# Patient Record
Sex: MALE | Race: WHITE | NOT HISPANIC OR LATINO | Employment: OTHER | URBAN - METROPOLITAN AREA
[De-identification: names, ages, dates, MRNs, and addresses within clinical notes are randomized per-mention and may not be internally consistent; named-entity substitution may affect disease eponyms.]

---

## 2017-01-03 ENCOUNTER — INITIAL CONSULT (OUTPATIENT)
Dept: NEUROSURGERY | Facility: CLINIC | Age: 42
End: 2017-01-03
Payer: COMMERCIAL

## 2017-01-03 VITALS
DIASTOLIC BLOOD PRESSURE: 82 MMHG | SYSTOLIC BLOOD PRESSURE: 149 MMHG | BODY MASS INDEX: 42.66 KG/M2 | WEIGHT: 315 LBS | HEIGHT: 72 IN | HEART RATE: 72 BPM

## 2017-01-03 DIAGNOSIS — M51.26 LUMBAR DISC HERNIATION: ICD-10-CM

## 2017-01-03 DIAGNOSIS — M54.50 LUMBAR SPINE PAIN: Primary | ICD-10-CM

## 2017-01-03 PROCEDURE — 99244 OFF/OP CNSLTJ NEW/EST MOD 40: CPT | Mod: S$GLB,,, | Performed by: PHYSICIAN ASSISTANT

## 2017-01-03 PROCEDURE — 99999 PR PBB SHADOW E&M-EST. PATIENT-LVL III: CPT | Mod: PBBFAC,,, | Performed by: PHYSICIAN ASSISTANT

## 2017-01-03 RX ORDER — METHYLPREDNISOLONE 4 MG/1
TABLET ORAL
Qty: 1 PACKAGE | Refills: 0 | Status: SHIPPED | OUTPATIENT
Start: 2017-01-03 | End: 2018-08-20

## 2017-01-03 NOTE — PROGRESS NOTES
[place] - Neurosurgery  Clinic Consult     Consult Requested By: Dave Hansen MD    Reason for Consult: herniated disc       SUBJECTIVE:     Chief Complaint: right lower extremity pain     History of Present Illness:  Sarkis Saez Jr. is a 41 y.o. male who presents for evaluation of L4-5 herniated disc. Patient states he experienced acute onset of low back pain after moving furniture two weeks ago. He states the pain was located in his low back and radiated into his right buttock and down the lateral aspect of his right leg. He was evaluated by his PCP and given a medrol dosepack. He states his pain significantly improved after the steroid pack. Currently, the patient is not experiencing any pain. He states he occasionally experiences right buttock and right lateral calf pain. This pain is sometimes elicited by walking, but mostly occurs at night. This pain is associated with tingling. Denies low back pain. Denies lower extremity weakness. Denies symptoms of bowel/bladder dysfunction. Patient was also prescribed Norco 5mg by his PCP, but has not needed to take any. He has been taking Aleve which provides relief. Patient is interested in conservative management at this time.     Review of patient's allergies indicates:  No Known Allergies    Past Medical History   Diagnosis Date    Anxiety     Hypertension     Obesity      No past surgical history on file.  Family History   Problem Relation Age of Onset    Diabetes Father     Hypertension Father     Heart disease Maternal Grandfather     Heart disease Paternal Grandfather      Social History   Substance Use Topics    Smoking status: Light Tobacco Smoker     Types: Cigars    Smokeless tobacco: Never Used    Alcohol use Yes        Review of Systems:  Constitutional: no fever, chills or night sweats. No changes in weight   Eyes: no visual changes   ENT: no nasal congestion or sore throat   Respiratory: no cough or shortness of breath   Cardiovascular:  no chest pain or palpitations   Gastrointestinal: no nausea or vomiting   Genitourinary: no hematuria or dysuria   Integument/Breast: no rash or pruritis   Hematologic/Lymphatic: no easy bruising or lymphadenopathy   Musculoskeletal: Negative for low back pain. Positive for right buttock and calf pain   Neurological: no seizures or tremors   Behavioral/Psych: Positive for anxiety and panic attacks   Endocrine: no heat or cold intolerance   All other systems reviewed and are negative.      OBJECTIVE:     Vital Signs (Most Recent):  Pulse: 72 (01/03/17 1341)  BP: (!) 149/82 (01/03/17 1341)    Physical Exam:  General: well developed, well nourished, no distress.   Neurologic: Alert and oriented. Thought content appropriate.  GCS: Motor: 6/Verbal: 5/Eyes: 4 GCS Total: 15  Mental Status: Awake, Alert, Oriented x 4  Language: No aphasia  Speech: No dysarthria  Cranial nerves: face symmetric, tongue midline, CN II-XII grossly intact.   Head: normocephalic, atraumatic  Eyes: pupils equal, round, reactive to light with accomodation, EOMI.  Neck: trachea midline, no JVD   Cardiovascular: no LE edema. Pulses 2+ and symmetric radial and dorsalis pedis.  Pulmonary: normal respirations, no signs of respiratory distress  Abdomen: soft, non-distended  Sensory: intact to light touch throughout  Skin: Skin is warm, dry and intact.    Motor Strength: Moves all extremities spontaneously with good tone. No abnormal movements seen.     Strength  Deltoids Triceps Biceps Wrist Extension Wrist Flexion Hand  Interossei   Upper: R 5/5 5/5 5/5 5/5 5/5 5/5 5/5    L 5/5 5/5 5/5 5/5 5/5 5/5 5/5     Iliopsoas Quadriceps Knee  Flexion Tibialis  anterior Gastro- cnemius EHL    Lower: R 5/5 5/5 5/5 5/5 5/5 5/5     L 5/5 5/5 5/5 5/5 5/5 5/5       Left  Right    Biceps DTR 2+ 2+   Brachioradialis DTR 2+ 2+   Patellar DTR 3+ 3+   Achilles DTR 3+ 3+   Kirby Absent  Absent   Clonus Absent Absent   Babinski Absent Absent     Gait: normal    Tandem  Gait: No difficulty           Able to walk on heels & toes    Lumbar Spine   ROM: full     Pain on Hip ROM: Negative  Straight leg raise elicits buttock pain on the right       Diagnostic Results:  I have independently reviewed the following imaging:  Xray Lumbar Spine AP and Lateral: mild degeneration lower lumbar region worst at L4-5     MRI Lumbar Spine: degeneration of the lumbar spine. L4-5 disc extrusion resulting in severe canal stenosis     ASSESSMENT/PLAN:     42 yo male with L4-5 extruded disc without neuro deficits or significant radicular pain.     -Physical therapy   -Medrol dosepack prescribed if pain returns  -Consider right transforaminal ZENIA if pain returns  -Follow up in 6 weeks  -Patient encouraged to call the clinic if pain returns or if he has any questions or concerns prior to his next appointment.     Discussed with Dr. Maximilian Merchant PA-C  Neurosurgery

## 2017-01-03 NOTE — LETTER
January 3, 2017      Dave Hansen MD  [address]  [facility] [address]           [place] - Neurosurgery  [address], [address]  [facility] [address]  Phone: [phone]          Patient: Sarkis Saez Jr.   MR Number: [id number]   YOB: 1975   Date of Visit: 1/3/2017       Dear Dr. Dave Hansen:    Thank you for referring Sarkis Saez to me for evaluation. Attached you will find relevant portions of my assessment and plan of care.    If you have questions, please do not hesitate to call me. I look forward to following Sarkis Saez along with you.    Sincerely,    Suze Merchant PA-C    Enclosure  CC:  No Recipients    If you would like to receive this communication electronically, please contact [email or web address] or [phone] to request more information on [employer] access.    For providers and/or their staff who would like to refer a patient to [facility], please contact us through our one-stop-shop provider referral line, [facility], at [phone].    If you feel you have received this communication in error or would no longer like to receive these types of communications, please e-mail [email or web address]

## 2017-01-06 ENCOUNTER — CLINICAL SUPPORT (OUTPATIENT)
Dept: REHABILITATION | Facility: HOSPITAL | Age: 42
End: 2017-01-06
Attending: NEUROLOGICAL SURGERY
Payer: COMMERCIAL

## 2017-01-06 DIAGNOSIS — M51.26 HERNIATION OF INTERVERTEBRAL DISC OF LUMBAR SPINE: Primary | ICD-10-CM

## 2017-01-06 PROCEDURE — G8979 MOBILITY GOAL STATUS: HCPCS | Mod: CJ,PO

## 2017-01-06 PROCEDURE — 97162 PT EVAL MOD COMPLEX 30 MIN: CPT | Mod: PO

## 2017-01-06 PROCEDURE — G8978 MOBILITY CURRENT STATUS: HCPCS | Mod: CJ,PO

## 2017-01-06 PROCEDURE — 97112 NEUROMUSCULAR REEDUCATION: CPT | Mod: PO

## 2017-01-06 NOTE — PROGRESS NOTES
Name: Sarkis Saez Jr.  Clinic Number: [id number]  01/06/2017.     Diagnosis: lumbar disc herniation   Physician: Brendon Yao MD  Treatment Orders: PT Eval and Treat    Past Medical History   Diagnosis Date    Anxiety     Hypertension     Obesity      Current Outpatient Prescriptions   Medication Sig    aspirin (ECOTRIN) 81 MG EC tablet Take 81 mg by mouth once daily.    atenolol (TENORMIN) 50 MG tablet Take 1 tablet (50 mg total) by mouth once daily.    buPROPion (WELLBUTRIN XL) 150 MG TB24 tablet Take 150 mg by mouth once daily.     clonazepam (KLONOPIN) 0.5 MG tablet Take 0.5 mg by mouth once daily.     fluoxetine (PROZAC) 40 MG capsule Take 80 mg by mouth once daily.     methylPREDNISolone (MEDROL DOSEPACK) 4 mg tablet use as directed    naproxen sodium (ANAPROX) 220 MG tablet Take 220 mg by mouth every 12 (twelve) hours.     No current facility-administered medications for this visit.      Review of patient's allergies indicates:  No Known Allergies  Precautions: standard      Subjective:    Previous PT: no  Work history: ; spends most of time sitting down   Recreational activities/exercise program: playing with kids     Sarkis is a 41 y.o. male referred to PT with lumbar disc herniation. sxs started week before Mila after lifting furniture. sxs are on the R side. Had similar pain before in May/June after sitting down. That episode resolved itself in a few weeks. Had to sit down, and had trouble getting back up again. Had x-ray and MRI, started steroid pack, and has been using Alleve for pain. Currently has no lower back pian unless he exerts his hips. sxs will be for a little while then go away. sxs are on his posterior buttock and exerior calf. sxs feel like someone has been scratching skin and it is sensitive to the touch.  Biggest pain is when leg is extended and when he gets up. sxs are worse at night after he starts doing activities. When he lays in bed with his  leg extended sxs will increase. Will typically sleep on his R side.  Initial MD concerned about unable to do heel walking, but PA at neurosurgery was no concerned because he has full strength.     Pain is rated on a 0-10 scale with 0 being no pain and 10 being pain requiring emergency room visit.    Diagnostic Tests: Radiographs and MRI    Patient Goals for PT: reduce pain      Objective:    FOTO Back Survey 39% limitations  Current -LQ Goal -UV (26%)    Category: Mobility     G-Code Modifiers  CH  0% Impaired, limited, or restricted    CI  19% - 1%  Impaired, limited, or restricted    CJ  20% - 39% Impaired, limited, or restricted    CK  40% - 59% Impaired, limited, or restricted    CL  60% - 79% Impaired, limited, or restricted    CM  80% - 99% Impaired, limited, or restricted    CN  100% Impaired, limited, or restricted        Visit # 1  Time In: 0840  Time Out: 0940  Treatment time: 60  Date of eval/re-eval: 1/6/2017    DTR R/L: Biceps C5/6 NL / NL Patella L4 NL / NL Achilles S2 NL / NL DF NL / NL   NL 1+, 2+, 3+; ABS 0; HYP 4+; DORIS 5+    Light touch R/L: Ant thigh L2 NL / NL Med knee L3 NL / NL Med malleolus L4 NL / NL Med heel L5 NL / NL Lat foot S1 NL / NL Med foot S2 NL / NL    NL intact; DIM diminished     Alignment/appearance:   Thoracic spine: flat --> nl --> inc   Lumbar spine: flat --> nl --> inc  Pelvic tilt: post --> nl --> ant    Pelvic rotation: L / R post / sup nl     Strength R/L WFL throughout LE  except    Heel walk unable / wfl    AROM tests R/L    Multi-segmental flexion -    Return from flexion -  Multi-segmental extension -    Multi-segmental rotation - / -    Trunk side-bend - / -   Single leg balance nt / nt   Deep squat nt  SL squat nt / nt        Findings R/L    Slump test + / +   SLR test nt / nt   L/S PA pressures -   Multifidus lift test nt       PT Evaluation Complete? YES  Discussed Plan of Care with patient: YES     [1] Neuromuscular re-ed x 15'   TA activation with  breathing 10x   n glides in supine 3x 5 ankle pumps R/L   Prone press ups 5x not to full ROM     Written HEP Provided: no  Patient education: HEP; TA activation, n glides, press ups, decrease flexion with ADLs  Sarkis demo good understanding of the education provided. Patient demo good return demo of skill of exercises.    Problem List: muscle length impairments, decreased motor control and mobility, impaired ADLs, activity and participation restrictions.       CPT Code reference        Hx  Exam  Presentation   Code     Low     0   1-2    Stable    62031     Mod     1-2   3    Evolving    80903     High     3+   4+     Unstable    52963       Assessment:    Physical therapy diagnosis: lumbar flexion syndrome  Rehab potential: good    Sarkis presents with the above listed impairments. Most of visit was spent educating patient on dx, performed exercises and education was to decrease the amount of spinal flexion with ADLs.    Sarkis will benefit from skilled PT services to address these impairments and progress towards the below listed functional goals.  Sarkis is in agreement with the goals that will be addressed in the treatment plan.Sarkis demonstrates no additional cultural, spiritual or educational needs and currently has no barriers to learning.      Medical necessity: see problem list -  indicates  / mod /  complexity based on clinical presentation that is  / evolving due to acute nature of herniation possibly of sxs provocation when he returns to work.       Functional Goals  Time frame     1.   The pt will demonstrate (-) slump test BLE for improved tolerance to ADLs.   4-6 weeks     2.   The pt will report > 10% improvement on FOTO outcome survery indicating improved function.   6 weeks     3.          4.   The pt will be independent in performance and progression of HEP.     2-3 visits            Plan:      Proceed/Continue with POC.     Pt will be treated by physical therapy 1-2x/week during the  certification period. Treatment will consist of therapeutic exercise, manual therapy, neuromuscular re-education, heat and cold modalities, electrical stimulation for pain relief and/or muscle activation, and any other types of treatment hat may be deemed medically necessary. Sarkis may at times be seen by a PTA as part of the Rehab Team.       Physical Therapist: DENNISE Christian, PT, DPT, CSCS    I certify the need for these services furnished under this plan of treatment and while under my care.       ___________________________________  Physician/Referring Practitioner        _________________  Date of Signature

## 2017-01-12 ENCOUNTER — CLINICAL SUPPORT (OUTPATIENT)
Dept: REHABILITATION | Facility: HOSPITAL | Age: 42
End: 2017-01-12
Attending: NEUROLOGICAL SURGERY
Payer: COMMERCIAL

## 2017-01-12 DIAGNOSIS — M51.26 HERNIATION OF INTERVERTEBRAL DISC OF LUMBAR SPINE: Primary | ICD-10-CM

## 2017-01-12 PROCEDURE — 97112 NEUROMUSCULAR REEDUCATION: CPT | Mod: PO

## 2017-01-12 NOTE — PROGRESS NOTES
Name: Sarkis Saez Jr.  Clinic Number: [id number]  01/12/2017.     Diagnosis: lumbar disc herniation   Physician: Brendon Yao MD  Treatment Orders: PT Eval and Treat    Past Medical History   Diagnosis Date    Anxiety     Hypertension     Obesity      Current Outpatient Prescriptions   Medication Sig    aspirin (ECOTRIN) 81 MG EC tablet Take 81 mg by mouth once daily.    atenolol (TENORMIN) 50 MG tablet Take 1 tablet (50 mg total) by mouth once daily.    buPROPion (WELLBUTRIN XL) 150 MG TB24 tablet Take 150 mg by mouth once daily.     clonazepam (KLONOPIN) 0.5 MG tablet Take 0.5 mg by mouth once daily.     fluoxetine (PROZAC) 40 MG capsule Take 80 mg by mouth once daily.     methylPREDNISolone (MEDROL DOSEPACK) 4 mg tablet use as directed    naproxen sodium (ANAPROX) 220 MG tablet Take 220 mg by mouth every 12 (twelve) hours.     No current facility-administered medications for this visit.      Review of patient's allergies indicates:  No Known Allergies  Precautions: standard      Subjective:    Previous PT: no  Work history: ; spends most of time sitting down   Recreational activities/exercise program: playing with kids     Sarkis doing better. Only feels sxs when he wakes up in the morning and when he goes to bed in the evening. Will feel sxs on lateral calf and in foot when he is making specific movements, like getting into a car, but that only lasts while the activity is going on. Still continues to have skin sensitivity on the R toe.      Pain is rated on a 0-10 scale with 0 being no pain and 10 being pain requiring emergency room visit.    Diagnostic Tests: Radiographs and MRI    Patient Goals for PT: reduce pain      Objective:    Visit # 2  Time In: 0830  Time Out: 0900  Treatment time: 30  Date of eval/re-eval: 1/6/2017       PT Evaluation Complete? YES  Discussed Plan of Care with patient: YES     [2] Neuromuscular re-ed x 30'  TA activation with breathing 15x   Seated  n glides DF to knee ext to PF then return 10x RLU  n glides in supine 3x 5 ankle pumps RLE with c/s extension to reduce tension to low back  Supine l/s rotation 3 x 5x breath cycles per side  Prone press ups 10x to full ROM     Written HEP Provided: no  Patient education: HEP; TA activation, n glides, press ups, decrease flexion with ADLs  Sarkis demo good understanding of the education provided. Patient demo good return demo of skill of exercises.    Problem List: muscle length impairments, decreased motor control and mobility, impaired ADLs, activity and participation restrictions.       CPT Code reference        Hx  Exam  Presentation   Code     Low     0   1-2    Stable    33782     Mod     1-2   3    Evolving    88644     High     3+   4+     Unstable    15293       Assessment:    Physical therapy diagnosis: lumbar flexion syndrome  Rehab potential: good    Sarkis tolerated tx without increase in sxs. Movement has improved. Still some irritation with sliders, used c/s extension to reduce sxs in LE. Will work tension next visit.  Sarkis will benefit from skilled PT services to address these impairments and progress towards the below listed functional goals.  Sarkis is in agreement with the goals that will be addressed in the treatment plan.Sarkis demonstrates no additional cultural, spiritual or educational needs and currently has no barriers to learning.      Medical necessity: see problem list -  indicates  / mod /  complexity based on clinical presentation that is  / evolving due to acute nature of herniation possibly of sxs provocation when he returns to work.       Functional Goals  Time frame     1.   The pt will demonstrate (-) slump test BLE for improved tolerance to ADLs.   4-6 weeks     2.   The pt will report > 10% improvement on FOTO outcome survery indicating improved function.   6 weeks     3.          4.   The pt will be independent in performance and progression of HEP.     2-3 visits             Plan:      Proceed/Continue with POC.     Pt will be treated by physical therapy 1-2x/week during the certification period. Treatment will consist of therapeutic exercise, manual therapy, neuromuscular re-education, heat and cold modalities, electrical stimulation for pain relief and/or muscle activation, and any other types of treatment hat may be deemed medically necessary. Sarkis may at times be seen by a PTA as part of the Rehab Team.       Physical Therapist: DENNISE Christian, PT, DPT, CSCS    I certify the need for these services furnished under this plan of treatment and while under my care.       ___________________________________  Physician/Referring Practitioner        _________________  Date of Signature

## 2017-02-06 RX ORDER — ATENOLOL 50 MG/1
TABLET ORAL
Qty: 90 TABLET | Refills: 0 | Status: SHIPPED | OUTPATIENT
Start: 2017-02-06 | End: 2017-05-04 | Stop reason: SDUPTHER

## 2017-02-13 ENCOUNTER — OFFICE VISIT (OUTPATIENT)
Dept: NEUROSURGERY | Facility: CLINIC | Age: 42
End: 2017-02-13
Payer: COMMERCIAL

## 2017-02-13 VITALS
SYSTOLIC BLOOD PRESSURE: 123 MMHG | BODY MASS INDEX: 42.66 KG/M2 | WEIGHT: 315 LBS | HEART RATE: 63 BPM | DIASTOLIC BLOOD PRESSURE: 71 MMHG | HEIGHT: 72 IN

## 2017-02-13 DIAGNOSIS — M51.26 LUMBAR DISC HERNIATION: Primary | ICD-10-CM

## 2017-02-13 PROCEDURE — 3074F SYST BP LT 130 MM HG: CPT | Mod: S$GLB,,, | Performed by: PHYSICIAN ASSISTANT

## 2017-02-13 PROCEDURE — 99212 OFFICE O/P EST SF 10 MIN: CPT | Mod: S$GLB,,, | Performed by: PHYSICIAN ASSISTANT

## 2017-02-13 PROCEDURE — 3078F DIAST BP <80 MM HG: CPT | Mod: S$GLB,,, | Performed by: PHYSICIAN ASSISTANT

## 2017-02-13 PROCEDURE — 99999 PR PBB SHADOW E&M-EST. PATIENT-LVL III: CPT | Mod: PBBFAC,,, | Performed by: PHYSICIAN ASSISTANT

## 2017-02-13 NOTE — PROGRESS NOTES
[place] - Neurosurgery  Progress Note      SUBJECTIVE:     Chief Complaint/Reason for Visit: follow up after PT     History of Present Illness:  Sarkis Saez Jr. is a 42 y.o. male who presents for follow up after lumbar PT. At his last visit, patient presented for evaluation of L4-5 herniated disc and low back pain which began after moving furniture. The pain radiated into his right buttock and down the lateral aspect of his right leg. His pain significantly improved after a medrol dosepack and was not experiencing any pain at his last appointment. Patient was referred to physical therapy and returns today for a 6 week follow up. He completed two sessions with physical therapy and was told he did not need to return. Patient states he is no longer experiencing right lateral calf pain. He still experiences occasional right buttock pain when he sits in one position for a prolonged period of time. He states this pain resolves after walking a short distance. He also continues to experience tingling in his right great toe at night occasionally. Denies low back pain. Denies lower extremity weakness.       Review of patient's allergies indicates:  No Known Allergies    Past Medical History   Diagnosis Date    Anxiety     Hypertension     Obesity      No past surgical history on file.  Family History   Problem Relation Age of Onset    Diabetes Father     Hypertension Father     Heart disease Maternal Grandfather     Heart disease Paternal Grandfather      Social History   Substance Use Topics    Smoking status: Light Tobacco Smoker     Types: Cigars    Smokeless tobacco: Never Used    Alcohol use Yes        Review of Systems:  Constitutional: no fever, chills or night sweats. No changes in weight   Eyes: no visual changes   ENT: no nasal congestion or sore throat   Respiratory: no cough or shortness of breath   Cardiovascular: no chest pain or palpitations   Gastrointestinal: no nausea or vomiting    Genitourinary: no hematuria or dysuria   Integument/Breast: no rash or pruritis   Hematologic/Lymphatic: no easy bruising or lymphadenopathy   Musculoskeletal: no arthralgias or myalgias   Neurological: no seizures or tremors   Behavioral/Psych: Positive for anxiety and panic attacks   Endocrine: no heat or cold intolerance       OBJECTIVE:     Vital Signs (Most Recent):      Vitals:    02/13/17 1316   BP: 123/71   Pulse: 63     Physical Exam:  General: well developed, well nourished, no distress.   Neurologic: Alert and oriented. Thought content appropriate.  Head: normocephalic, atraumatic  Eyes:  EOMI.  Neck: trachea midline, no JVD   Cardiovascular: no LE edema  Pulmonary: normal respirations, no signs of respiratory distress  Abdomen: soft, non-distended  Sensory: intact to light touch throughout  Pulses: 2+ and symmetric radial and dorsalis pedis.  Skin: Skin is warm, dry and intact.  Motor Strength: Moves all extremities spontaneously with good tone. No abnormal movements seen.     Iliopsoas Quadriceps Knee  Flexion Tibialis  anterior Gastro- cnemius EHL   Lower: R 5/5 5/5 5/5 5/5 5/5 5/5    L 5/5 5/5 5/5 5/5 5/5 5/5     Diagnostic Results:  I have independently reviewed the following imaging:  N/A    ASSESSMENT/PLAN:     43 yo male with L4-5 herniated disc without neuro deficits or radicular pain    -Discussed safe exercises  -Follow up as needed  -Patient has medrol dosepack if pain returns and has been instructed to call the office if the pain returns  -Will consider referral to pain for right transforaminal ZENIA if pain returns   -Patient encouraged to call the clinic with any new or worsening symptoms or if he has any questions or concerns     Suze Merchant PA-C  Neurosurgery

## 2017-05-04 RX ORDER — ATENOLOL 50 MG/1
TABLET ORAL
Qty: 90 TABLET | Refills: 0 | Status: SHIPPED | OUTPATIENT
Start: 2017-05-04 | End: 2017-11-02 | Stop reason: SDUPTHER

## 2017-07-31 RX ORDER — ATENOLOL 50 MG/1
TABLET ORAL
Qty: 90 TABLET | Refills: 0 | Status: SHIPPED | OUTPATIENT
Start: 2017-07-31 | End: 2017-11-02 | Stop reason: SDUPTHER

## 2017-11-02 RX ORDER — ATENOLOL 50 MG/1
TABLET ORAL
Qty: 90 TABLET | Refills: 0 | Status: SHIPPED | OUTPATIENT
Start: 2017-11-02 | End: 2018-01-15 | Stop reason: SDUPTHER

## 2018-01-15 RX ORDER — ATENOLOL 50 MG/1
50 TABLET ORAL DAILY
Qty: 90 TABLET | Refills: 1 | Status: SHIPPED | OUTPATIENT
Start: 2018-01-15 | End: 2018-07-24 | Stop reason: SDUPTHER

## 2018-07-24 DIAGNOSIS — Z00.00 ANNUAL PHYSICAL EXAM: Primary | ICD-10-CM

## 2018-07-24 DIAGNOSIS — I10 HYPERTENSION, ESSENTIAL: ICD-10-CM

## 2018-07-24 RX ORDER — ATENOLOL 50 MG/1
TABLET ORAL
Qty: 30 TABLET | Refills: 0 | Status: SHIPPED | OUTPATIENT
Start: 2018-07-24 | End: 2018-08-20

## 2018-08-13 ENCOUNTER — LAB VISIT (OUTPATIENT)
Dept: LAB | Facility: HOSPITAL | Age: 43
End: 2018-08-13
Attending: FAMILY MEDICINE
Payer: COMMERCIAL

## 2018-08-13 DIAGNOSIS — Z00.00 ANNUAL PHYSICAL EXAM: ICD-10-CM

## 2018-08-13 LAB
ANION GAP SERPL CALC-SCNC: 8 MMOL/L
BUN SERPL-MCNC: 11 MG/DL
CALCIUM SERPL-MCNC: 9.6 MG/DL
CHLORIDE SERPL-SCNC: 105 MMOL/L
CHOLEST SERPL-MCNC: 188 MG/DL
CHOLEST/HDLC SERPL: 5.4 {RATIO}
CO2 SERPL-SCNC: 27 MMOL/L
CREAT SERPL-MCNC: 1 MG/DL
EST. GFR  (AFRICAN AMERICAN): >60 ML/MIN/1.73 M^2
EST. GFR  (NON AFRICAN AMERICAN): >60 ML/MIN/1.73 M^2
GLUCOSE SERPL-MCNC: 113 MG/DL
HDLC SERPL-MCNC: 35 MG/DL
HDLC SERPL: 18.6 %
LDLC SERPL CALC-MCNC: 117.2 MG/DL
NONHDLC SERPL-MCNC: 153 MG/DL
POTASSIUM SERPL-SCNC: 4.5 MMOL/L
SODIUM SERPL-SCNC: 140 MMOL/L
TRIGL SERPL-MCNC: 179 MG/DL

## 2018-08-13 PROCEDURE — 80048 BASIC METABOLIC PNL TOTAL CA: CPT

## 2018-08-13 PROCEDURE — 80061 LIPID PANEL: CPT

## 2018-08-13 PROCEDURE — 36415 COLL VENOUS BLD VENIPUNCTURE: CPT

## 2018-08-20 ENCOUNTER — PATIENT MESSAGE (OUTPATIENT)
Dept: ADMINISTRATIVE | Facility: OTHER | Age: 43
End: 2018-08-20

## 2018-08-20 ENCOUNTER — OFFICE VISIT (OUTPATIENT)
Dept: INTERNAL MEDICINE | Facility: CLINIC | Age: 43
End: 2018-08-20
Attending: FAMILY MEDICINE
Payer: COMMERCIAL

## 2018-08-20 VITALS
HEART RATE: 75 BPM | WEIGHT: 315 LBS | DIASTOLIC BLOOD PRESSURE: 76 MMHG | OXYGEN SATURATION: 98 % | SYSTOLIC BLOOD PRESSURE: 136 MMHG | BODY MASS INDEX: 42.66 KG/M2 | HEIGHT: 72 IN

## 2018-08-20 DIAGNOSIS — I10 HYPERTENSION, ESSENTIAL: ICD-10-CM

## 2018-08-20 DIAGNOSIS — Z00.00 ANNUAL PHYSICAL EXAM: Primary | ICD-10-CM

## 2018-08-20 PROCEDURE — 3078F DIAST BP <80 MM HG: CPT | Mod: CPTII,S$GLB,, | Performed by: FAMILY MEDICINE

## 2018-08-20 PROCEDURE — 99396 PREV VISIT EST AGE 40-64: CPT | Mod: S$GLB,,, | Performed by: FAMILY MEDICINE

## 2018-08-20 PROCEDURE — 3075F SYST BP GE 130 - 139MM HG: CPT | Mod: CPTII,S$GLB,, | Performed by: FAMILY MEDICINE

## 2018-08-20 PROCEDURE — 99999 PR PBB SHADOW E&M-EST. PATIENT-LVL IV: CPT | Mod: PBBFAC,,, | Performed by: FAMILY MEDICINE

## 2018-08-20 RX ORDER — DULOXETIN HYDROCHLORIDE 60 MG/1
90 CAPSULE, DELAYED RELEASE ORAL DAILY
Refills: 1 | COMMUNITY
Start: 2018-07-20

## 2018-08-20 RX ORDER — AMLODIPINE BESYLATE 5 MG/1
5 TABLET ORAL DAILY
Qty: 90 TABLET | Refills: 1 | Status: SHIPPED | OUTPATIENT
Start: 2018-08-20 | End: 2018-08-27

## 2018-08-20 NOTE — PROGRESS NOTES
Subjective:       Patient ID: Sarkis Saez Jr. is a 43 y.o. male.    Chief Complaint: Follow-up    Established patient for an annual wellness check/physical exam and also chronic disease management. Specific complaints - see dictation and please see ROS.  P, S, Fm, Soc Hx's; Meds, allergies reviewed and reconciled.  Health maintenance file reviewed and addressed items due.        Review of Systems   Constitutional: Negative for activity change, chills, fatigue, fever and unexpected weight change.   HENT: Negative for congestion, hearing loss, rhinorrhea and trouble swallowing.    Eyes: Negative for discharge, redness and visual disturbance.   Respiratory: Negative for cough, chest tightness, shortness of breath and wheezing.    Cardiovascular: Negative for chest pain, palpitations and leg swelling.   Gastrointestinal: Negative for abdominal pain, blood in stool, constipation, diarrhea and vomiting.   Endocrine: Negative for polydipsia and polyuria.   Genitourinary: Negative for difficulty urinating, hematuria and urgency.   Musculoskeletal: Negative for arthralgias, back pain, gait problem, joint swelling, myalgias and neck pain.   Skin: Negative for color change and rash.   Neurological: Negative for tremors, speech difficulty, weakness, numbness and headaches.   Hematological: Negative for adenopathy. Does not bruise/bleed easily.   Psychiatric/Behavioral: Negative for behavioral problems, confusion, dysphoric mood and sleep disturbance. The patient is not nervous/anxious.        Objective:      Physical Exam   Constitutional: He is oriented to person, place, and time. He appears well-developed and well-nourished. No distress.   HENT:   Head: Normocephalic.   Right Ear: Tympanic membrane, external ear and ear canal normal.   Left Ear: Tympanic membrane, external ear and ear canal normal.   Nose: Nose normal.   Mouth/Throat: Uvula is midline, oropharynx is clear and moist and mucous membranes are normal. No  oropharyngeal exudate.   Eyes: Conjunctivae are normal. Right eye exhibits no discharge. Left eye exhibits no discharge. No scleral icterus.   Neck: Normal range of motion. Neck supple. No thyromegaly present.   Cardiovascular: Normal rate, regular rhythm, normal heart sounds and intact distal pulses. Exam reveals no gallop and no friction rub.   No murmur heard.  Pulmonary/Chest: Effort normal. No respiratory distress. He has no wheezes. He has no rales. He exhibits no tenderness.   Abdominal: Soft. There is no tenderness.   Musculoskeletal: He exhibits no edema.   Lymphadenopathy:     He has no cervical adenopathy.   Neurological: He is alert and oriented to person, place, and time.   Skin: Skin is warm and dry. No rash noted. He is not diaphoretic.   Nursing note and vitals reviewed.      Assessment:       1. Annual physical exam    2. Hypertension, essential        Plan:   Sarkis was seen today for follow-up.    Diagnoses and all orders for this visit:    Annual physical exam    Hypertension, essential  -     [place]  -     [place] [place] Assign Onboarding Questionnaires    Other orders  -     amLODIPine (NORVASC) 5 MG tablet; Take 1 tablet (5 mg total) by mouth once daily.      See meds, orders, follow up, routing and instructions sections of encounter.  HISTORY O PRESENT ILLNESS:  A 43-year-old established male patient for annual   physical examination, no complaints.  Cohort calculation is 3.7% today.  Diet   and exercise discussed.  The patient has some cold and upper respiratory   complaints at this time with a cough predominantly upper respiratory.    RECOMMENDATIONS:  OTC Claritin or Allegra.  Avoid decongestant.  Low-sodium   and/or Mediterranean type diet.  Follow up p.quynh STRICKLAND/CARMEN  dd: 08/20/2018 15:35:20 (CDT)  td: 08/21/2018 05:55:16 (CDT)  Doc ID   #[id number]  Job ID #[id number]    CC:

## 2018-08-22 ENCOUNTER — PATIENT OUTREACH (OUTPATIENT)
Dept: OTHER | Facility: OTHER | Age: 43
End: 2018-08-22

## 2018-08-22 NOTE — LETTER
Amanda Glass, Soule[place]ane  [address] [place] [address]     Dear Sarkis Saez Jr.,    Welcome to the [facility] Digital [facility]!           My name is Amanda Glass PharmD and I am your dedicated Digital Medicine clinician.  As an expert in medication management, I will help ensure that the medications you are taking continue to provide you with the intended benefits.        I am Robin Gan and I will be your health  for the duration of the program.  My  job is to help you identify lifestyle changes to improve your blood pressure control.  We will talk about nutrition, exercise, and other ways that you may be able to adjust your current habits to better your health. Together, we will work to improve your overall health and encourage you to meet your goals for a healthier lifestyle.    What we expect from YOU:    You will need to take blood pressure readings multiple times a week and no less than one reading per week.   It is important that you take your measurements at different times during the day, when possible.     What you should expect from your Digital Medicine Care Team:   We will provide you with education about high blood pressure, including lifestyle changes that could help you to control your blood pressure.   We will review your weekly readings and provide you with monthly blood pressure progress reports after you have been in the program for more than 30 days.   We will send monthly progress reports on your blood pressure control to your physician so they can follow along with your progress as well.    You will be able to reach me by phone at  or through your [facility] account by clicking my name under Care Team on the right side of the home screen.    I look forward to working with you to achieve your blood pressure goals!    Sincerely,  Amanda Glass PharmD  Your personal clinician    Please visit [email or web address] to  learn more about high blood pressure and what you can do lower your blood pressure.                                                                                           Sarkis Saez Jr.  [address]  [place] [address]

## 2018-08-22 NOTE — PROGRESS NOTES
Last 5 Patient Entered Readings                                      Current 30 Day Average: 151/90     Recent Readings 8/21/2018 8/20/2018 8/20/2018 8/20/2018 8/20/2018    SBP (mmHg) 146 156 158 166 157    DBP (mmHg) 86 94 94 95 89    Pulse 66 72 76 74 72        Digital Medicine: Health  Introduction Call     8/22: Left voicemail and requested call back in order to complete digital medicine health  introduction call.

## 2018-08-26 ENCOUNTER — PATIENT MESSAGE (OUTPATIENT)
Dept: INTERNAL MEDICINE | Facility: CLINIC | Age: 43
End: 2018-08-26

## 2018-08-27 RX ORDER — AMLODIPINE BESYLATE 10 MG/1
10 TABLET ORAL DAILY
Qty: 90 TABLET | Refills: 0 | Status: SHIPPED | OUTPATIENT
Start: 2018-08-27 | End: 2018-09-05 | Stop reason: SDUPTHER

## 2018-08-27 NOTE — PROGRESS NOTES
Last 5 Patient Entered Readings                                      Current 30 Day Average: 153/89     Recent Readings 8/27/2018 8/27/2018 8/27/2018 8/26/2018 8/26/2018    SBP (mmHg) 154 142 157 151 154    DBP (mmHg) 85 95 88 92 92    Pulse 69 70 76 75 73        Digital Medicine: Health  Introduction Call     8/27: Pt was busy, stated he would call back around 12:30pm. Will follow up.  Mr. Charles called back.      Digital Medicine: Health  Introduction    Mr. Sarkis Saez Jr. is a 43 y.o. male who is newly enrolled in the Digital Medicine Hypertension Program.     Patient prefers a 30 days supply.    HYPERTENSION:  Explained that we expect patient to obtain several blood pressures per week at random times of day.   Our goal is to get  BP to consistently below 130/80mmHg and make the process convenient so patient can avoid extra trips to the office. Getting your blood pressure below 130/80mmHg (definition of control) will reduce your risk for heart attack, kidney failure, stroke and death (as well as kidney failure, eye disease, & dementia).     Instructed patient not to allow anyone else to use phone and BP cuff.   Patient is not meeting the goal. Current BP average 153/89 mmHg.    Discussed appropriate BP measuring technique:  Before taking your blood pressure, find a quiet place. You will need to listen for your heartbeat.  Roll up the sleeve on your left arm or remove any tight-sleeved clothing, if needed. (It's best to take your blood pressure from your left arm if you are right-handed.You can use the other arm if you have been told by your health care provider to do so.)  Rest in a chair next to a table for 5 to 10 minutes. (Your left arm should rest comfortably at heart level.)  Sit up straight with your back against the chair, legs uncrossed and on the ground.  Rest your forearm on the table with the palm of your hand facing up.  You should not talk, read the newspaper, or watch television  "during this process.       Lifestyle Assessment:  Current Dietary Habits(i.e. low sodium, food labels, dining out): Mr. Saez reports eating "fresh proteins and veggies, and admits he will use canned pasta sauces and condiments".  I advised pt to read sodium labels and opt for the "lower/no sodium" options. Pt acknowledged.    Exercise: Pt states he "started walking x30min/day."   Alcohol/Tobacco: Pt admits to having "a drink maybe x1-2 a week."  Medication Adherence: has been compliant with the medicaiton regimen, "Pt started new meds 3 days ago, is worried about high readings. pt reports being very anxious."  Other goals: Will discuss next call.    Reviewed AHA/AACE recommendations:  Limit sodium intake to <2000mg/day. Pt acknowledged.       Reviewed the importance of self-monitoring, medication adherence, and that the health  can be used as a resource for lifestyle modifications to help reduce or maintain a healthy lifestyle.  Reviewed that the Digital Medicine team is not available for emergencies and instructed the patient to call 911 or [facility] On [facility] ([phone] or [phone]) if one arises.  Patient and I agreed that he will continue to monitor blood pressure and sodium intake and continue to remain adherent to medications.   I will plan to follow-up with the patient in 3 weeks.     Robin EM Digital Medicine Health   [phone]  "

## 2018-08-31 ENCOUNTER — PATIENT OUTREACH (OUTPATIENT)
Dept: OTHER | Facility: OTHER | Age: 43
End: 2018-08-31

## 2018-08-31 DIAGNOSIS — I10 HYPERTENSION, ESSENTIAL: Primary | ICD-10-CM

## 2018-08-31 NOTE — PROGRESS NOTES
Last 5 Patient Entered Readings                                      Current 30 Day Average: 150/89     Recent Readings 8/28/2018 8/28/2018 8/28/2018 8/28/2018 8/28/2018    SBP (mmHg) 141 141 148 146 156    DBP (mmHg) 86 91 91 92 96    Pulse 74 83 75 77 78          Called patient to welcome him to the Hypertension digital medicine program. Average BP is 150/89 mmHg. Goal <130/<80. For BP management, patient currently takes amlodipine 10 mg. Dose was increased from 5 mg to 10 mg on 8/26/18. Patient states that he as on atenolol for a number of years before and he was recently changed to amlodipine, and he is having no issues.     1) Continue current BP medications.   2) Discussed proper BP monitoring technique and BP goal  4) Will defer lifestyle counseling to digital medicine health .   5) Will follow up with patient in 2 weeks to assess BP and need for additional medication adjustments      Amanda Glass, Pharm.D.   Digital Medicine Clinical Pharmacist  [phone]

## 2018-09-05 RX ORDER — AMLODIPINE BESYLATE 10 MG/1
10 TABLET ORAL DAILY
Qty: 30 TABLET | Refills: 2 | Status: SHIPPED | OUTPATIENT
Start: 2018-09-05 | End: 2018-12-05 | Stop reason: SDUPTHER

## 2018-09-17 ENCOUNTER — PATIENT OUTREACH (OUTPATIENT)
Dept: OTHER | Facility: OTHER | Age: 43
End: 2018-09-17

## 2018-09-17 NOTE — LETTER
October 1, 2018     Sarkis Saez Jr.  [address]  [place] [address]       Dear Sarkis,    Thank you for enrolling in the [facility]. To participate in our programs, we ask that you submit weekly home readings through your [facility] account and maintain regular contact with your Care Team.  We have received some data, but we have not heard from you in some time.     The Digital Medicine Care Team has attempted to reach you on multiple occasions to determine if you would like to continue participating in the program. While we encourage you to continue participating fully, we understand that circumstances may change.      To continue participating in the program, please contact me at . If we do not hear back, you will be un-enrolled and your physician will be notified of your decision.    We look forward to hearing from you soon.    Sincerely,     Robin EM Digital Medicine Health   [phone]

## 2018-09-17 NOTE — PROGRESS NOTES
Last 5 Patient Entered Readings                                      Current 30 Day Average: 146/85     Recent Readings 9/16/2018 9/16/2018 9/16/2018 9/11/2018 9/11/2018    SBP (mmHg) 139 138 140 146 151    DBP (mmHg) 82 85 87 85 80    Pulse 66 68 68 71 69            Digital Medicine: Health  Follow Up    9/17: Left voicemail to follow up with Mr. Sarkis Saez Jr..  Current BP average 146/85 mmHg is not at goal, <130/80 mmHg.

## 2018-09-24 NOTE — PROGRESS NOTES
Last 5 Patient Entered Readings                                      Current 30 Day Average: 143/82     Recent Readings 9/22/2018 9/22/2018 9/22/2018 9/22/2018 9/17/2018    SBP (mmHg) 141 143 140 150 136    DBP (mmHg) 71 72 77 79 74    Pulse 65 66 68 67 65            Digital Medicine: Health  Follow Up    9/24: Left voicemail to follow up with Mr. Sarkis Saez Jr..  Current BP average 143/82 mmHg is not at goal, <130/80 mmHg.  Will send [employer] message.

## 2018-09-28 ENCOUNTER — PATIENT OUTREACH (OUTPATIENT)
Dept: OTHER | Facility: OTHER | Age: 43
End: 2018-09-28

## 2018-09-28 DIAGNOSIS — I10 HYPERTENSION, ESSENTIAL: Primary | ICD-10-CM

## 2018-09-28 NOTE — PROGRESS NOTES
Last 5 Patient Entered Readings                                      Current 30 Day Average: 142/79     Recent Readings 10/4/2018 10/4/2018 10/4/2018 10/4/2018 10/4/2018    SBP (mmHg) 147 142 147 153 155    DBP (mmHg) 83 77 76 87 84    Pulse 65 66 64 62 65          Called patient for BP follow up. Avg BP is 142/79 mmHg. Goal <130/<80. He takes amlodipine 10 mg QD for BP control. He reports no issues with the amlodipine. He states that he usually takes his BP a couple of times and the first time is usually higher due to anxiety.     1) Continue amlodipine. Start irbesartan 150 mg QD. Electrolytes and renal function WNL on 8/13/18  2) Patient knows to contact me with any non-urgent clinical changes or concerns. Go to ED or call [facility] on [facility] for emergencies.   3) Will defer lifestyle counseling to digital medicine health .   4) Will follow up with patient in 2 weeks to assess BP and need for additional medication adjustments        Amanda Glass, Pharm.D.   Digital Medicine Clinical Pharmacist  [phone]

## 2018-10-01 NOTE — PROGRESS NOTES
"Last 5 Patient Entered Readings                                      Current 30 Day Average: 141/80     Recent Readings 9/26/2018 9/26/2018 9/26/2018 9/26/2018 9/26/2018    SBP (mmHg) 147 141 158 151 143    DBP (mmHg) 82 82 76 86 88    Pulse 64 65 62 63 64          Digital Medicine: Health  Follow Up    10/1: Left voicemail to follow up with Mr. Dockery MARY Saez Jr..  Current BP average 141/80 mmHg is not at goal, <130/80 mmHg.  Patient called back.      Digital Medicine: Health  Follow Up    Lifestyle Modifications:    1.Dietary Modifications (Sodium intake <2,000mg/day, food labels, dining out):   Pt states is very "cognisant of what he is eating now."  He reports reading food labels and monitoring everything "he puts in his mouth".  I encouraged pt to keep up the great work with limiting the sodium. I also encouraged pt to start watching his portion sizes.  Will follow up on next outreach.     2.Physical Activity:   Pt states he has been travelling so he has not been active as he would like.  I advised pt to continue to stay as active as possible by walking on treadmills in the hotels he travels too. Pt verbally acknowledged.     3.Medication Therapy:   Patient has been compliant with the medication regimen.    4.Patient has the following medication side effects/concerns:  None  (Frequency/Alleviating factors/Precipitating factors, etc.)     Follow up with Mr. Dockery MARY Saez Jr. completed. No further questions or concerns. Will continue to follow up to achieve health goals.  Patient is trending downward but is currently not at goal, 141/80 mmHg exceeds <130/80 mmHg.  "

## 2018-10-05 RX ORDER — IRBESARTAN 150 MG/1
150 TABLET ORAL NIGHTLY
Qty: 30 TABLET | Refills: 2 | Status: SHIPPED | OUTPATIENT
Start: 2018-10-05 | End: 2019-01-24 | Stop reason: DRUGHIGH

## 2018-10-29 ENCOUNTER — PATIENT OUTREACH (OUTPATIENT)
Dept: OTHER | Facility: OTHER | Age: 43
End: 2018-10-29

## 2018-10-29 NOTE — PROGRESS NOTES
Last 5 Patient Entered Readings                                      Current 30 Day Average: 134/74     Recent Readings 10/23/2018 10/23/2018 10/23/2018 10/21/2018 10/21/2018    SBP (mmHg) 125 121 136 142 131    DBP (mmHg) 67 67 70 77 80    Pulse 66 63 66 61 63          Digital Medicine: Health  Follow Up    10/29: Left voicemail to follow up with Mr. Sarkis Saez Jr..  Current BP average 134/74 mmHg is not at goal, <130/80 mmHg.

## 2018-10-29 NOTE — LETTER
Robin Gan   November 26, 2018     Sarkis Saez Jr.  [address]  [facility] [address]       Dear Sarkis,    Thank you for enrolling in the [facility]. To participate in our programs, we ask that you submit weekly home readings through your [facility] account and maintain regular contact with your Care Team.  I have not heard from you in some time.     The Digital Medicine Care Team has attempted to reach you on multiple occasions to determine if you would like to continue participating in the program. While we encourage you to continue participating fully, we understand that circumstances may change.      To continue participating in the program, please contact me . If we do not hear back, you will be un-enrolled and your physician will be notified of your decision.    I look forward to hearing from you soon.    Sincerely,     Robin aGn  [facility] Digital Medicine Health   [phone]

## 2018-11-05 ENCOUNTER — PATIENT OUTREACH (OUTPATIENT)
Dept: OTHER | Facility: OTHER | Age: 43
End: 2018-11-05

## 2018-11-05 NOTE — PROGRESS NOTES
Last 5 Patient Entered Readings                                      Current 30 Day Average: 130/75     Recent Readings 10/29/2018 10/29/2018 10/29/2018 10/23/2018 10/23/2018    SBP (mmHg) 126 130 132 125 121    DBP (mmHg) 78 74 76 67 67    Pulse 67 63 65 66 63            Digital Medicine: Health  Follow Up    11/5: Left voicemail to follow up with Mr. Sarkis Saez Jr..  Current BP average 130/75 mmHg is at goal, <130/80 mmHg.  Will send [employer] message.

## 2018-11-05 NOTE — PROGRESS NOTES
Last 5 Patient Entered Readings                                      Current 30 Day Average: 130/75     Recent Readings 10/29/2018 10/29/2018 10/29/2018 10/23/2018 10/23/2018    SBP (mmHg) 126 130 132 125 121    DBP (mmHg) 78 74 76 67 67    Pulse 67 63 65 66 63        Hypertension Medications     amLODIPine (NORVASC) 10 MG tablet Take 1 tablet (10 mg total) by mouth once daily.    irbesartan (AVAPRO) 150 MG tablet Take 1 tablet (150 mg total) by mouth every evening.     Called patient for BP follow up. BP avg of 130/75 is a decrease from avg of 150/89 on 8/31/18. He states that everything is going well. He hasn't experienced any medication related issues, but he also says that he doesn't google side effects anymore due to anxiety. He is very pleased with the decline in BP and has no complaints today.     1) Continue current BP medications. Last CMP WNL. Will get updated labs early 2019   2) Encouraged patient to avoid excess consumption of dietary K and K-rich salt substitutes.   3) Patient knows to contact me with any non-urgent clinical changes or concerns. Go to ED or call [facility] on [facility] for emergencies.   4) Will defer lifestyle counseling to digital medicine health .   5) Will follow up with patient in 4-6 weeks to assess BP and need for medication adjustments. Will follow up sooner if BP trends up or down.     Amanda Glass, Pharm.D.   Digital Medicine Clinical Pharmacist  [phone]

## 2018-11-12 NOTE — PROGRESS NOTES
Last 5 Patient Entered Readings                                      Current 30 Day Average: 132/75     Recent Readings 11/9/2018 11/9/2018 11/9/2018 11/9/2018 11/9/2018    SBP (mmHg) 138 142 139 141 139    DBP (mmHg) 74 74 80 80 80    Pulse 62 62 63 61 64          Digital Medicine: Health  Follow Up    11/12: Left voicemail to follow up with Mr. Sarkis Saez Jr..  Current BP average 132/75 mmHg is not at goal, <130/80 mmHg.  Pt recently spoke with Amanda.  Will hold off on NC for one more attempt to call.

## 2018-11-26 NOTE — PROGRESS NOTES
"Last 5 Patient Entered Readings                                      Current 30 Day Average: 132/79     Recent Readings 11/25/2018 11/25/2018 11/25/2018 11/25/2018 11/25/2018    SBP (mmHg) 141 143 143 141 144    DBP (mmHg) 83 80 83 80 81    Pulse 68 69 69 70 72            Digital Medicine: Health  Follow Up    11/26: Left voicemail to follow up with Mr. Sarkis HERNANDEZ Se Cook.  Current BP average 132/79 mmHg is not at goal, <130/80 mmHg.  Pt called back, not sending NC.      Digital Medicine: Health  Follow Up    Patient states "he is in a very stressful quarter with work right now".  Pt states he is travelling for work this week.  Pt states "he will be taking readings".     Patient expressed interest in sleep study.  Encouraged pt to reach out to PharmD.  Will notify Amanda.       Lifestyle Modifications:    1.Dietary Modifications (Sodium intake <2,000mg/day, food labels, dining out):   Pt states "he is paying more attention to food labels" and trying to "make better choices when travelling".   Will send resources via e-mail and will follow up on next outreach.  Pt also admits to "not being very good on Thanksgiving weekend."    2.Physical Activity:   Pt reports he is trying to walk around more by "taking longer walking routes, making sure he gets up, and even walking at night at some."    3.Medication Therapy:   Patient has been compliant with the medication regimen.    4.Patient has the following medication side effects/concerns: None  (Frequency/Alleviating factors/Precipitating factors, etc.)     Follow up with Mr. Dockery MARY Saez Jr. completed. No further questions or concerns. Will continue to follow up to achieve health goals.  Patient is currently not at goal, 132/79 mmHg does exceed <130/80 mmHg.            "

## 2018-12-05 ENCOUNTER — PATIENT OUTREACH (OUTPATIENT)
Dept: OTHER | Facility: OTHER | Age: 43
End: 2018-12-05

## 2018-12-05 DIAGNOSIS — I10 HYPERTENSION, ESSENTIAL: ICD-10-CM

## 2018-12-05 RX ORDER — AMLODIPINE BESYLATE 10 MG/1
10 TABLET ORAL DAILY
Qty: 30 TABLET | Refills: 5 | Status: SHIPPED | OUTPATIENT
Start: 2018-12-05 | End: 2019-04-30 | Stop reason: SDUPTHER

## 2018-12-05 NOTE — PROGRESS NOTES
Last 5 Patient Entered Readings                                      Current 30 Day Average: 137/81     Recent Readings 12/2/2018 12/2/2018 12/1/2018 12/1/2018 12/1/2018    SBP (mmHg) 138 145 142 144 144    DBP (mmHg) 77 78 80 85 82    Pulse 70 69 68 69 72        Hypertension Medications     amLODIPine (NORVASC) 10 MG tablet Take 1 tablet (10 mg total) by mouth once daily.    irbesartan (AVAPRO) 150 MG tablet Take 1 tablet (150 mg total) by mouth every evening.     Patient informed health  that he needed a refill on amlodipine. I called to follow up. He is doing well on the current medication regimen. He contributes the increase in BP to stress from work. He has been traveling a lot and has not been eatings as well as he did in the past.     1) Continue current BP medications.   2) Discussed the possibility of increasing irbesartan to 300 mg if BP stays above goal of <130/<80  3) Patient knows to contact me with any non-urgent clinical changes or concerns. Go to ED or call [facility] for emergencies.   4) Will defer lifestyle counseling to digital medicine health .   5) Will continue to follow up.     Amanda Glass, Pharm.D.   Digital Medicine Clinical Pharmacist  [phone]

## 2018-12-26 ENCOUNTER — PATIENT OUTREACH (OUTPATIENT)
Dept: OTHER | Facility: OTHER | Age: 43
End: 2018-12-26

## 2018-12-26 NOTE — PROGRESS NOTES
Last 5 Patient Entered Readings                                      Current 30 Day Average: 136/80     Recent Readings 12/23/2018 12/23/2018 12/23/2018 12/17/2018 12/17/2018    SBP (mmHg) 131 126 139 131 135    DBP (mmHg) 68 73 75 70 72    Pulse 60 62 62 65 65            Digital Medicine: Health  Follow Up    12/26: Left voicemail to follow up with Mr. Sarkis Saez Jr..  Current BP average 136/80 mmHg is not at goal, <130/80 mmHg.

## 2018-12-31 ENCOUNTER — PATIENT MESSAGE (OUTPATIENT)
Dept: ADMINISTRATIVE | Facility: OTHER | Age: 43
End: 2018-12-31

## 2019-01-02 NOTE — PROGRESS NOTES
Last 5 Patient Entered Readings                                      Current 30 Day Average: 129/74     Recent Readings 12/23/2018 12/23/2018 12/23/2018 12/17/2018 12/17/2018    SBP (mmHg) 131 126 139 131 135    DBP (mmHg) 68 73 75 70 72    Pulse 60 62 62 65 65          1/2: Patient called and left VM.  I returned call and left VM.

## 2019-01-04 NOTE — PROGRESS NOTES
Last 5 Patient Entered Readings                                      Current 30 Day Average: 131/75     Recent Readings 1/2/2019 1/2/2019 1/2/2019 1/2/2019 12/23/2018    SBP (mmHg) 136 137 138 148 131    DBP (mmHg) 79 73 78 78 68    Pulse 78 77 75 80 60          Hypertension Medications     amLODIPine (NORVASC) 10 MG tablet Take 1 tablet (10 mg total) by mouth once daily.    irbesartan (AVAPRO) 150 MG tablet Take 1 tablet (150 mg total) by mouth every evening.     BP trending down since last encounter. Will continue to follow up. Patient knows to contact me with any non-urgent clinical changes or concerns. Go to ED or call [facility] for emergencies.     Will consider increasing irbesartan to 300 mg QD.     Ryan Templeton.D.   [facility] Clinical Pharmacist  [phone]

## 2019-01-11 NOTE — PROGRESS NOTES
Last 5 Patient Entered Readings                                      Current 30 Day Average: 133/77     Recent Readings 1/10/2019 1/10/2019 1/10/2019 1/2/2019 1/2/2019    SBP (mmHg) 135 148 149 136 137    DBP (mmHg) 78 87 86 79 73    Pulse 76 78 86 78 77          Digital Medicine: Health  Follow Up    1/11: Left voicemail to follow up with Mr. Sarkis Saez Jr..  Current BP average 133/77 mmHg is not at goal, <130/80 mmHg.

## 2019-01-18 NOTE — PROGRESS NOTES
"Last 5 Patient Entered Readings                                      Current 30 Day Average: 137/78     Recent Readings 1/17/2019 1/17/2019 1/17/2019 1/17/2019 1/10/2019    SBP (mmHg) 146 137 141 148 135    DBP (mmHg) 79 74 77 85 78    Pulse 73 75 73 73 76            Digital Medicine: Health  Follow Up    1/18: Left voicemail to follow up with Mr. Sarkis HERNANDEZ Se Cook.  Current BP average 137/78 mmHg is not at goal, <130/80 mmHg.  Sending [place] message. Patient called back.    Digital Medicine: Health  Follow Up    Patient attributes recent elevation in BP readings to excess stress from work.  Patient has been busy and is understaffed and is having to take on more responsibilities.  I encouraged patient to try to find some healthy way to de-stress.  Pt acknowledged.    Encouraged patient to try to switch times up for taking his BP readings.  Patient takes his readings in the evening when he gets home from work.  I encouraged patient to try in the morning as well, as it may be less stressful for him.  Also encouraged patient to sit and relax longer before taking his BP in the evening.    Lifestyle Modifications:    1.Dietary Modifications (Sodium intake <2,000mg/day, food labels, dining out):   Patient asked for resources on low sodium tips.  Sending resources via e-mail.     2.Physical Activity:   Encouraged patient to "take a walk" to clear his mind from the stress from work.  Will follow up.     3.Medication Therapy:   Patient has been compliant with the medication regimen.  Patient reports he is no longer taking clonazepam (doctor took him off) and states "he is happy about it".     4.Patient has the following medication side effects/concerns: None  (Frequency/Alleviating factors/Precipitating factors, etc.)     Follow up with Mr. Sarkis HERNANDEZ Se Leggett. completed. No further questions or concerns. Will continue to follow up to achieve health goals.  Patient is currently not at goal, 137/78 mmHg does " not exceed <130/80 mmHg.

## 2019-01-23 ENCOUNTER — PATIENT OUTREACH (OUTPATIENT)
Dept: OTHER | Facility: OTHER | Age: 44
End: 2019-01-23

## 2019-01-23 DIAGNOSIS — I10 HYPERTENSION, ESSENTIAL: Primary | ICD-10-CM

## 2019-01-23 NOTE — PROGRESS NOTES
Last 5 Patient Entered Readings                                      Current 30 Day Average: 139/79     Recent Readings 1/17/2019 1/17/2019 1/17/2019 1/17/2019 1/10/2019    SBP (mmHg) 146 137 141 148 135    DBP (mmHg) 79 74 77 85 78    Pulse 73 75 73 73 76        Hypertension Medications     amLODIPine (NORVASC) 10 MG tablet Take 1 tablet (10 mg total) by mouth once daily.    irbesartan (AVAPRO) 150 MG tablet Take 1 tablet (150 mg total) by mouth every evening.     Called patient for BP follow up. No answer. Left message for call back    Ryan Templetno.D.   [facility] Clinical Pharmacist  [phone]

## 2019-01-24 ENCOUNTER — TELEPHONE (OUTPATIENT)
Dept: INTERNAL MEDICINE | Facility: CLINIC | Age: 44
End: 2019-01-24

## 2019-01-24 RX ORDER — LOSARTAN POTASSIUM 100 MG/1
100 TABLET ORAL DAILY
Qty: 30 TABLET | Refills: 3 | Status: SHIPPED | OUTPATIENT
Start: 2019-01-24 | End: 2019-01-25 | Stop reason: SDUPTHER

## 2019-01-24 RX ORDER — CANDESARTAN 32 MG/1
32 TABLET ORAL DAILY
Qty: 30 TABLET | Refills: 3 | Status: SHIPPED | OUTPATIENT
Start: 2019-01-24 | End: 2019-01-25 | Stop reason: SDUPTHER

## 2019-01-24 RX ORDER — IRBESARTAN 300 MG/1
300 TABLET ORAL DAILY
Qty: 90 TABLET | Refills: 1 | Status: SHIPPED | OUTPATIENT
Start: 2019-01-24 | End: 2019-01-24 | Stop reason: ALTCHOICE

## 2019-01-24 NOTE — PROGRESS NOTES
Last 5 Patient Entered Readings                                      Current 30 Day Average: 139/79     Recent Readings 1/17/2019 1/17/2019 1/17/2019 1/17/2019 1/10/2019    SBP (mmHg) 146 137 141 148 135    DBP (mmHg) 79 74 77 85 78    Pulse 73 75 73 73 76        Hypertension Medications     amLODIPine (NORVASC) 10 MG tablet Take 1 tablet (10 mg total) by mouth once daily.    irbesartan (AVAPRO) 150 MG tablet Take 1 tablet (150 mg total) by mouth every evening.     Patient returned my call for BP follow up. He contributes the increase in BP to stress at work and at home.     1) BP exceeds goal of <130/<80. Continue amlodipine. Increase irbesartan to 300 mg QD  2) Patient knows to contact me with any non-urgent clinical changes or concerns. Go to ED or call [facility] for emergencies.   3) Will defer lifestyle counseling to digital medicine health .   4) Will continue to follow up.     Amanda Glass, Pharm.D.   Digital Medicine Clinical Pharmacist  [phone]

## 2019-01-24 NOTE — TELEPHONE ENCOUNTER
----- Message from Eleuterio Chung sent at 1/24/2019  1:14 PM CST -----  Contact: Zora [phone]  [facility] pharmacy is calling to discuss medication irbesartan (AVAPRO) 300 MG tablet ,. Stated medication is on back order . Please call and advise, Thanks

## 2019-01-24 NOTE — PROGRESS NOTES
Last 5 Patient Entered Readings                                      Current 30 Day Average: 139/79     Recent Readings 1/17/2019 1/17/2019 1/17/2019 1/17/2019 1/10/2019    SBP (mmHg) 146 137 141 148 135    DBP (mmHg) 79 74 77 85 78    Pulse 73 75 73 73 76        Hypertension Medications     amLODIPine (NORVASC) 10 MG tablet Take 1 tablet (10 mg total) by mouth once daily.    irbesartan (AVAPRO) 300 MG tablet Take 1 tablet (300 mg total) by mouth once daily.     All strengths of irbesartan on backorder at patient's pharmacy. Only losartan and candesartan in stock. Sent in order for both. Patient can purchase whichever one he can afford, but I would prefer candesartan.     Will continue to follow up. Patient knows to contact me with any non-urgent clinical changes or concerns. Go to ED or call [facility] for emergencies.     Amanda Glass, Pharm.D.   [facility] Clinical Pharmacist  [phone]

## 2019-01-25 ENCOUNTER — PATIENT MESSAGE (OUTPATIENT)
Dept: INTERNAL MEDICINE | Facility: CLINIC | Age: 44
End: 2019-01-25

## 2019-01-25 RX ORDER — IRBESARTAN 150 MG/1
300 TABLET ORAL NIGHTLY
Qty: 180 TABLET | Refills: 3 | Status: SHIPPED | OUTPATIENT
Start: 2019-01-25 | End: 2019-04-30 | Stop reason: SDUPTHER

## 2019-01-25 NOTE — TELEPHONE ENCOUNTER
Pt called back  Pt said he was on call with the HPDM staff and she said will increase the dose of Irbesartan from 150 to 300 but Irbesartan is on back order now. Therefore, she called in 2 ex (Candesartan and Losartan) to the pharmacy for pt and told pt that he can choose either of them. She told him that Candesartan works better than Losartan. He came to the pharmacy and the pharm. did not have Candesartan so pt took Losartan.    Pt said he does not know what does he will be taking for this new bp pill but still has some Irbesartan and will continue on Irbesartan till Monday when the HPDM staff back to work on Monday.    Pt said he would like Dr. Hansen's advises since you are his PCP and pt trusts you the most  Please give pt a call if possible. Thank you

## 2019-01-25 NOTE — TELEPHONE ENCOUNTER
Chart indicates that digital hypertension clinic changed meds to losartan - please call and confirm with patient. Thank you

## 2019-01-26 NOTE — TELEPHONE ENCOUNTER
Due to issues with you insurance approving medication, or recall/supply issues, I reordered medication (s) from our primary care pharmacy.    They do a nice job with prior authorizations and supply issues. Number is [phone] to confirm the status of medication.    Please advise patient. Thank you    I am writing this for the 300 mg irbesartan, please stop the candesartan and losartan.

## 2019-01-28 ENCOUNTER — TELEPHONE (OUTPATIENT)
Dept: INTERNAL MEDICINE | Facility: CLINIC | Age: 44
End: 2019-01-28

## 2019-01-28 NOTE — PROGRESS NOTES
Last 5 Patient Entered Readings                                      Current 30 Day Average: 138/77     Recent Readings 1/25/2019 1/25/2019 1/25/2019 1/25/2019 1/17/2019    SBP (mmHg) 134 132 143 138 146    DBP (mmHg) 69 67 75 68 79    Pulse 66 65 67 71 73        Hypertension Medications     amLODIPine (NORVASC) 10 MG tablet Take 1 tablet (10 mg total) by mouth once daily.    irbesartan (AVAPRO) 150 MG tablet Take 2 tablets (300 mg total) by mouth every evening.     Patient called to inform me that [facility] only filled losartan and did not give him the option to get candesartan filled. Patient called PCP and asked him to handle it. [facility] to confirm that patient will take two irbesartan 150 mg tablets (300 mg total). Patient verbalized understanding.     Amanda Glass, Pharm.D.   [employer] [facility] Clinical Pharmacist  [phone]

## 2019-01-28 NOTE — TELEPHONE ENCOUNTER
Primary care pharmacy called in sayign that 300mg Irbesartan is on back order now and asking if pt can take 150mg Irbesartan with 2 tablets QD. Pt is at pharmacy now  Please advise thank you

## 2019-01-28 NOTE — TELEPHONE ENCOUNTER
----- Message from Zunilda Hennessy sent at 1/28/2019  8:43 AM CST -----  Contact: Rosa [phone]  Prescription Alternative Needed:     The pharmacy needs alternative on the following RX:    irbesartan (AVAPRO) 150 MG tablet    Reason: Drug on backorder.    Pharmacy: [facility] - [address] [facility]    Please advise.    Thank You

## 2019-02-11 ENCOUNTER — PATIENT OUTREACH (OUTPATIENT)
Dept: OTHER | Facility: OTHER | Age: 44
End: 2019-02-11

## 2019-02-11 NOTE — PROGRESS NOTES
Last 5 Patient Entered Readings                                      Current 30 Day Average: 136/74     Recent Readings 1/31/2019 1/31/2019 1/31/2019 1/31/2019 1/25/2019    SBP (mmHg) 129 134 133 142 134    DBP (mmHg) 72 73 76 75 69    Pulse 65 65 66 64 66        Hypertension Medications     amLODIPine (NORVASC) 10 MG tablet Take 1 tablet (10 mg total) by mouth once daily.    irbesartan (AVAPRO) 150 MG tablet Take 2 tablets (300 mg total) by mouth every evening.     2/13/19- Called patient for BP follow up. No answer. Left message for call back    Called patient for BP follow up. No answer. Left message for call back    Richard TempletonD.   [facility] Clinical Pharmacist  [phone]

## 2019-02-15 ENCOUNTER — PATIENT OUTREACH (OUTPATIENT)
Dept: OTHER | Facility: OTHER | Age: 44
End: 2019-02-15

## 2019-02-15 NOTE — PROGRESS NOTES
Last 5 Patient Entered Readings                                      Current 30 Day Average: 134/74     Recent Readings 2/11/2019 2/11/2019 2/11/2019 1/31/2019 1/31/2019    SBP (mmHg) 128 125 134 129 134    DBP (mmHg) 71 77 79 72 73    Pulse 70 70 69 65 65          Digital Medicine: Health  Follow Up    1/15: Left voicemail to follow up with Mr. Sarkis Saez Jr..  Current BP average 134/74 mmHg is not at goal, <130/80 mmHg.  PharmD is also attempting outreach, will push 2-3 weeks.

## 2019-02-26 ENCOUNTER — PATIENT MESSAGE (OUTPATIENT)
Dept: ADMINISTRATIVE | Facility: OTHER | Age: 44
End: 2019-02-26

## 2019-02-26 NOTE — PROGRESS NOTES
Last 5 Patient Entered Readings                                      Current 30 Day Average: 129/75     Recent Readings 2/11/2019 2/11/2019 2/11/2019 1/31/2019 1/31/2019    SBP (mmHg) 128 125 134 129 134    DBP (mmHg) 71 77 79 72 73    Pulse 70 70 69 65 65        Hypertension Medications     amLODIPine (NORVASC) 10 MG tablet Take 1 tablet (10 mg total) by mouth once daily.    irbesartan (AVAPRO) 150 MG tablet Take 2 tablets (300 mg total) by mouth every evening.     Called patient for BP follow up after increasing irbesartan to 300 mg QD. He has no complaints related to the medication. He says for the past month he has been suffering with really bad anxiety. He experienced night sweats and thought he was going to die. He says he has not taken BP recently because he thought it would be elevated due to anxiety. He had an appointment with psychiatry today and MD re-started him on clonazepam.     1) BP meets goal of <130/<80. Continue current BP medications. Reviewed last labs  2) Advised patient to inform PCP of night sweats if this continues after anxiety is controlled.  3) Patient knows to contact me with any non-urgent clinical changes or concerns. Go to ED or call [facility] for emergencies.   4) Will defer lifestyle counseling to digital medicine health .   5) Will continue to follow up.     Amanda Glass, Pharm.D.   Digital Medicine Clinical Pharmacist  [phone]

## 2019-03-06 NOTE — PROGRESS NOTES
Last 5 Patient Entered Readings                                      Current 30 Day Average: 127/76     Recent Readings 2/27/2019 2/27/2019 2/27/2019 2/27/2019 2/11/2019    SBP (mmHg) 125 132 135 140 128    DBP (mmHg) 76 74 78 75 71    Pulse 67 68 69 69 70          Digital Medicine: Health  Follow Up    3/6: Left voicemail to follow up with Mr. Sarkis Saez Jr..  Current BP average 127/76 mmHg is at goal, <130/80 mmHg.  Sending [employer] message.

## 2019-03-19 NOTE — PROGRESS NOTES
Last 5 Patient Entered Readings                                      Current 30 Day Average: 130/76     Recent Readings 3/11/2019 3/11/2019 3/11/2019 3/11/2019 3/11/2019    SBP (mmHg) 134 126 138 137 132    DBP (mmHg) 74 76 75 80 79    Pulse 70 69 72 73 72          Digital Medicine: Health  Follow Up    3/19: Left voicemail to follow up with Mr. Sarkis Saez Jr..  Current BP average 130/76 mmHg is at goal, <130/80 mmHg.  BP is controlled. No readings since 3/11.  Patient spoke with PharmD on 2/26. Will continue to follow up.

## 2019-04-01 NOTE — PROGRESS NOTES
"Last 5 Patient Entered Readings                                      Current 30 Day Average: 132/77     Recent Readings 3/25/2019 3/25/2019 3/25/2019 3/11/2019 3/11/2019    SBP (mmHg) 129 127 130 134 126    DBP (mmHg) 75 75 78 74 76    Pulse 77 79 80 70 69        Digital Medicine: Health  Follow Up    Patient reports his work load "has tripled" since January.  Patient reports he has been under a tremendous amount of stress.  Patient reports everything should be back to normal April 22nd.      Reminded patient to submit one BP reading per week regularly so we can continue to monitor his progress.  Patient verbalized understanding.     Lifestyle Modifications:    1.Dietary Modifications (Sodium intake <2,000mg/day, food labels, dining out):   Pt denies needing any other help with nutrition at this time.    2.Physical Activity:   Patient reports he has been "trying his hardest" to walk every evening.  Encouraged patient to keep up the great work.     3.Medication Therapy:   Patient has been compliant with the medication regimen.    4.Patient has the following medication side effects/concerns: None  (Frequency/Alleviating factors/Precipitating factors, etc.)     Follow up with Mr. Sarkis Saez Jr. completed. No further questions or concerns. Will continue to follow up to achieve health goals.  Patient is currently not at goal, 132/77 mmHg does exceed <130/80 mmHg.      "

## 2019-04-07 ENCOUNTER — OFFICE VISIT (OUTPATIENT)
Dept: URGENT CARE | Facility: CLINIC | Age: 44
End: 2019-04-07
Payer: COMMERCIAL

## 2019-04-07 VITALS
WEIGHT: 315 LBS | SYSTOLIC BLOOD PRESSURE: 149 MMHG | DIASTOLIC BLOOD PRESSURE: 82 MMHG | RESPIRATION RATE: 17 BRPM | TEMPERATURE: 98 F | OXYGEN SATURATION: 98 % | BODY MASS INDEX: 42.66 KG/M2 | HEIGHT: 72 IN | HEART RATE: 92 BPM

## 2019-04-07 DIAGNOSIS — M54.16 LUMBAR RADICULOPATHY: Primary | ICD-10-CM

## 2019-04-07 PROCEDURE — 3008F PR BODY MASS INDEX (BMI) DOCUMENTED: ICD-10-PCS | Mod: CPTII,S$GLB,, | Performed by: NURSE PRACTITIONER

## 2019-04-07 PROCEDURE — 3008F BODY MASS INDEX DOCD: CPT | Mod: CPTII,S$GLB,, | Performed by: NURSE PRACTITIONER

## 2019-04-07 PROCEDURE — 99214 PR OFFICE/OUTPT VISIT, EST, LEVL IV, 30-39 MIN: ICD-10-PCS | Mod: 25,S$GLB,, | Performed by: NURSE PRACTITIONER

## 2019-04-07 PROCEDURE — 3077F PR MOST RECENT SYSTOLIC BLOOD PRESSURE >= 140 MM HG: ICD-10-PCS | Mod: CPTII,S$GLB,, | Performed by: NURSE PRACTITIONER

## 2019-04-07 PROCEDURE — 3079F PR MOST RECENT DIASTOLIC BLOOD PRESSURE 80-89 MM HG: ICD-10-PCS | Mod: CPTII,S$GLB,, | Performed by: NURSE PRACTITIONER

## 2019-04-07 PROCEDURE — 3077F SYST BP >= 140 MM HG: CPT | Mod: CPTII,S$GLB,, | Performed by: NURSE PRACTITIONER

## 2019-04-07 PROCEDURE — 99214 OFFICE O/P EST MOD 30 MIN: CPT | Mod: 25,S$GLB,, | Performed by: NURSE PRACTITIONER

## 2019-04-07 PROCEDURE — 96372 PR INJECTION,THERAP/PROPH/DIAG2ST, IM OR SUBCUT: ICD-10-PCS | Mod: S$GLB,,, | Performed by: EMERGENCY MEDICINE

## 2019-04-07 PROCEDURE — 3079F DIAST BP 80-89 MM HG: CPT | Mod: CPTII,S$GLB,, | Performed by: NURSE PRACTITIONER

## 2019-04-07 PROCEDURE — 96372 THER/PROPH/DIAG INJ SC/IM: CPT | Mod: S$GLB,,, | Performed by: EMERGENCY MEDICINE

## 2019-04-07 RX ORDER — METHYLPREDNISOLONE 4 MG/1
4 TABLET ORAL DAILY
Qty: 1 PACKAGE | Refills: 0 | Status: SHIPPED | OUTPATIENT
Start: 2019-04-07 | End: 2019-04-30 | Stop reason: ALTCHOICE

## 2019-04-07 RX ORDER — KETOROLAC TROMETHAMINE 30 MG/ML
30 INJECTION, SOLUTION INTRAMUSCULAR; INTRAVENOUS
Status: COMPLETED | OUTPATIENT
Start: 2019-04-07 | End: 2019-04-07

## 2019-04-07 RX ADMIN — KETOROLAC TROMETHAMINE 30 MG: 30 INJECTION, SOLUTION INTRAMUSCULAR; INTRAVENOUS at 01:04

## 2019-04-07 NOTE — PROGRESS NOTES
Subjective:       Patient ID: Sarkis Saez Jr. is a 44 y.o. male.    Vitals:  height is 6' (1.829 m) and weight is 145.2 kg (320 lb) (abnormal). His oral temperature is 98.1 °F (36.7 °C). His blood pressure is 149/82 (abnormal) and his pulse is 92. His respiration is 17 and oxygen saturation is 98%.     Chief Complaint: Sciatica and Back Pain     This is a 44-year-old male presents today with complaints of lower back pain that extends to his right gluteal area and down his right leg.   He does have a lumbar disc herniation and sees Neurology.  He states he exacerbated the issue on Tuesday after sleeping on his sons sofa.  He states he has been driving a lot and the pain has been worsening.  He has a lidocaine patch on which is providing mild relief of symptoms.    Back Pain   This is a new problem. The current episode started in the past 7 days. The problem occurs constantly. The problem has been gradually worsening since onset. The pain is present in the gluteal. The quality of the pain is described as shooting. Radiates to: right calf. The pain is at a severity of 8/10. The pain is severe. The pain is the same all the time. The symptoms are aggravated by bending, sitting and lying down. Associated symptoms include leg pain. Pertinent negatives include no abdominal pain, bladder incontinence, bowel incontinence, dysuria or numbness. He has tried walking (Aleve, pain patch) for the symptoms. The treatment provided moderate relief.       Constitution: Negative for fatigue.   Gastrointestinal: Negative for abdominal pain and bowel incontinence.   Genitourinary: Negative for dysuria, urgency, bladder incontinence and hematuria.   Musculoskeletal: Positive for back pain. Negative for muscle cramps and history of spine disorder.   Skin: Negative for rash.   Neurological: Negative for coordination disturbances, numbness and tingling.       Objective:      Physical Exam   Constitutional: He is oriented to person,  place, and time. Vital signs are normal. He appears well-developed and well-nourished. He is active and cooperative. No distress.   HENT:   Head: Normocephalic and atraumatic.   Nose: Nose normal.   Mouth/Throat: Oropharynx is clear and moist and mucous membranes are normal.   Eyes: Conjunctivae and lids are normal.   Neck: Trachea normal, normal range of motion, full passive range of motion without pain and phonation normal. Neck supple.   Cardiovascular: Normal rate, regular rhythm, normal heart sounds, intact distal pulses and normal pulses.   Pulmonary/Chest: Effort normal and breath sounds normal.   Abdominal: Soft. Normal appearance and bowel sounds are normal. He exhibits no abdominal bruit, no pulsatile midline mass and no mass.   Musculoskeletal: He exhibits no edema or deformity.   POSITIVE ST LEG RAISE TO RIGHT  FULL ROM B LE WITH 5/5 STRENGTH  2+DTR PATELLA AND ACHILLES  NVIT DISTALLY WITH SILT AND 2+BCR  ABLE TO AMBULATE WITH SMOOTH RHYTHMIC GAIT    Pain with sitting     Neurological: He is alert and oriented to person, place, and time. He has normal strength and normal reflexes. No sensory deficit.   Skin: Skin is warm, dry and intact. He is not diaphoretic.   Psychiatric: He has a normal mood and affect. His speech is normal and behavior is normal. Judgment and thought content normal. Cognition and memory are normal.   Nursing note and vitals reviewed.      Assessment:       1. Lumbar radiculopathy        Plan:         Lumbar radiculopathy  -     methylPREDNISolone (MEDROL DOSEPACK) 4 mg tablet; Take 1 tablet (4 mg total) by mouth once daily. use as directed  Dispense: 1 Package; Refill: 0  -     ketorolac injection 30 mg            Patient Instructions     Monitor blood pressures while on Medrol Dosepak.  If your bottom number elevates greater than 110 stop prescription.    You must understand that you've received an Urgent Care treatment only and that you may be released before all your medical  problems are known or treated. You, the patient, will arrange for follow up care as instructed.  If your condition worsens we recommend that you receive another evaluation at the emergency room immediately or contact your primary medical clinics after hours call service to discuss your concerns.  Please return here or go to the Emergency Department for any concerns or worsening of condition.      Sciatica    Sciatica is a condition that causes pain in the lower back that spreads down into the buttock, hip, and leg. Sometimes the leg pain can happen without any back pain. Sciatica happens when a spinal nerve is irritated or has pressure put on it as comes out of the spinal canal in the lower back. This most often happens when a bulge or rupture of a nearby spinal disk presses on the nerve. Sciatica can also be caused by a narrowing of the spinal canal (spinal stenosis) or spasm of the muscle in the buttocks that the sciatic nerve passes through (pyriform muscle). Sciatica is also called lumbar radiculopathy.  Sciatica may begin after a sudden twisting or bending force, such as in a car accident. Or it can happen after a simple awkward movement. In either case, muscle spasm often also happens. Muscle spasm makes the pain worse.  A healthcare provider makes a diagnosis of sciatica from your symptoms and a physical exam. Unless you had an injury from a car accident or fall, you usually wont have X-rays taken at this time. This is because the nerves and disks in your back cant be seen on an X-ray. If the provider sees signs of a compressed nerve, you will need to schedule an MRI scan as an outpatient. Signs of a compressed nerve include loss of strength in a leg.  Most sciatica gets better with medicine, exercise, and physical therapy. If your symptoms continue after at least 3 months of medical treatment, you may need surgery or injections to your lower back.  Home care  Follow these tips when caring for yourself at  home:  · You may need to stay in bed the first few days. But as soon as possible, begin sitting up or walking. This will help you avoid problems that come from staying in bed for long periods.  · When in bed, try to find a position that is comfortable. A firm mattress is best. Try lying flat on your back with pillows under your knees. You can also try lying on your side with your knees bent up toward your chest and a pillow between your knees.  · Avoid sitting for long periods. This puts more stress on your lower back than standing or walking.  · Use heat from a hot shower, hot bath, or heating pad to help ease pain. Massage can also help. You can also try using an ice pack. You can make your own ice pack by putting ice cubes in a plastic bag. Wrap the bag in a thin towel. Try both heat and cold to see which works best. Use the method that feels best for 20 minutes several times a day.  · You may use acetaminophen or ibuprofen to ease pain, unless another pain medicine was prescribed. Note: If you have chronic liver or kidney disease, talk with your healthcare provider before taking these medicines. Also talk with your provider if youve had a stomach ulcer or gastrointestinal bleeding.  · Use safe lifting methods. Dont lift anything heavier than 15 pounds until all of the pain is gone.  Follow-up care  Follow up with your healthcare provider, or as advised. You may need physical therapy or additional tests.  If X-rays were taken, a radiologist will look at them. You will be told of any new findings that may affect your care.  When to seek medical advice  Call your healthcare provider right away if any of these occur:  · Pain gets worse even after taking prescribed medicine  · Weakness or numbness in 1 or both legs or hips  · Numbness in your groin or genital area  · You cant control your bowel or bladder  · Fever  · Redness or swelling over your back or spine   Date Last Reviewed: 8/1/2016  © 3133-3656 [employer] [address] [place] [address], [place] [address]. All rights reserved. This information is not intended as a substitute for professional medical care. Always follow your healthcare professional's instructions.

## 2019-04-07 NOTE — PATIENT INSTRUCTIONS
Monitor blood pressures while on Medrol Dosepak.  If your bottom number elevates greater than 110 stop prescription.    You must understand that you've received an Urgent Care treatment only and that you may be released before all your medical problems are known or treated. You, the patient, will arrange for follow up care as instructed.  If your condition worsens we recommend that you receive another evaluation at the emergency room immediately or contact your primary medical clinics after hours call service to discuss your concerns.  Please return here or go to the Emergency Department for any concerns or worsening of condition.      Sciatica    Sciatica is a condition that causes pain in the lower back that spreads down into the buttock, hip, and leg. Sometimes the leg pain can happen without any back pain. Sciatica happens when a spinal nerve is irritated or has pressure put on it as comes out of the spinal canal in the lower back. This most often happens when a bulge or rupture of a nearby spinal disk presses on the nerve. Sciatica can also be caused by a narrowing of the spinal canal (spinal stenosis) or spasm of the muscle in the buttocks that the sciatic nerve passes through (pyriform muscle). Sciatica is also called lumbar radiculopathy.  Sciatica may begin after a sudden twisting or bending force, such as in a car accident. Or it can happen after a simple awkward movement. In either case, muscle spasm often also happens. Muscle spasm makes the pain worse.  A healthcare provider makes a diagnosis of sciatica from your symptoms and a physical exam. Unless you had an injury from a car accident or fall, you usually wont have X-rays taken at this time. This is because the nerves and disks in your back cant be seen on an X-ray. If the provider sees signs of a compressed nerve, you will need to schedule an MRI scan as an outpatient. Signs of a compressed nerve include loss of strength in a leg.  Most sciatica  gets better with medicine, exercise, and physical therapy. If your symptoms continue after at least 3 months of medical treatment, you may need surgery or injections to your lower back.  Home care  Follow these tips when caring for yourself at home:  · You may need to stay in bed the first few days. But as soon as possible, begin sitting up or walking. This will help you avoid problems that come from staying in bed for long periods.  · When in bed, try to find a position that is comfortable. A firm mattress is best. Try lying flat on your back with pillows under your knees. You can also try lying on your side with your knees bent up toward your chest and a pillow between your knees.  · Avoid sitting for long periods. This puts more stress on your lower back than standing or walking.  · Use heat from a hot shower, hot bath, or heating pad to help ease pain. Massage can also help. You can also try using an ice pack. You can make your own ice pack by putting ice cubes in a plastic bag. Wrap the bag in a thin towel. Try both heat and cold to see which works best. Use the method that feels best for 20 minutes several times a day.  · You may use acetaminophen or ibuprofen to ease pain, unless another pain medicine was prescribed. Note: If you have chronic liver or kidney disease, talk with your healthcare provider before taking these medicines. Also talk with your provider if youve had a stomach ulcer or gastrointestinal bleeding.  · Use safe lifting methods. Dont lift anything heavier than 15 pounds until all of the pain is gone.  Follow-up care  Follow up with your healthcare provider, or as advised. You may need physical therapy or additional tests.  If X-rays were taken, a radiologist will look at them. You will be told of any new findings that may affect your care.  When to seek medical advice  Call your healthcare provider right away if any of these occur:  · Pain gets worse even after taking prescribed  medicine  · Weakness or numbness in 1 or both legs or hips  · Numbness in your groin or genital area  · You cant control your bowel or bladder  · Fever  · Redness or swelling over your back or spine   Date Last Reviewed: 8/1/2016  © 0997-9564 [employer]. [address] [place] [address]. All rights reserved. This information is not intended as a substitute for professional medical care. Always follow your healthcare professional's instructions.

## 2019-04-08 ENCOUNTER — PATIENT OUTREACH (OUTPATIENT)
Dept: OTHER | Facility: OTHER | Age: 44
End: 2019-04-08

## 2019-04-08 NOTE — PROGRESS NOTES
Last 5 Patient Entered Readings                                      Current 30 Day Average: 142/79     Recent Readings 4/8/2019 4/8/2019 4/7/2019 4/7/2019 4/7/2019    SBP (mmHg) 173 172 164 166 163    DBP (mmHg) 100 99 86 88 85    Pulse 88 95 70 67 70        Hypertension Medications     amLODIPine (NORVASC) 10 MG tablet Take 1 tablet (10 mg total) by mouth once daily.    irbesartan (AVAPRO) 150 MG tablet Take 2 tablets (300 mg total) by mouth every evening.     Patient called concerned because BP was 173/100 mmHg. He says this is the highest his BP has ever been. He says NP at urgent care advised him to stop the medrol dose pack if SBP > 110. Patient says he may contact Dr. Angulo for advice.     Advised patient to continue monitoring BP and proceed to ED if elevated BP is associated with severe headache, chest pain, SOB, unilateral weakness, changes in vision, etc. Patient knows to contact me with any non-urgent clinical changes or concerns. Go to ED or call [facility] for emergencies. I will continue to follow up.     Amanda Linder, Pharm.D.   [facility].sh Medicine Clinical Pharmacist  [phone]

## 2019-04-08 NOTE — PROGRESS NOTES
Last 5 Patient Entered Readings                                      Current 30 Day Average: 142/79     Recent Readings 4/7/2019 4/7/2019 4/7/2019 3/25/2019 3/25/2019    SBP (mmHg) 164 166 163 129 127    DBP (mmHg) 86 88 85 75 75    Pulse 70 67 70 77 79        Hypertension Medications     amLODIPine (NORVASC) 10 MG tablet Take 1 tablet (10 mg total) by mouth once daily.    irbesartan (AVAPRO) 150 MG tablet Take 2 tablets (300 mg total) by mouth every evening.     Patient called to inform me that he is suffering from sciatica. He went to urgent care and got a Toradol injection and medrol dose pack. He is concerned about elevated BP and says that it hurts really bad to sit still and take his BP. He asked what OTC medication he can take to help with pain.     1) BP exceeds goal of <130/<80. Continue current BP medications.   2) Recommended acetaminophen vs. NSAIDs for pain.   3) Patient knows to contact me with any non-urgent clinical changes or concerns. Go to ED or call [facility] for emergencies.   4) Will defer lifestyle counseling to digital medicine health .   5) Will continue to follow up.     Amanda Glass, Pharm.D.   Digital Medicine Clinical Pharmacist  [phone]

## 2019-04-15 ENCOUNTER — TELEPHONE (OUTPATIENT)
Dept: SPINE | Facility: CLINIC | Age: 44
End: 2019-04-15

## 2019-04-15 ENCOUNTER — TELEPHONE (OUTPATIENT)
Dept: INTERNAL MEDICINE | Facility: CLINIC | Age: 44
End: 2019-04-15

## 2019-04-15 DIAGNOSIS — M54.5 LOW BACK PAIN, UNSPECIFIED BACK PAIN LATERALITY, UNSPECIFIED CHRONICITY, WITH SCIATICA PRESENCE UNSPECIFIED: Primary | ICD-10-CM

## 2019-04-15 NOTE — TELEPHONE ENCOUNTER
I recommend the following -     OTC Ibuprofen 200 mg tablets. Take 3 tablets every 6 hours for 3-5 days as needed for pain, fever.    Thank you

## 2019-04-15 NOTE — TELEPHONE ENCOUNTER
Spoke to pt and scheduled for tmr with back and spine clinic at [facility] at 2:30pm    Pt is really in pain right now and [place] have to [place] and [place] to [place] for 2 - 3 weeks. Pt asked what he needs to do now  (medications or something) to help him with the pain until he sees back and spine tmr  Please advise thank you    Pt took tylenol and then aleve but not better

## 2019-04-15 NOTE — TELEPHONE ENCOUNTER
Please see below and advise. Thank you  ----- Message from Sofia Mario sent at 4/15/2019  9:08 AM CDT -----  Contact: Patient [phone]  Patient would like to get medical advice.  Symptoms (please be specific):  Leg pain due to bulging disc in back still hurts  How long has patient had these symptoms:  Since he went to [facility] urgent care Sunday 4/7/19  Pharmacy name and phone # [facility] - [place] [address] [phone] (Phone) [phone] (Fax)    Comments:  He finished the steroid pack and he had a tordol shot in his hip.

## 2019-04-15 NOTE — TELEPHONE ENCOUNTER
Please contact patient and explain I would suggest a referral to the back and Spine Clinic.    Please see referral orders and please call patient to schedule a consult with back and spine. Thank you.

## 2019-04-16 ENCOUNTER — HOSPITAL ENCOUNTER (OUTPATIENT)
Dept: RADIOLOGY | Facility: OTHER | Age: 44
Discharge: HOME | End: 2019-04-16
Attending: PHYSICIAN ASSISTANT
Payer: COMMERCIAL

## 2019-04-16 ENCOUNTER — PATIENT OUTREACH (OUTPATIENT)
Dept: OTHER | Facility: OTHER | Age: 44
End: 2019-04-16

## 2019-04-16 ENCOUNTER — OFFICE VISIT (OUTPATIENT)
Dept: SPINE | Facility: CLINIC | Age: 44
End: 2019-04-16
Payer: COMMERCIAL

## 2019-04-16 VITALS
WEIGHT: 315 LBS | SYSTOLIC BLOOD PRESSURE: 158 MMHG | BODY MASS INDEX: 42.66 KG/M2 | HEIGHT: 72 IN | DIASTOLIC BLOOD PRESSURE: 85 MMHG | HEART RATE: 94 BPM | TEMPERATURE: 100 F

## 2019-04-16 DIAGNOSIS — M54.16 LUMBAR RADICULOPATHY: ICD-10-CM

## 2019-04-16 DIAGNOSIS — M54.41 ACUTE RIGHT-SIDED LOW BACK PAIN WITH RIGHT-SIDED SCIATICA: Primary | ICD-10-CM

## 2019-04-16 DIAGNOSIS — M47.26 OTHER SPONDYLOSIS WITH RADICULOPATHY, LUMBAR REGION: ICD-10-CM

## 2019-04-16 DIAGNOSIS — M54.5 LOW BACK PAIN, UNSPECIFIED BACK PAIN LATERALITY, UNSPECIFIED CHRONICITY, WITH SCIATICA PRESENCE UNSPECIFIED: ICD-10-CM

## 2019-04-16 DIAGNOSIS — M51.36 DDD (DEGENERATIVE DISC DISEASE), LUMBAR: ICD-10-CM

## 2019-04-16 PROCEDURE — 72100 XR LUMBAR SPINE AP AND LAT WITH FLEX/EXT: ICD-10-PCS | Mod: 26,,, | Performed by: RADIOLOGY

## 2019-04-16 PROCEDURE — 3077F SYST BP >= 140 MM HG: CPT | Mod: CPTII,S$GLB,, | Performed by: PHYSICIAN ASSISTANT

## 2019-04-16 PROCEDURE — 72120 X-RAY BEND ONLY L-S SPINE: CPT | Mod: 26,,, | Performed by: RADIOLOGY

## 2019-04-16 PROCEDURE — 3079F PR MOST RECENT DIASTOLIC BLOOD PRESSURE 80-89 MM HG: ICD-10-PCS | Mod: CPTII,S$GLB,, | Performed by: PHYSICIAN ASSISTANT

## 2019-04-16 PROCEDURE — 72100 X-RAY EXAM L-S SPINE 2/3 VWS: CPT | Mod: 26,,, | Performed by: RADIOLOGY

## 2019-04-16 PROCEDURE — 3077F PR MOST RECENT SYSTOLIC BLOOD PRESSURE >= 140 MM HG: ICD-10-PCS | Mod: CPTII,S$GLB,, | Performed by: PHYSICIAN ASSISTANT

## 2019-04-16 PROCEDURE — 3008F BODY MASS INDEX DOCD: CPT | Mod: CPTII,S$GLB,, | Performed by: PHYSICIAN ASSISTANT

## 2019-04-16 PROCEDURE — 99999 PR PBB SHADOW E&M-EST. PATIENT-LVL IV: ICD-10-PCS | Mod: PBBFAC,,, | Performed by: PHYSICIAN ASSISTANT

## 2019-04-16 PROCEDURE — 99999 PR PBB SHADOW E&M-EST. PATIENT-LVL IV: CPT | Mod: PBBFAC,,, | Performed by: PHYSICIAN ASSISTANT

## 2019-04-16 PROCEDURE — 3008F PR BODY MASS INDEX (BMI) DOCUMENTED: ICD-10-PCS | Mod: CPTII,S$GLB,, | Performed by: PHYSICIAN ASSISTANT

## 2019-04-16 PROCEDURE — 3079F DIAST BP 80-89 MM HG: CPT | Mod: CPTII,S$GLB,, | Performed by: PHYSICIAN ASSISTANT

## 2019-04-16 PROCEDURE — 99214 OFFICE O/P EST MOD 30 MIN: CPT | Mod: S$GLB,,, | Performed by: PHYSICIAN ASSISTANT

## 2019-04-16 PROCEDURE — 72120 XR LUMBAR SPINE AP AND LAT WITH FLEX/EXT: ICD-10-PCS | Mod: 26,,, | Performed by: RADIOLOGY

## 2019-04-16 PROCEDURE — 99214 PR OFFICE/OUTPT VISIT, EST, LEVL IV, 30-39 MIN: ICD-10-PCS | Mod: S$GLB,,, | Performed by: PHYSICIAN ASSISTANT

## 2019-04-16 PROCEDURE — 72120 X-RAY BEND ONLY L-S SPINE: CPT | Mod: TC,FY

## 2019-04-16 RX ORDER — DICLOFENAC SODIUM 75 MG/1
75 TABLET, DELAYED RELEASE ORAL 2 TIMES DAILY PRN
Qty: 60 TABLET | Refills: 2 | Status: SHIPPED | OUTPATIENT
Start: 2019-04-16 | End: 2019-08-21 | Stop reason: SDUPTHER

## 2019-04-16 RX ORDER — GABAPENTIN 300 MG/1
CAPSULE ORAL
Qty: 90 CAPSULE | Refills: 2 | Status: SHIPPED | OUTPATIENT
Start: 2019-04-16 | End: 2019-08-21 | Stop reason: SDUPTHER

## 2019-04-16 NOTE — PROGRESS NOTES
Subjective:     Patient ID:  Sarkis Saez Jr. is a 44 y.o. male.    [facility]    Chief Complaint:  Right low back and leg pain    HPI    Sarkis Saez Jr. is a 44 y.o. male who presents with the above CC.  Patient has a history of back and right leg pain and saw NS in the past in 2016.  Pain went away over time.  A few weeks ago he bent over to  a shoe and twisted the wrong way and started to have back and right leg pain that got progressively worse.  He went to urgent care and got a toradol shot and medrol pack that helped some.  Overall the pain has improved some recently.      Pain in the right low back and buttock region with radiation down the right lateral leg to the ankle.  Pain comes and goes and is worse with sitting and laying and better with walking and standing.  The worse is sitting driving in a car and also in the morning.  No left leg pain.    Patient has not had PT recently and had it in the past without much relief.  No ESIs.  No spine surgery.  Patient is currently taking ibuprofen PRN and lidocaine patches.    No neck pain or arm pain or paresthesias.    Patient denies any recent accidents or trauma, no saddle anesthesias, and no bowel or bladder incontinence.    Patient denies any difficulty with balance or gait, no difficulty tying shoes or buttoning clothes, is not dropping things, does not have difficulty opening containers, and has had no change in handwriting.      Review of Systems:    Review of Systems   Constitutional: Negative for chills, diaphoresis, fever, malaise/fatigue and weight loss.   HENT: Negative for congestion, ear discharge, ear pain, hearing loss, nosebleeds, sinus pain, sore throat and tinnitus.    Eyes: Negative for blurred vision, double vision, photophobia, pain, discharge and redness.   Respiratory: Negative for cough, hemoptysis, sputum production, shortness of breath, wheezing and stridor.    Cardiovascular: Negative for chest pain, palpitations,  orthopnea, leg swelling and PND.   Gastrointestinal: Negative for abdominal pain, blood in stool, constipation, diarrhea, heartburn, melena, nausea and vomiting.   Genitourinary: Negative for dysuria, flank pain, frequency, hematuria and urgency.   Musculoskeletal: Positive for back pain. Negative for falls, joint pain, myalgias and neck pain.   Skin: Negative for itching and rash.   Neurological: Negative for dizziness, tingling, tremors, sensory change, speech change, seizures, loss of consciousness, weakness and headaches.   Endo/Heme/Allergies: Negative for environmental allergies and polydipsia. Does not bruise/bleed easily.   Psychiatric/Behavioral: Negative for depression, hallucinations, memory loss and substance abuse. The patient is nervous/anxious. The patient does not have insomnia.          Past Medical History:   Diagnosis Date    Anxiety     Hypertension     Obesity      History reviewed. No pertinent surgical history.  Current Outpatient Medications on File Prior to Visit   Medication Sig Dispense Refill    amLODIPine (NORVASC) 10 MG tablet Take 1 tablet (10 mg total) by mouth once daily. 30 tablet 5    aspirin (ECOTRIN) 81 MG EC tablet Take 81 mg by mouth once daily.      clonazepam (KLONOPIN) 0.5 MG tablet Take 0.5 mg by mouth once daily.       DULoxetine (CYMBALTA) 60 MG capsule Take 90 mg by mouth once daily.  1    irbesartan (AVAPRO) 150 MG tablet Take 2 tablets (300 mg total) by mouth every evening. 180 tablet 3    multivitamin capsule Take 1 capsule by mouth once daily.      methylPREDNISolone (MEDROL DOSEPACK) 4 mg tablet Take 1 tablet (4 mg total) by mouth once daily. use as directed 1 Package 0     No current facility-administered medications on file prior to visit.      Review of patient's allergies indicates:  No Known Allergies  Social History     Socioeconomic History    Marital status:      Spouse name: Not on file    Number of children: Not on file    Years of  education: Not on file    Highest education level: Not on file   Occupational History    Not on file   Social Needs    Financial resource strain: Not on file    Food insecurity:     Worry: Not on file     Inability: Not on file    Transportation needs:     Medical: Not on file     Non-medical: Not on file   Tobacco Use    Smoking status: Former Smoker     Types: Cigars     Last attempt to quit: 2014     Years since quittin.3    Smokeless tobacco: Never Used   Substance and Sexual Activity    Alcohol use: Yes    Drug use: Not on file    Sexual activity: Not on file   Lifestyle    Physical activity:     Days per week: Not on file     Minutes per session: Not on file    Stress: Not on file   Relationships    Social connections:     Talks on phone: Not on file     Gets together: Not on file     Attends Restorationism service: Not on file     Active member of club or organization: Not on file     Attends meetings of clubs or organizations: Not on file     Relationship status: Not on file   Other Topics Concern    Not on file   Social History Narrative    Not on file     Family History   Problem Relation Age of Onset    Diabetes Father     Hypertension Father     Heart disease Maternal Grandfather     Heart disease Paternal Grandfather        Objective:      Vitals:    19 1443   BP: (!) 158/85   Pulse: 94   Temp: 99.5 °F (37.5 °C)   TempSrc: Oral   Weight: (!) 145.2 kg (320 lb)   Height: 6' (1.829 m)   PainSc:   3   PainLoc: Back         Physical Exam:    General:  Sarkis Saez Jr. is well-developed, well-nourished, appears stated age, in no acute distress, alert and oriented to person, place, and time.    Pulmonary/Chest:  Respiratory effort normal  Abdominal: Exhibits no distension  Psychiatric:  Normal mood and affect.  Behavior is normal.  Judgement and thought content normal    Musculoskeletal:    Patient arises from a sitting to standing position without difficulty.  Patient walks  to the door without evidence of limp, pain, or abnormality of gait. Patient is able to walk toes without difficulty.  He has difficulty walking on his heels on the right side.  Patient is able to walk on heels and toes without difficulty.      Lumbar ROM:   No pain in lumbar flexion, extension, right lateral bending, and left lateral bending.    Lumbar Spine Inspection:  Normal with no surgical scars and no visible rashes.    Lumbar Spine Palpation:  No tenderness to low back palpation.    SI Joint Palpation:  No tenderness to SI Joint palpation.    Straight Leg Raise:  Negative right and left SLR.    Neurological:  Alert and oriented to person, place, and time    Muscle strength against resistance:     Right Left   Deltoid  5 / 5 5 / 5   Biceps 5 / 5 5 / 5   Triceps 5 / 5 5 / 5   Wrist flexion  5 / 5 5 / 5   Wrist extension 5 / 5 5 / 5   Finger abduction 5 / 5 5 / 5   Thumb opposition 5 / 5 5 / 5   Handgrip 5 / 5 5 / 5   Hip flexion  5 / 5 5 / 5   Hip extension 5 / 5 5 / 5   Hip abduction 5 / 5 5 / 5   Hip adduction 5/ 5 5 / 5   Knee extension  5 / 5 5 / 5   Knee flexion  5 / 5 5 / 5   Dorsiflexion  5 / 5 5 / 5   EHL  5 / 5 5 / 5   Plantar flexion  5 / 5 5 / 5   Inversion of the feet 5 / 5 5 / 5   Eversion of the feet 5 / 5 5 / 5     Reflexes:     Right Left   Triceps 3+ 3+   Biceps 3+ 3+   Brachioradialis 3+ 3+   Patellar 3+ 3+   Achilles 3+ 3+     Babinski: Negative bilaterally  Clonus:  Positive two beats bilaterally  Kirby: Positive bilaterally    On gross examination of the bilateral upper and lower extremities, patient has no signs of clubbing, cyanosis, or edema.     XRAY Interpretation:     Lumbar spine ap/lateral/flexion/extension xrays were personally reviewed today.  No fractures.  No movement on flexion and extension.  DDD at L5-S1.    Assessment:          1. Acute right-sided low back pain with right-sided sciatica    2. DDD (degenerative disc disease), lumbar    3. Other spondylosis with  radiculopathy, lumbar region    4. Lumbar radiculopathy            Plan:          Orders Placed This Encounter    Ambulatory Referral to Physical/Occupational Therapy    gabapentin (NEURONTIN) 300 MG capsule    diclofenac (VOLTAREN) 75 MG EC tablet       Patient with L5-S1 ddd.  History of L4-5 HNP in 2016 now with similar symptoms.  Patient describing a right L5 radiculopathy    [facility]  -[place] Ibuprofen  -Diclofenac PRN with food  -Neurontin now.  Try just taking it at night first and increase as needed  -FU in two months.  If symptoms get worse would consider getting updated MRI lumbar spine    Follow-Up:  Follow up in about 2 months (around 6/16/2019). If there are any questions prior to this, the patient was instructed to contact the office.       JABARI Wiggins, PA-C  Neurosurgery  Back and Spine Center  [facility]

## 2019-04-16 NOTE — LETTER
April 16, 2019      Dave Hansen MD  [address]  [facility] [address]           [address]  [address], [address]  [facility] [address]  Phone: [phone]  Fax: [phone]          Patient: Sarkis Saez Jr.   MR Number: [id number]   YOB: 1975   Date of Visit: 4/16/2019       Dear Dr. Dave Hansen:    Thank you for referring Sarkis Saez to me for evaluation. Attached you will find relevant portions of my assessment and plan of care.    If you have questions, please do not hesitate to call me. I look forward to following Sarkis Saez along with you.    Sincerely,    CANDACE Wiggins  CC:  No Recipients    If you would like to receive this communication electronically, please contact [email or web address] or [phone] to request more information on [employer] Link access.    For providers and/or their staff who would like to refer a patient to [facility], please contact us through our one-stop-shop provider referral line, [facility], at [phone].    If you feel you have received this communication in error or would no longer like to receive these types of communications, please e-mail [email or web address]

## 2019-04-30 ENCOUNTER — PATIENT OUTREACH (OUTPATIENT)
Dept: OTHER | Facility: OTHER | Age: 44
End: 2019-04-30

## 2019-04-30 ENCOUNTER — PATIENT MESSAGE (OUTPATIENT)
Dept: INTERNAL MEDICINE | Facility: CLINIC | Age: 44
End: 2019-04-30

## 2019-04-30 ENCOUNTER — HOSPITAL ENCOUNTER (OUTPATIENT)
Dept: RADIOLOGY | Facility: HOSPITAL | Age: 44
Discharge: HOME | End: 2019-04-30
Attending: FAMILY MEDICINE
Payer: COMMERCIAL

## 2019-04-30 ENCOUNTER — OFFICE VISIT (OUTPATIENT)
Dept: INTERNAL MEDICINE | Facility: CLINIC | Age: 44
End: 2019-04-30
Attending: FAMILY MEDICINE
Payer: COMMERCIAL

## 2019-04-30 ENCOUNTER — TELEPHONE (OUTPATIENT)
Dept: INTERNAL MEDICINE | Facility: CLINIC | Age: 44
End: 2019-04-30

## 2019-04-30 ENCOUNTER — LAB VISIT (OUTPATIENT)
Dept: LAB | Facility: HOSPITAL | Age: 44
End: 2019-04-30
Attending: FAMILY MEDICINE
Payer: COMMERCIAL

## 2019-04-30 VITALS
TEMPERATURE: 99 F | BODY MASS INDEX: 42.66 KG/M2 | SYSTOLIC BLOOD PRESSURE: 128 MMHG | WEIGHT: 315 LBS | HEART RATE: 94 BPM | DIASTOLIC BLOOD PRESSURE: 78 MMHG | HEIGHT: 72 IN | OXYGEN SATURATION: 95 %

## 2019-04-30 DIAGNOSIS — R61 NIGHT SWEATS: Primary | ICD-10-CM

## 2019-04-30 DIAGNOSIS — K76.0 FATTY LIVER: ICD-10-CM

## 2019-04-30 DIAGNOSIS — I10 HYPERTENSION, ESSENTIAL: ICD-10-CM

## 2019-04-30 DIAGNOSIS — R61 NIGHT SWEATS: ICD-10-CM

## 2019-04-30 DIAGNOSIS — F41.9 ANXIETY: ICD-10-CM

## 2019-04-30 DIAGNOSIS — G47.9 SLEEP DISTURBANCE: ICD-10-CM

## 2019-04-30 LAB
BILIRUB UR QL STRIP: NEGATIVE
CLARITY UR REFRACT.AUTO: CLEAR
COLOR UR AUTO: YELLOW
GLUCOSE UR QL STRIP: NEGATIVE
HGB UR QL STRIP: NEGATIVE
KETONES UR QL STRIP: NEGATIVE
LEUKOCYTE ESTERASE UR QL STRIP: NEGATIVE
MICROSCOPIC COMMENT: NORMAL
NITRITE UR QL STRIP: NEGATIVE
PH UR STRIP: 8 [PH] (ref 5–8)
PROT UR QL STRIP: NEGATIVE
SP GR UR STRIP: 1.02 (ref 1–1.03)
URN SPEC COLLECT METH UR: NORMAL

## 2019-04-30 PROCEDURE — 3008F PR BODY MASS INDEX (BMI) DOCUMENTED: ICD-10-PCS | Mod: CPTII,S$GLB,, | Performed by: FAMILY MEDICINE

## 2019-04-30 PROCEDURE — 99214 PR OFFICE/OUTPT VISIT, EST, LEVL IV, 30-39 MIN: ICD-10-PCS | Mod: S$GLB,,, | Performed by: FAMILY MEDICINE

## 2019-04-30 PROCEDURE — 3078F PR MOST RECENT DIASTOLIC BLOOD PRESSURE < 80 MM HG: ICD-10-PCS | Mod: CPTII,S$GLB,, | Performed by: FAMILY MEDICINE

## 2019-04-30 PROCEDURE — 99999 PR PBB SHADOW E&M-EST. PATIENT-LVL IV: ICD-10-PCS | Mod: PBBFAC,,, | Performed by: FAMILY MEDICINE

## 2019-04-30 PROCEDURE — 71046 X-RAY EXAM CHEST 2 VIEWS: CPT | Mod: 26,,, | Performed by: RADIOLOGY

## 2019-04-30 PROCEDURE — 71046 X-RAY EXAM CHEST 2 VIEWS: CPT | Mod: TC

## 2019-04-30 PROCEDURE — 71046 XR CHEST PA AND LATERAL: ICD-10-PCS | Mod: 26,,, | Performed by: RADIOLOGY

## 2019-04-30 PROCEDURE — 81001 URINALYSIS AUTO W/SCOPE: CPT

## 2019-04-30 PROCEDURE — 3078F DIAST BP <80 MM HG: CPT | Mod: CPTII,S$GLB,, | Performed by: FAMILY MEDICINE

## 2019-04-30 PROCEDURE — 3074F SYST BP LT 130 MM HG: CPT | Mod: CPTII,S$GLB,, | Performed by: FAMILY MEDICINE

## 2019-04-30 PROCEDURE — 86480 TB TEST CELL IMMUN MEASURE: CPT

## 2019-04-30 PROCEDURE — 3074F PR MOST RECENT SYSTOLIC BLOOD PRESSURE < 130 MM HG: ICD-10-PCS | Mod: CPTII,S$GLB,, | Performed by: FAMILY MEDICINE

## 2019-04-30 PROCEDURE — 3008F BODY MASS INDEX DOCD: CPT | Mod: CPTII,S$GLB,, | Performed by: FAMILY MEDICINE

## 2019-04-30 PROCEDURE — 99999 PR PBB SHADOW E&M-EST. PATIENT-LVL IV: CPT | Mod: PBBFAC,,, | Performed by: FAMILY MEDICINE

## 2019-04-30 PROCEDURE — 99214 OFFICE O/P EST MOD 30 MIN: CPT | Mod: S$GLB,,, | Performed by: FAMILY MEDICINE

## 2019-04-30 RX ORDER — AMLODIPINE BESYLATE 10 MG/1
10 TABLET ORAL DAILY
Qty: 30 TABLET | Refills: 11 | Status: SHIPPED | OUTPATIENT
Start: 2019-04-30 | End: 2020-04-02 | Stop reason: SDUPTHER

## 2019-04-30 RX ORDER — IRBESARTAN 300 MG/1
TABLET ORAL
Qty: 30 TABLET | Refills: 11 | Status: SHIPPED | OUTPATIENT
Start: 2019-04-30 | End: 2019-06-26

## 2019-04-30 RX ORDER — CLONAZEPAM 0.5 MG/1
0.25 TABLET ORAL DAILY
COMMUNITY
End: 2024-01-19 | Stop reason: SDUPTHER

## 2019-04-30 RX ORDER — DULOXETIN HYDROCHLORIDE 30 MG/1
30 CAPSULE, DELAYED RELEASE ORAL DAILY
Refills: 5 | COMMUNITY
Start: 2019-04-16 | End: 2024-01-19 | Stop reason: SDUPTHER

## 2019-04-30 RX ORDER — IRBESARTAN 300 MG/1
TABLET ORAL
Qty: 30 TABLET | Refills: 11 | Status: SHIPPED | OUTPATIENT
Start: 2019-04-30 | End: 2019-04-30 | Stop reason: SDUPTHER

## 2019-04-30 NOTE — TELEPHONE ENCOUNTER
----- Message from Tamy Watson sent at 4/30/2019  3:21 PM CDT -----  Contact: self/[phone]  Pt is calling stating that he has questions about test results he received back today. Pt states that he would like to receive a call back from the doctor himself. Please call and advise.        Thank You

## 2019-04-30 NOTE — PROGRESS NOTES
Last 5 Patient Entered Readings                                      Current 30 Day Average: 154/85     Recent Readings 4/22/2019 4/22/2019 4/22/2019 4/10/2019 4/8/2019    SBP (mmHg) 138 141 154 141 172    DBP (mmHg) 76 72 76 70 90    Pulse 72 70 75 72 80        Hypertension Medications     amLODIPine (NORVASC) 10 MG tablet Take 1 tablet (10 mg total) by mouth once daily.    irbesartan (AVAPRO) 300 MG tablet Take 1 tablet (300 mg total) by mouth every evening.     Patient has appointment with PCP today. Will push back outreach.       Amanda Linder, Pharm.D.   Digital Medicine Clinical Pharmacist  [phone]

## 2019-04-30 NOTE — PROGRESS NOTES
Subjective:       Patient ID: Sarkis Saez Jr. is a 44 y.o. male.    Chief Complaint: Advice Only and Excessive Sweating    HPI  Review of Systems   Constitutional: Positive for diaphoresis. Negative for activity change, chills, fatigue, fever and unexpected weight change.   HENT: Negative for congestion, hearing loss, rhinorrhea and trouble swallowing.    Eyes: Negative for discharge, redness and visual disturbance.   Respiratory: Negative for cough, chest tightness, shortness of breath and wheezing.    Cardiovascular: Negative for chest pain, palpitations and leg swelling.   Gastrointestinal: Negative for abdominal pain, blood in stool, constipation, diarrhea and vomiting.   Endocrine: Negative for polydipsia and polyuria.   Genitourinary: Negative for difficulty urinating, hematuria and urgency.   Musculoskeletal: Negative for arthralgias, back pain, gait problem, joint swelling, myalgias and neck pain.   Skin: Negative for color change and rash.   Neurological: Negative for tremors, speech difficulty, weakness, numbness and headaches.   Hematological: Negative for adenopathy. Does not bruise/bleed easily.   Psychiatric/Behavioral: Positive for sleep disturbance. Negative for behavioral problems, confusion and dysphoric mood. The patient is nervous/anxious.        Objective:      Physical Exam   Constitutional: He is oriented to person, place, and time. He appears well-developed and well-nourished. No distress.   HENT:   Head: Normocephalic.   Right Ear: Tympanic membrane, external ear and ear canal normal.   Left Ear: Tympanic membrane, external ear and ear canal normal.   Mouth/Throat: Oropharynx is clear and moist.   Eyes: Pupils are equal, round, and reactive to light.   Neck: Normal range of motion. Neck supple. Carotid bruit is not present. No thyromegaly present.   Cardiovascular: Normal rate, regular rhythm, normal heart sounds and intact distal pulses. Exam reveals no gallop and no friction rub.    No murmur heard.  Pulmonary/Chest: Effort normal and breath sounds normal.   Abdominal: Soft. He exhibits no distension and no mass. There is no tenderness. There is no rebound and no guarding.   Genitourinary: Right testis shows no mass. Left testis shows no mass.   Musculoskeletal: Normal range of motion. He exhibits no edema or tenderness.        Right lower leg: He exhibits no edema.        Left lower leg: He exhibits no edema.   Lymphadenopathy:     He has no cervical adenopathy. No inguinal adenopathy noted on the right or left side.   Neurological: He is alert and oriented to person, place, and time. He has normal strength. He displays normal reflexes. No cranial nerve deficit or sensory deficit. Coordination and gait normal.   Skin: Skin is warm and dry. No rash noted.   Psychiatric: He has a normal mood and affect. His behavior is normal. Judgment and thought content normal.   Nursing note and vitals reviewed.      Assessment:       1. Night sweats    2. Hypertension, essential    3. Anxiety    4. Fatty liver    5. Sleep disturbance        Plan:   Sarkis was seen today for advice only and excessive sweating.    Diagnoses and all orders for this visit:    Night sweats  -     X-Ray Chest PA And Lateral; Future  -     CBC Without Differential; Future  -     TSH; Future  -     Comprehensive metabolic panel; Future  -     Urinalysis  -     Urinalysis Microscopic  -     Quantiferon Gold TB; Future    Hypertension, essential  -     amLODIPine (NORVASC) 10 MG tablet; Take 1 tablet (10 mg total) by mouth once daily.    Anxiety    Fatty liver    Sleep disturbance  -     Ambulatory referral to Sleep Disorders    Other orders  -     Discontinue: irbesartan (AVAPRO) 300 MG tablet; Take 1 tablet (300 mg total) by mouth every evening.  -     irbesartan (AVAPRO) 300 MG tablet; Take 1 tablet (300 mg total) by mouth every evening.      See meds, orders, follow up, routing and instructions sections of encounter.  A  44-year-old established male patient.  He is in for night sweats, which occur   every third night or so.  He googled this and has some concerns about the many   conditions that cause night sweats.  He has no other constitutional complaints   such as cough, weight loss, abdominal pain, chest pain or dyspnea.  He does have   a known anxiety state.  He is under the care of psychiatrist.  His medication   is reviewed and dosing updated.    He has had recent laboratory that was normal.  We will check a chest x-ray,   QuantiFERON test, CBC and CMP with TSH.    He did discuss with his psychiatrist discontinuation of Klonopin, it was stopped   at some point in the near past, did not tolerate this and went back on it.  At   this time, it is unclear whether this is contributing to his night sweats   situation.  He does describe this as starting on January 17th specifically.  It   is not improved over the last couple of months.    Reassurance.  Follow up in three months.  See laboratory and explained that we   do not have full capability of ruling out all disease states, but can consider   an Endocrinology or other consult if his symptoms worsen or become more   concerned.      MARCO A/CARMEN  dd: 04/30/2019 12:15:41 (CDT)  td: 05/01/2019 08:41:22 (CDT)  Doc ID   #[id number]  Job ID #[id number]    CC:

## 2019-05-01 ENCOUNTER — PATIENT MESSAGE (OUTPATIENT)
Dept: INTERNAL MEDICINE | Facility: CLINIC | Age: 44
End: 2019-05-01

## 2019-05-02 ENCOUNTER — OFFICE VISIT (OUTPATIENT)
Dept: SLEEP MEDICINE | Facility: CLINIC | Age: 44
End: 2019-05-02
Payer: COMMERCIAL

## 2019-05-02 VITALS
HEIGHT: 72 IN | DIASTOLIC BLOOD PRESSURE: 82 MMHG | WEIGHT: 315 LBS | SYSTOLIC BLOOD PRESSURE: 120 MMHG | BODY MASS INDEX: 42.66 KG/M2

## 2019-05-02 DIAGNOSIS — G47.30 SLEEP APNEA, UNSPECIFIED TYPE: Primary | ICD-10-CM

## 2019-05-02 DIAGNOSIS — E66.2 OBESITY HYPOVENTILATION SYNDROME: ICD-10-CM

## 2019-05-02 LAB
M TB IFN-G CD4+ BCKGRND COR BLD-ACNC: -0.12 IU/ML
MITOGEN IGNF BCKGRD COR BLD-ACNC: 5.69 IU/ML
MITOGEN IGNF BCKGRD COR BLD-ACNC: NEGATIVE [IU]/ML
NIL: 0.16 IU/ML
TB2 - NIL: -0.12 IU/ML

## 2019-05-02 PROCEDURE — 3074F PR MOST RECENT SYSTOLIC BLOOD PRESSURE < 130 MM HG: ICD-10-PCS | Mod: CPTII,S$GLB,, | Performed by: INTERNAL MEDICINE

## 2019-05-02 PROCEDURE — 99214 PR OFFICE/OUTPT VISIT, EST, LEVL IV, 30-39 MIN: ICD-10-PCS | Mod: S$GLB,,, | Performed by: INTERNAL MEDICINE

## 2019-05-02 PROCEDURE — 3079F PR MOST RECENT DIASTOLIC BLOOD PRESSURE 80-89 MM HG: ICD-10-PCS | Mod: CPTII,S$GLB,, | Performed by: INTERNAL MEDICINE

## 2019-05-02 PROCEDURE — 99999 PR PBB SHADOW E&M-EST. PATIENT-LVL III: CPT | Mod: PBBFAC,,, | Performed by: INTERNAL MEDICINE

## 2019-05-02 PROCEDURE — 99214 OFFICE O/P EST MOD 30 MIN: CPT | Mod: S$GLB,,, | Performed by: INTERNAL MEDICINE

## 2019-05-02 PROCEDURE — 3074F SYST BP LT 130 MM HG: CPT | Mod: CPTII,S$GLB,, | Performed by: INTERNAL MEDICINE

## 2019-05-02 PROCEDURE — 3008F BODY MASS INDEX DOCD: CPT | Mod: CPTII,S$GLB,, | Performed by: INTERNAL MEDICINE

## 2019-05-02 PROCEDURE — 3079F DIAST BP 80-89 MM HG: CPT | Mod: CPTII,S$GLB,, | Performed by: INTERNAL MEDICINE

## 2019-05-02 PROCEDURE — 99999 PR PBB SHADOW E&M-EST. PATIENT-LVL III: ICD-10-PCS | Mod: PBBFAC,,, | Performed by: INTERNAL MEDICINE

## 2019-05-02 PROCEDURE — 3008F PR BODY MASS INDEX (BMI) DOCUMENTED: ICD-10-PCS | Mod: CPTII,S$GLB,, | Performed by: INTERNAL MEDICINE

## 2019-05-02 NOTE — PATIENT INSTRUCTIONS
Sleep Hygiene Practices    1. Try going to bed only when you are drowsy.    2. If you are unable to fall asleep or stay asleep, leave your bedroom and engage in a quiet activity elsewhere. Do not permit yourself to fall asleep outside the bedroom. Return to bed when and only when you are sleepy. Repeat this process as often as necessary throughout night.    3. Maintain regular wake-up time, even on days off work & weekends    4. Use your bedroom for sleep and sex    5. Avoid napping during the daytime. If daytime sleepiness becomes overwhelming, limit nap time to a single nap of less than 1hr, no later than 3pm.    6. Distract your mind. Avoid clock watching. Lying in bed unable to sleep and frustrated needs to be avoided. Try reading or watching a videotape or listening to books on tape. It may be necessary to go into another room to do these.    7. Avoid caffeine within 4-6hrs of bedtime    8. Avoid use of nicotine close to bedtime    9. do not drink alcoholic beverages within 4-6hrs of bedtime    10. While a light snack before bedtime can help promote sound sleep, avoid large meals.    11. Obtain regular exercise, but avoid strenuous exercise within 4hrs of bedtime    12. Minimize light, noise, and extremes in temperature in the bedroom.    13. Precautions: The patient was advised to abstain from driving should they feel sleepy or drowsy.  ?    * This information is provided had been directly obtained from the American Academy of [employer] wellness booklet on sleep hygiene. ([facility], [address] [place] [address]

## 2019-05-02 NOTE — LETTER
May 2, 2019      Dave Hansen MD  [address]  [facility] [address]           [facility] [place] [address] [facility] [address]  [address] [place]  [facility] [address]  Phone: [phone]          Patient: Sarkis Saez Jr.   MR Number: [id number]   YOB: 1975   Date of Visit: 5/2/2019       Dear Dr. Dave Hansen:    Thank you for referring Sarkis Saez to me for evaluation. Attached you will find relevant portions of my assessment and plan of care.    If you have questions, please do not hesitate to call me. I look forward to following Sarkis Saez along with you.    Sincerely,    Esteban uDnbar MD    Enclosure  CC:  No Recipients    If you would like to receive this communication electronically, please contact [email or web address] or [phone] to request more information on [employer] access.    For providers and/or their staff who would like to refer a patient to [facility], please contact us through our one-stop-shop provider referral line, [facility], at [phone].    If you feel you have received this communication in error or would no longer like to receive these types of communications, please e-mail [email or web address]

## 2019-05-02 NOTE — PROGRESS NOTES
This 44 y.o. male patient presents for the evaluation of possible obstructive sleep apnea.    Referring Provider: Dr. Dave Hansen     Pt is referred for evaluation of LEE by PCP.  Pt has been fatigued and tiredness  On and off , unclear about  witnessed apneas, wakes 1-2  times unknown reason/nocturia, takes a min's to go  back to sleep, negative for feeling  tired on waking up ,   2  cups of coffee a day  2 cups of ice tea 64 ounce , - urge to move legs/  leg kicking. + on and off nasal congestion. - cataplexy,-  hallucination,- sleep paralysis. - acting out dreams. - vivid dream.  Pt prefer to sleep on the side/stomach.       Pt denies not having head injury and hospitalization due to viral infection. - Sleep walking/talking. Memory is good. Pt feels anxious content.  Denies any sleep related injury. Night mare rarely.Pt does not  have  morning headache.  Pt does feel  Drowsy while driving when driving long distance. Pt is mouth/nose breather. Pt does not wake up to eat at night. Pt does sweat at night.  Pt does not grind teeth.       EPWORTH SLEEPINESS SCALE 4/30/2019   Sitting and reading 1   Watching TV 1   Sitting, inactive in a public place (e.g. a theatre or a meeting) 0   As a passenger in a car for an hour without a break 1   Lying down to rest in the afternoon when circumstances permit 3   Sitting and talking to someone 0   Sitting quietly after a lunch without alcohol 0   In a car, while stopped for a few minutes in traffic 0   Total score 6     Sleep Clinic New Patient 4/30/2019   What time do you go to bed on a week day? (Give a range) 11:00pm - 12:00am   What time do you go to bed on a day off? (Give a range) 11:00pm - 12:00am   How long does it take you to fall asleep? (Give a range) 0 - 5 minutes   On average, how many times per night do you wake up? 2   How long does it take you to fall back into sleep? (Give a range) 0 - 5 minutes   What time do you wake up to start your day on a week day?  (Give a range) 7:00am - 7:30am   What time do you wake up to start your day on a day off? (Give a range) 9:00am - 10:00am   What time do you get out of bed? (Give a range) 7:30am - 7:45am on work days, 9:00am - 10am on weekends   On average, how many hours do you sleep? 6   On average, how many naps do you take per day? 0   Rate your sleep quality from 0 to 5 (0-poor, 5-great). 3   Have you experienced:  Weight gain?   How much weight have you lost or gained (in lbs.) in the last year? 15lbs   On average, how many times per night do you go to the bathroom?  2   Have you ever had a sleep study/CPAP machine/surgery for sleep apnea? No   Have you ever had a CPAP machine for sleep apnea? No   Have you ever had surgery for sleep apnea? No     Sleep Clinic ROS  4/30/2019   Difficulty breathing through the nose?  No   Sore throat or dry mouth in the morning? Yes   Irregular or very fast heart beat?  No   Shortness of breath?  No   Acid reflux? No   Body aches and pains?  Sometimes   Morning headaches? No   Dizziness? No   Mood changes?  Yes   Do you exercise?  Sometimes   Do you feel like moving your legs a lot?  No     MIKE-7 Score: (P) 21  Interpretation: (P) Severe Anxiety      PHQ9 4/30/2019   Little interest or pleasure in doing things: Several days   Feeling down, depressed or hopeless: Nearly every day   Trouble falling asleep, staying asleep, or sleeping too much: More than half the days   Feeling tired or having little energy: Several days   Poor appetite or overeating: Not at all   Feeling bad about yourself- or that you are a failure or have let yourself or family down Nearly every day   Trouble concentrating on things, such as reading the newspaper or watching television: Not at all   Moving or speaking so slowly that other people could have noticed. Or the opposite- being so fidgety or restless that you have been moving around a lot more than usual: Not at all   Thoughts that you would be better off dead or  hurting yourself in some way: Not at all   If you indicated you have experienced any of the aforementioned problems, how difficult have these problems made it for you to do your work, take care of things at home or get along with other people? Somewhat difficult   Total Score 10       SLEEP ROUTINE:  Bed partner: yes  Daytime naps: on weekends hr-2 and feels good.     Past Medical History:   Diagnosis Date    Anxiety     Hypertension     Obesity        No past surgical history on file.    Family History   Problem Relation Age of Onset    Diabetes Father     Hypertension Father     Heart disease Maternal Grandfather     Heart disease Paternal Grandfather        Social History     Tobacco Use    Smoking status: Former Smoker     Types: Cigars     Last attempt to quit: 2014     Years since quittin.3    Smokeless tobacco: Never Used   Substance Use Topics    Alcohol use: Yes     Frequency: 2-4 times a month     Drinks per session: 1 or 2     Binge frequency: Never    Drug use: Not on file       ALLERGIES: Reviewed in EPIC    CURRENT MEDICATIONS: Reviewed in EPIC    REVIEW OF SYSTEMS:  Sleep related symptoms as per HPI;       PHYSICAL EXAM:  /82   Ht 6' (1.829 m)   Wt (!) 151 kg (332 lb 14.3 oz)   BMI 45.15 kg/m²   GENERAL: Well groomed; Normally developed;  HEENT: Conjunctivae are non-erythematous; Pupils equal, round, and reactive to light;    Nose is symmetrical; Nasal mucosa is pink and moist; Septum is midline;    Turbinates are normal; Nasal airflow is normal;    Posterior pharynx is pink; Posterior palate is normal; Modified Mallampati: IV   Uvula is normal; Tongue is normal; Tonsils +1;    Dentition is fair; No TMJ tenderness;    Jaw opening and protrusion without click and without discomfort.  NECK: Supple. No thyromegaly. No palpable nodes. Neck circumference in inches is 22  SKIN: On face and neck: No abrasions, no rashes, no lesions.     No subcutaneous nodules are  palpable.  RESPIRATORY: Chest is clear to auscultation.     Normal chest expansion and non-labored breathing at rest.  CARDIOVASCULAR: Normal S1, S2.  No murmurs, gallops or rubs.   No carotid bruits bilaterally.  EXTREMITIES: No clubbing. No cyanosis. Edema is absent.   NEURO: Oriented to time, place and person.    Normal attention span and concentration.  Station normal. Gait normal.  PSYCH: Affect is full. Mood is normal.    ASSESSMENT:    1. Sleep Apnea, unspecified. The patient symptomatically has snoring  with exam findings of a crowded oral airway with medical co-morbidities of hypertension and obesity. This warrants further investigation for possible obstructive sleep apnea.    Sleep apnea, unspecified type  -     Polysomnogram (CPAP will be added if patient meets diagnostic criteria.); Future    Obesity hypoventilation syndrome  -     Polysomnogram (CPAP will be added if patient meets diagnostic criteria.); Future             PLAN:  Sleep apnea unspecified /obesity hypoventilation syndrome :  Discussed with the patient having risk of having LEE and in view of BMI 45 and bicarb 27 will do in lab sleep study with ETCO2 for obesity hypoventilation and LEE. Suggested to avoid supine sleep except for the test day and to loose weight and to avoid drowsy driving.  The nature of this procedure and its indication was discussed with the patient.    Education: During our discussion today, we talked about the etiology of obstructive sleep apnea as well as the potential ramifications of untreated sleep apnea, which could include daytime sleepiness, hypertension, heart disease and/or stroke.  We discussed potential treatment options, which could include weight loss, body positioning, use of an oral appliance, continuous positive airway pressure (CPAP), EPAP, or referral for surgical consideration.    Precautions: The patient was advised to abstain from driving should they feel sleepy or drowsy.    Follow up: 6 weeks with  bret Dunbar MD, FACP  Phone: [phone]  Fax: [phone]

## 2019-05-08 ENCOUNTER — PATIENT MESSAGE (OUTPATIENT)
Dept: ADMINISTRATIVE | Facility: OTHER | Age: 44
End: 2019-05-08

## 2019-05-13 ENCOUNTER — PATIENT MESSAGE (OUTPATIENT)
Dept: SLEEP MEDICINE | Facility: CLINIC | Age: 44
End: 2019-05-13

## 2019-05-13 ENCOUNTER — TELEPHONE (OUTPATIENT)
Dept: INTERNAL MEDICINE | Facility: CLINIC | Age: 44
End: 2019-05-13

## 2019-05-13 ENCOUNTER — PATIENT OUTREACH (OUTPATIENT)
Dept: OTHER | Facility: OTHER | Age: 44
End: 2019-05-13

## 2019-05-13 NOTE — PROGRESS NOTES
Last 5 Patient Entered Readings                                      Current 30 Day Average: 129/77     Recent Readings 5/2/2019 4/30/2019 4/22/2019 4/22/2019 4/22/2019    SBP (mmHg) 120 128 138 141 154    DBP (mmHg) 82 78 76 72 76    Pulse 70 90 72 70 75        Digital Medicine: Health  Follow Up    Patient reports he will be having a sleep study in a couple of weeks.   BP has trended downward.    Lifestyle Modifications:    1.Dietary Modifications (Sodium intake <2,000mg/day, food labels, dining out):   Deferred    2.Physical Activity:   Patient reports he has not been moving as much because of the pain he has been experiencing.  Patient reports he has been taking gabapentin and diclofenac for pain.     3.Medication Therapy:   Patient has been compliant with the medication regimen.    4.Patient has the following medication side effects/concerns: None  (Frequency/Alleviating factors/Precipitating factors, etc.)     Follow up with Mr. Sarkis Saez Jr. completed. No further questions or concerns. Will continue to follow up to achieve health goals.  Patient is currently at goal, 129/77 mmHg does not exceed <130/80 mmHg.

## 2019-05-27 NOTE — PROGRESS NOTES
Last 5 Patient Entered Readings                                      Current 30 Day Average: 128/78     Recent Readings 5/20/2019 5/20/2019 5/20/2019 5/20/2019 5/2/2019    SBP (mmHg) 137 142 141 139 120    DBP (mmHg) 76 82 73 78 82    Pulse 67 68 68 70 70          Called patient for BP follow up. No answer. Left message for call back      Amanda Linder, Pharm.D.   [employer] [facility] Clinical Pharmacist  [phone]

## 2019-06-03 ENCOUNTER — PATIENT MESSAGE (OUTPATIENT)
Dept: SLEEP MEDICINE | Facility: CLINIC | Age: 44
End: 2019-06-03

## 2019-06-06 DIAGNOSIS — G47.30 SLEEP APNEA, UNSPECIFIED TYPE: Primary | ICD-10-CM

## 2019-06-06 NOTE — PROGRESS NOTES
Did peer to peer review and discussed the criteria for the Obesity hypoventilation, as per the united guideline despite of inform them pt is high risk denied as per the united guideline for the in lab sleep study. Called patient and left a detailed message informing him about the discussion with [employer] Peer to peer request and they want arterial blood gas and will not take the bicarb level and BMI > 50 for inlab testing and will place the order for the home sleep study and will follow.        Thanks,          Esteban Dunbar MD, FACP  Phone: [phone]  Fax: [phone]

## 2019-06-14 ENCOUNTER — TELEPHONE (OUTPATIENT)
Dept: SLEEP MEDICINE | Facility: OTHER | Age: 44
End: 2019-06-14

## 2019-06-26 ENCOUNTER — TELEPHONE (OUTPATIENT)
Dept: INTERNAL MEDICINE | Facility: CLINIC | Age: 44
End: 2019-06-26

## 2019-06-26 RX ORDER — VALSARTAN 320 MG/1
320 TABLET ORAL DAILY
Qty: 90 TABLET | Refills: 3 | Status: SHIPPED | OUTPATIENT
Start: 2019-06-26 | End: 2020-06-03 | Stop reason: SDUPTHER

## 2019-06-26 NOTE — TELEPHONE ENCOUNTER
----- Message from Melly Villarreal sent at 6/26/2019  9:27 AM CDT -----  Regarding: Irbesartan  Good Morning,    Irbesartan is still on backorder with no release date.    Can you please send a new script for an alternative medication to [facility]?    Thanks,  Melly REILLY

## 2019-06-27 ENCOUNTER — TELEPHONE (OUTPATIENT)
Dept: SLEEP MEDICINE | Facility: OTHER | Age: 44
End: 2019-06-27

## 2019-07-02 ENCOUNTER — TELEPHONE (OUTPATIENT)
Dept: SLEEP MEDICINE | Facility: OTHER | Age: 44
End: 2019-07-02

## 2019-07-02 NOTE — TELEPHONE ENCOUNTER
Left messages to schedule the home sleep study,no response.  Sent out a message through my [employer] to schedule.

## 2019-07-08 ENCOUNTER — PATIENT OUTREACH (OUTPATIENT)
Dept: OTHER | Facility: OTHER | Age: 44
End: 2019-07-08

## 2019-07-08 NOTE — PROGRESS NOTES
Last 5 Patient Entered Readings                                      Current 30 Day Average: 125/74     Recent Readings 6/28/2019 6/28/2019 6/28/2019 6/28/2019 6/28/2019    SBP (mmHg) 131 129 137 138 137    DBP (mmHg) 72 73 79 71 73    Pulse 67 67 80 67 67          Digital Medicine: Health  Follow Up    7/8: Left voicemail to follow up with Mr. Sarkis Saez Jr..  Current BP average 125/74 mmHg is at goal, <130/80 mmHg.  No readings since 6/28, will send [employer] message.

## 2019-07-22 NOTE — PROGRESS NOTES
Last 5 Patient Entered Readings                                      Current 30 Day Average: 132/75     Recent Readings 7/9/2019 7/9/2019 7/9/2019 6/28/2019 6/28/2019    SBP (mmHg) 132 138 136 131 129    DBP (mmHg) 77 76 78 72 73    Pulse 71 72 70 67 67          Digital Medicine: Health  Follow Up    7/22: Left voicemail to follow up with Mr. Sarkis Saez Jr..  Current BP average 132/75 mmHg is not at goal, <130/80 mmHg.  BP is no longer at goal.  Patient has not been submitting regular readings.  Sending [employer] message.

## 2019-08-05 NOTE — PROGRESS NOTES
Last 5 Patient Entered Readings                                      Current 30 Day Average: 134/76     Recent Readings 7/31/2019 7/31/2019 7/31/2019 7/22/2019 7/22/2019    SBP (mmHg) 137 133 135 134 137    DBP (mmHg) 76 79 76 69 74    Pulse 64 65 65 69 71          Digital Medicine: Health  Follow Up    8/5: Left voicemail to follow up with Mr. Sarkis Saez Jr..  Current BP average 134/76 mmHg is not at goal, <130/80 mmHg.  BP is no longer at goal.

## 2019-08-19 NOTE — PROGRESS NOTES
Last 5 Patient Entered Readings                                      Current 30 Day Average: 136/77     Recent Readings 8/12/2019 8/12/2019 8/12/2019 8/12/2019 7/31/2019    SBP (mmHg) 136 141 137 148 137    DBP (mmHg) 80 77 79 81 76    Pulse 69 68 68 68 64          Digital Medicine: Health  Follow Up    8/19: Left voicemail to follow up with Mr. Sarkis Saez Jr..  Current BP average 136/77 mmHg is not at goal, <130/80 mmHg.  BP is no longer at goal.   Will defer NC protocol at next unsuccessful outreach.

## 2019-08-21 RX ORDER — GABAPENTIN 300 MG/1
300 CAPSULE ORAL 3 TIMES DAILY
Qty: 90 CAPSULE | Refills: 2 | Status: SHIPPED | OUTPATIENT
Start: 2019-08-21 | End: 2021-04-12 | Stop reason: SDUPTHER

## 2019-08-21 RX ORDER — DICLOFENAC SODIUM 75 MG/1
75 TABLET, DELAYED RELEASE ORAL 2 TIMES DAILY PRN
Qty: 60 TABLET | Refills: 2 | Status: SHIPPED | OUTPATIENT
Start: 2019-08-21 | End: 2021-04-25

## 2019-09-29 ENCOUNTER — OFFICE VISIT (OUTPATIENT)
Dept: URGENT CARE | Facility: CLINIC | Age: 44
End: 2019-09-29
Payer: COMMERCIAL

## 2019-09-29 VITALS
TEMPERATURE: 99 F | BODY MASS INDEX: 42.66 KG/M2 | WEIGHT: 315 LBS | HEIGHT: 72 IN | OXYGEN SATURATION: 97 % | HEART RATE: 77 BPM | SYSTOLIC BLOOD PRESSURE: 136 MMHG | DIASTOLIC BLOOD PRESSURE: 76 MMHG | RESPIRATION RATE: 19 BRPM

## 2019-09-29 DIAGNOSIS — S99.911A INJURY OF RIGHT ANKLE, INITIAL ENCOUNTER: Primary | ICD-10-CM

## 2019-09-29 DIAGNOSIS — S89.91XA INJURY OF RIGHT LOWER LEG, INITIAL ENCOUNTER: ICD-10-CM

## 2019-09-29 PROCEDURE — 3008F BODY MASS INDEX DOCD: CPT | Mod: CPTII,S$GLB,, | Performed by: NURSE PRACTITIONER

## 2019-09-29 PROCEDURE — 73610 XR ANKLE COMPLETE 3 VIEW RIGHT: ICD-10-PCS | Mod: FY,RT,S$GLB, | Performed by: RADIOLOGY

## 2019-09-29 PROCEDURE — 99214 OFFICE O/P EST MOD 30 MIN: CPT | Mod: S$GLB,,, | Performed by: NURSE PRACTITIONER

## 2019-09-29 PROCEDURE — 3078F DIAST BP <80 MM HG: CPT | Mod: CPTII,S$GLB,, | Performed by: NURSE PRACTITIONER

## 2019-09-29 PROCEDURE — 73610 X-RAY EXAM OF ANKLE: CPT | Mod: FY,RT,S$GLB, | Performed by: RADIOLOGY

## 2019-09-29 PROCEDURE — 3075F PR MOST RECENT SYSTOLIC BLOOD PRESS GE 130-139MM HG: ICD-10-PCS | Mod: CPTII,S$GLB,, | Performed by: NURSE PRACTITIONER

## 2019-09-29 PROCEDURE — 3008F PR BODY MASS INDEX (BMI) DOCUMENTED: ICD-10-PCS | Mod: CPTII,S$GLB,, | Performed by: NURSE PRACTITIONER

## 2019-09-29 PROCEDURE — 99214 PR OFFICE/OUTPT VISIT, EST, LEVL IV, 30-39 MIN: ICD-10-PCS | Mod: S$GLB,,, | Performed by: NURSE PRACTITIONER

## 2019-09-29 PROCEDURE — 3078F PR MOST RECENT DIASTOLIC BLOOD PRESSURE < 80 MM HG: ICD-10-PCS | Mod: CPTII,S$GLB,, | Performed by: NURSE PRACTITIONER

## 2019-09-29 PROCEDURE — 3075F SYST BP GE 130 - 139MM HG: CPT | Mod: CPTII,S$GLB,, | Performed by: NURSE PRACTITIONER

## 2019-09-29 NOTE — PATIENT INSTRUCTIONS
Understanding Ankle Sprain    The ankle is the joint where the leg and foot meet. Bones are held in place by connective tissue called ligaments. When ankle ligaments are stretched to the point of pain and injury, it is called an ankle sprain. A sprain can tear the ligaments. These tears can be very small but still cause pain. Ankle sprains can be mild or severe.  What causes an ankle sprain?  A sprain may occur when you twist your ankle or bend it too far. This can happen when you stumble or fall. Things that can make an ankle sprain more likely include:  · Having had an ankle sprain before  · Playing sports that involve running and jumping. Or playing contact sports such as football or hockey.  · Wearing shoes that dont support your feet and ankles well  · Having ankles with poor strength and flexibility  Symptoms of an ankle sprain  Symptoms may include:  · Pain or soreness in the ankle  · Swelling  · Redness or bruising  · Not being able to walk or put weight on the affected foot  · Reduced range of motion in the ankle  · A popping or tearing feeling at the time the sprain occurs  · An abnormal or dislocated look to the ankle  · Instability or too much range of motion in the ankle  Treatment for an ankle sprain  Treatment focuses on reducing pain and swelling, and avoiding further injury. Treatments may include:  · Resting the ankle. Avoid putting weight on it. This may mean using crutches until the sprain heals.  · Prescription or over-the-counter pain medicines. These help reduce swelling and pain.  · Cold packs. These help reduce pain and swelling.  · Raising your ankle above your heart. This helps reduce swelling.  · Wrapping the ankle with an elastic bandage or ankle brace. This helps reduce swelling and gives some support to the ankle. In rare cases, you may need a cast or boot.  · Stretching and other exercises. These improve flexibility and strength.  · Heat packs. These may be recommended before doing  ankle exercises.  Possible complications of an ankle sprain  An ankle that has been weakened by a sprain can be more likely to have repeated sprains afterward. Doing exercises to strengthen your ankle and improve balance can reduce your risk for repeated sprains. Other possible complications are long-term (chronic) pain or an ankle that remains unstable.  When to call your healthcare provider  Call your healthcare provider right away if you have any of these:  · Fever of 100.4°F (38°C) or higher, or as directed  · Pain, numbness, discoloration, or coldness in the foot or toes  · Pain that gets worse  · Symptoms that dont get better, or get worse  · New symptoms   Date Last Reviewed: 3/10/2016  © 5865-6657 [employer]. [address] [place] [address], [place] [address]. All rights reserved. This information is not intended as a substitute for professional medical care. Always follow your healthcare professional's instructions.    -Xray in the clinic today is negative for a fracture, soft tissue swelling present, concerns of a sprain.  -ice and compression.  -NSAIDs as needed for pain.  -Elevate the affected ankle.  Please follow up with your Primary care provider within 2-5 days if your signs and symptoms have not resolved or worsen.     If your condition worsens or fails to improve we recommend that you receive another evaluation at the emergency room immediately or contact your primary medical clinic to discuss your concerns.   You must understand that you have received an Urgent Care treatment only and that you may be released before all of your medical problems are known or treated. You, the patient, will arrange for follow up care as instructed.

## 2019-09-29 NOTE — PROGRESS NOTES
Subjective:       Patient ID: Sarkis Saez Jr. is a 44 y.o. male.    Vitals:  vitals were not taken for this visit.     Chief Complaint: Ankle Injury (right)    HPI  <OUCOOHADULT>    Objective:      Physical Exam    Assessment:       No diagnosis found.    Plan:         There are no diagnoses linked to this encounter.

## 2019-09-29 NOTE — PROGRESS NOTES
Subjective:       Patient ID: Sarkis Saez Jr. is a 44 y.o. male.    Vitals:  height is 6' (1.829 m) and weight is 150.6 kg (332 lb) (abnormal). His oral temperature is 98.9 °F (37.2 °C). His blood pressure is 136/76 and his pulse is 77. His respiration is 19 and oxygen saturation is 97%.     Chief Complaint: Ankle Injury (right)    Patient presents to clinic today with complaints of right ankle pain after injuring himself last night.  Patient reports he inverted his right ankle last night.  Patient has been taking NSAIDs as well as applying ice to the right lower leg and ankle with mild improvement.    Ankle Injury    The incident occurred 12 to 24 hours ago. The incident occurred at home. The injury mechanism was a twisting injury. The pain is present in the right ankle. The quality of the pain is described as aching. The pain is at a severity of 4/10. The pain is moderate. The pain has been constant since onset. Pertinent negatives include no inability to bear weight, loss of motion, loss of sensation, muscle weakness, numbness or tingling. He reports no foreign bodies present. The symptoms are aggravated by movement and weight bearing. He has tried ice (voltaren) for the symptoms. The treatment provided mild relief.       Constitution: Negative for chills, fatigue and fever.   HENT: Negative for congestion and sore throat.    Neck: Negative for painful lymph nodes.   Cardiovascular: Negative for chest pain and leg swelling.   Eyes: Negative for double vision and blurred vision.   Respiratory: Negative for cough and shortness of breath.    Gastrointestinal: Negative for nausea, vomiting and diarrhea.   Genitourinary: Negative for dysuria, frequency and urgency.   Musculoskeletal: Negative for joint pain, joint swelling, muscle cramps and muscle ache.   Skin: Negative for color change, pale and rash.   Allergic/Immunologic: Negative for seasonal allergies.   Neurological: Negative for dizziness, history of  vertigo, light-headedness, passing out, headaches and numbness.   Hematologic/Lymphatic: Negative for swollen lymph nodes, easy bruising/bleeding and history of blood clots. Does not bruise/bleed easily.   Psychiatric/Behavioral: Negative for nervous/anxious, sleep disturbance and depression. The patient is not nervous/anxious.        Objective:      Physical Exam   Constitutional: He is oriented to person, place, and time. He appears well-developed and well-nourished. He is cooperative.  Non-toxic appearance. He does not appear ill. No distress.   HENT:   Head: Normocephalic and atraumatic. Head is without abrasion, without contusion and without laceration.   Right Ear: Hearing, tympanic membrane, external ear and ear canal normal. No hemotympanum.   Left Ear: Hearing, tympanic membrane, external ear and ear canal normal. No hemotympanum.   Nose: Nose normal. No mucosal edema, rhinorrhea or nasal deformity. No epistaxis. Right sinus exhibits no maxillary sinus tenderness and no frontal sinus tenderness. Left sinus exhibits no maxillary sinus tenderness and no frontal sinus tenderness.   Mouth/Throat: Uvula is midline, oropharynx is clear and moist and mucous membranes are normal. No trismus in the jaw. Normal dentition. No uvula swelling. No posterior oropharyngeal erythema.   Eyes: Pupils are equal, round, and reactive to light. Conjunctivae, EOM and lids are normal. Right eye exhibits no discharge. Left eye exhibits no discharge. No scleral icterus.   Sclera clear bilat   Neck: Trachea normal, normal range of motion, full passive range of motion without pain and phonation normal. Neck supple. No spinous process tenderness and no muscular tenderness present. No neck rigidity. No tracheal deviation present.   Cardiovascular: Normal rate, regular rhythm, normal heart sounds, intact distal pulses and normal pulses.   Pulses:       Dorsalis pedis pulses are 2+ on the right side, and 2+ on the left side.         Posterior tibial pulses are 2+ on the right side, and 2+ on the left side.   Pulmonary/Chest: Effort normal and breath sounds normal. No respiratory distress.   Abdominal: Soft. Normal appearance and bowel sounds are normal. He exhibits no distension, no pulsatile midline mass and no mass. There is no tenderness.   Musculoskeletal: He exhibits no edema or deformity.        Right ankle: He exhibits decreased range of motion, swelling and ecchymosis. He exhibits no deformity, no laceration and normal pulse. Tenderness. Lateral malleolus tenderness found. No medial malleolus, no AITFL, no CF ligament, no posterior TFL, no head of 5th metatarsal and no proximal fibula tenderness found. Achilles tendon exhibits no pain and no defect.        Feet:    Neurological: He is alert and oriented to person, place, and time. He has normal strength. No cranial nerve deficit or sensory deficit. He exhibits normal muscle tone. He displays no seizure activity. Coordination normal. GCS eye subscore is 4. GCS verbal subscore is 5. GCS motor subscore is 6.   Skin: Skin is warm, dry and intact. Capillary refill takes less than 2 seconds. No abrasion, no bruising, no burn, no ecchymosis and no laceration noted. He is not diaphoretic. No pallor.   Psychiatric: He has a normal mood and affect. His speech is normal and behavior is normal. Judgment and thought content normal. Cognition and memory are normal.   Nursing note and vitals reviewed.        X-ray Ankle Complete 3 View Right    Result Date: 9/29/2019  EXAMINATION: XR ANKLE COMPLETE 3 VIEW RIGHT CLINICAL HISTORY: Unspecified injury of right ankle, initial encounter TECHNIQUE: AP, lateral, and oblique images of the right ankle were performed. COMPARISON: None FINDINGS: Prominence of the soft tissues about the ankle suggesting nonspecific swelling.  Ankle mortise is otherwise well aligned and intact.  No displaced fracture, dislocation or destructive osseous process.  Joint spaces appear  relatively maintained.  No subcutaneous emphysema or radiodense retained foreign body.     Right ankle suspected nonspecific soft tissue swelling without acute displaced fracture-dislocation identified, which may represent sprain. Electronically signed by: Ronn Vizcaino MD Date:    09/29/2019 Time:    13:15    Assessment:       1. Injury of right ankle, initial encounter    2. Injury of right lower leg, initial encounter        Plan:         Injury of right ankle, initial encounter  -     X-Ray Ankle Complete 3 View Right; Future; Expected date: 09/29/2019    Injury of right lower leg, initial encounter  -     X-Ray Ankle Complete 3 View Right; Future; Expected date: 09/29/2019      Patient Instructions       Understanding Ankle Sprain    The ankle is the joint where the leg and foot meet. Bones are held in place by connective tissue called ligaments. When ankle ligaments are stretched to the point of pain and injury, it is called an ankle sprain. A sprain can tear the ligaments. These tears can be very small but still cause pain. Ankle sprains can be mild or severe.  What causes an ankle sprain?  A sprain may occur when you twist your ankle or bend it too far. This can happen when you stumble or fall. Things that can make an ankle sprain more likely include:  · Having had an ankle sprain before  · Playing sports that involve running and jumping. Or playing contact sports such as football or hockey.  · Wearing shoes that dont support your feet and ankles well  · Having ankles with poor strength and flexibility  Symptoms of an ankle sprain  Symptoms may include:  · Pain or soreness in the ankle  · Swelling  · Redness or bruising  · Not being able to walk or put weight on the affected foot  · Reduced range of motion in the ankle  · A popping or tearing feeling at the time the sprain occurs  · An abnormal or dislocated look to the ankle  · Instability or too much range of motion in the ankle  Treatment for an ankle  sprain  Treatment focuses on reducing pain and swelling, and avoiding further injury. Treatments may include:  · Resting the ankle. Avoid putting weight on it. This may mean using crutches until the sprain heals.  · Prescription or over-the-counter pain medicines. These help reduce swelling and pain.  · Cold packs. These help reduce pain and swelling.  · Raising your ankle above your heart. This helps reduce swelling.  · Wrapping the ankle with an elastic bandage or ankle brace. This helps reduce swelling and gives some support to the ankle. In rare cases, you may need a cast or boot.  · Stretching and other exercises. These improve flexibility and strength.  · Heat packs. These may be recommended before doing ankle exercises.  Possible complications of an ankle sprain  An ankle that has been weakened by a sprain can be more likely to have repeated sprains afterward. Doing exercises to strengthen your ankle and improve balance can reduce your risk for repeated sprains. Other possible complications are long-term (chronic) pain or an ankle that remains unstable.  When to call your healthcare provider  Call your healthcare provider right away if you have any of these:  · Fever of 100.4°F (38°C) or higher, or as directed  · Pain, numbness, discoloration, or coldness in the foot or toes  · Pain that gets worse  · Symptoms that dont get better, or get worse  · New symptoms   Date Last Reviewed: 3/10/2016  © 0129-9366 [employer]. [address] [place] [address]. All rights reserved. This information is not intended as a substitute for professional medical care. Always follow your healthcare professional's instructions.    -Xray in the clinic today is negative for a fracture, soft tissue swelling present, concerns of a sprain.  -ice and compression.  -NSAIDs as needed for pain.  -Elevate the affected ankle.  Please follow up with your Primary care provider within 2-5 days if your signs and symptoms  have not resolved or worsen.     If your condition worsens or fails to improve we recommend that you receive another evaluation at the emergency room immediately or contact your primary medical clinic to discuss your concerns.   You must understand that you have received an Urgent Care treatment only and that you may be released before all of your medical problems are known or treated. You, the patient, will arrange for follow up care as instructed.

## 2019-10-14 NOTE — PROGRESS NOTES
"Digital Medicine: Health  Follow-Up    Patient reports he has been doing well.  Patient reports he has been experiencing a little more stress lately due to getting a new position at work.   Patient denies any other concerns at this time.        Follow Up  Follow-up reason(s): reading review      Routine Education Topics: meal planning          Diet:   Patient reports eating or drinking the following: fresh vegetables    Patient reports he has started a weight loss challenge at work and has lost "about 6lbs".  Patient reports he has been working on portion control and eating more veggies.  Patient reports he fills up half his plate"'with vegetables".  Patient reports he also limits fast food "to maybe once/wk" now. Patient is also only drinking water now because his office put in a water machine.     Intervention(s): portion control, meal planning, reducing processed foods and reducing dining out      INTERVENTION(S)  encouragement/support and goal setting      There are no preventive care reminders to display for this patient.    Last 5 Patient Entered Readings                                      Current 30 Day Average: 122/74     Recent Readings 10/9/2019 10/9/2019 10/9/2019 9/24/2019 9/24/2019    SBP (mmHg) 123 128 137 120 131    DBP (mmHg) 63 72 78 71 73    Pulse 68 68 68 63 65                "

## 2019-11-01 NOTE — PROGRESS NOTES
Last 5 Patient Entered Readings                                      Current 30 Day Average: 123/71     Recent Readings 10/9/2019 10/9/2019 10/9/2019 9/24/2019 9/24/2019    SBP (mmHg) 123 128 137 120 131    DBP (mmHg) 63 72 78 71 73    Pulse 68 68 68 63 65        Hypertension Medications             amLODIPine (NORVASC) 10 MG tablet Take 1 tablet (10 mg total) by mouth once daily.    valsartan (DIOVAN) 320 MG tablet Take 1 tablet (320 mg total) by mouth once daily.        Only 3 readings taken on 10/9 within the past month. Will request HC to discuss lack of readings during next encounter. Will continue to monitor. Will follow up in 6 weeks.

## 2019-12-13 NOTE — PROGRESS NOTES
Digital Medicine: Clinician Follow-Up    The history is provided by the patient.     Follow Up  Follow-up reason(s): reading review        HPI:  Called patient to follow up. Patient reports adherence to medication regimen daily and denies missed doses. Patient denies hypotensive s/sx (lightheadedness, dizziness, nausea, fatigue); patient denies hypertensive s/sx (SOB, CP, severe headaches, changes in vision, dizziness, fatigue, confusion, anxiety, nosebleeds).     Last 5 Patient Entered Readings                                      Current 30 Day Average: 123/76     Recent Readings 12/11/2019 12/11/2019 11/27/2019 11/27/2019 11/27/2019    SBP (mmHg) 111 118 135 131 138    DBP (mmHg) 65 70 78 76 80    Pulse 69 69 64 68 65        Assessment:  Reviewed recent readings and labs (last CMP drawn on 4/30). Per 2017 ACC/ AHA HTN guidelines (goal of BP < 130/80), current 30-day average is well controlled.     Plan:  Continue current medication regimen.   Instructed patient to increase frequency of monitoring to 1x per week.   Instructed patient to call if BP remains > 130/80.   Patients health  will be following up as scheduled.   I will continue to monitor regularly and will follow-up in 16 weeks, sooner if blood pressure begins to trend upward or downward.     Current medication regimen:  Hypertension Medications             amLODIPine (NORVASC) 10 MG tablet Take 1 tablet (10 mg total) by mouth once daily.    valsartan (DIOVAN) 320 MG tablet Take 1 tablet (320 mg total) by mouth once daily.         Patient denies having questions or concerns. Patient has my contact information and knows to call with any concerns or clinical changes.

## 2019-12-18 ENCOUNTER — PATIENT OUTREACH (OUTPATIENT)
Dept: OTHER | Facility: OTHER | Age: 44
End: 2019-12-18

## 2019-12-18 NOTE — PROGRESS NOTES
"Digital Medicine: Health  Follow-Up    Patient denies any other concerns at this time.           Follow Up  Follow-up reason(s): reading review        INTERVENTION(S)  encouragement/support and denied further coaching    PLAN  continue monitoring      There are no preventive care reminders to display for this patient.    Last 5 Patient Entered Readings                                      Current 30 Day Average: 123/76     Recent Readings 12/11/2019 12/11/2019 11/27/2019 11/27/2019 11/27/2019    SBP (mmHg) 111 118 135 131 138    DBP (mmHg) 65 70 78 76 80    Pulse 69 69 64 68 65            Diet Screening       Patient reports his son has been "putting him on a eating plan".  Patient reports he will look at foods in the grocery store and has been "making him eat clean".  Patient reports he went to a restaurant today and his son "did not let him order what he wanted".  Gave encouragement to patient.     Physical Activity Screening   No change to exercise routine.        "

## 2020-02-19 ENCOUNTER — PATIENT OUTREACH (OUTPATIENT)
Dept: OTHER | Facility: OTHER | Age: 45
End: 2020-02-19

## 2020-03-25 NOTE — PROGRESS NOTES
"Digital Medicine: Health  Follow-Up    Patient reports he is stressed and "anixious" from his new job and his wife works at [employer] and has had to go into the hospital recently.         Follow Up  Follow-up reason(s): reading review      Readings are trending up   Patient reports his readings are trending up due to stress from covid-19.       INTERVENTION(S)  recommended diet modifications, recommend physical activity and encouragement/support    PLAN  continue monitoring      There are no preventive care reminders to display for this patient.    Last 5 Patient Entered Readings                                      Current 30 Day Average: 131/73     Recent Readings 3/12/2020 3/12/2020 3/12/2020 3/12/2020 3/12/2020    SBP (mmHg) 131 142 138 139 132    DBP (mmHg) 71 76 74 74 72    Pulse 69 68 71 71 75            Diet Screening       Encouraged patient to continue to watch his sodium intake.     Physical Activity Screening       Encouraged patient to find was to continue to stay active.     "

## 2020-04-02 ENCOUNTER — PATIENT OUTREACH (OUTPATIENT)
Dept: OTHER | Facility: OTHER | Age: 45
End: 2020-04-02

## 2020-04-02 DIAGNOSIS — I10 HYPERTENSION, ESSENTIAL: ICD-10-CM

## 2020-04-02 RX ORDER — AMLODIPINE BESYLATE 10 MG/1
10 TABLET ORAL DAILY
Qty: 90 TABLET | Refills: 1 | Status: SHIPPED | OUTPATIENT
Start: 2020-04-02 | End: 2020-10-15 | Stop reason: SDUPTHER

## 2020-04-02 NOTE — PROGRESS NOTES
"Digital Medicine: Clinician Follow-Up    Called patient for BP follow up. Patient reports doing well "except for normal stress". He follows with a psychiatrist. He is doing well from a BP standpoint and no medication-related issues. Patient requests a refill on amlodipine.       The history is provided by the patient.     Follow Up  Follow-up reason(s): reading review and routine education    Patient's 30-day BP average is at goal of <130/<80 mmHg.       INTERVENTION(S)  reviewed appropriate dose schedule    PLAN  patient verbalizes understanding and continue monitoring    Continue current medications.     Scheduled CMP for 06/2020    Patient knows to contact me with any non-urgent clinical changes or concerns. Go to ED or call [facility] for emergencies.         There are no preventive care reminders to display for this patient.    Last 5 Patient Entered Readings                                      Current 30 Day Average: 127/74     Recent Readings 3/25/2020 3/25/2020 3/25/2020 3/12/2020 3/12/2020    SBP (mmHg) 123 134 146 131 142    DBP (mmHg) 71 76 74 71 76    Pulse 66 68 69 69 68          Hypertension Medications     amLODIPine (NORVASC) 10 MG tablet Take 1 tablet (10 mg total) by mouth once daily.    valsartan (DIOVAN) 320 MG tablet Take 1 tablet (320 mg total) by mouth once daily.                 Screenings  "

## 2020-04-23 ENCOUNTER — PATIENT MESSAGE (OUTPATIENT)
Dept: INTERNAL MEDICINE | Facility: CLINIC | Age: 45
End: 2020-04-23

## 2020-05-17 ENCOUNTER — NURSE TRIAGE (OUTPATIENT)
Dept: ADMINISTRATIVE | Facility: CLINIC | Age: 45
End: 2020-05-17

## 2020-05-17 NOTE — TELEPHONE ENCOUNTER
Reason for Disposition   Health Information question, no triage required and triager able to answer question    Additional Information   Negative: [1] Caller is not with the adult (patient) AND [2] reporting urgent symptoms   Negative: Lab result questions   Negative: Medication questions   Negative: Caller can't be reached by phone   Negative: Caller has already spoken to PCP or another triager   Negative: RN needs further essential information from caller in order to complete triage   Negative: Requesting regular office appointment   Negative: [1] Caller requesting NON-URGENT health information AND [2] PCP's office is the best resource    Protocols used: INFORMATION ONLY CALL-A-  Pt called re 20 yo son went to grad party fri pm. not sure how many people were at party. Caller states son was then around family and other relatives. Caller reports that parents with DM, elderly. Offered covid 19 guideline for poss exposure. Call back with questions.

## 2020-06-03 ENCOUNTER — LAB VISIT (OUTPATIENT)
Dept: LAB | Facility: HOSPITAL | Age: 45
End: 2020-06-03
Attending: NURSE PRACTITIONER
Payer: COMMERCIAL

## 2020-06-03 DIAGNOSIS — I10 HYPERTENSION, ESSENTIAL: ICD-10-CM

## 2020-06-03 LAB
ALBUMIN SERPL BCP-MCNC: 4.3 G/DL (ref 3.5–5.2)
ALP SERPL-CCNC: 23 U/L (ref 55–135)
ALT SERPL W/O P-5'-P-CCNC: 43 U/L (ref 10–44)
ANION GAP SERPL CALC-SCNC: 7 MMOL/L (ref 8–16)
AST SERPL-CCNC: 19 U/L (ref 10–40)
BILIRUB SERPL-MCNC: 0.7 MG/DL (ref 0.1–1)
BUN SERPL-MCNC: 11 MG/DL (ref 6–20)
CALCIUM SERPL-MCNC: 10.1 MG/DL (ref 8.7–10.5)
CHLORIDE SERPL-SCNC: 105 MMOL/L (ref 95–110)
CO2 SERPL-SCNC: 27 MMOL/L (ref 23–29)
CREAT SERPL-MCNC: 0.9 MG/DL (ref 0.5–1.4)
EST. GFR  (AFRICAN AMERICAN): >60 ML/MIN/1.73 M^2
EST. GFR  (NON AFRICAN AMERICAN): >60 ML/MIN/1.73 M^2
GLUCOSE SERPL-MCNC: 113 MG/DL (ref 70–110)
POTASSIUM SERPL-SCNC: 4.1 MMOL/L (ref 3.5–5.1)
PROT SERPL-MCNC: 7.5 G/DL (ref 6–8.4)
SODIUM SERPL-SCNC: 139 MMOL/L (ref 136–145)

## 2020-06-03 PROCEDURE — 36415 COLL VENOUS BLD VENIPUNCTURE: CPT

## 2020-06-03 PROCEDURE — 80053 COMPREHEN METABOLIC PANEL: CPT

## 2020-06-04 RX ORDER — VALSARTAN 320 MG/1
320 TABLET ORAL DAILY
Qty: 90 TABLET | Refills: 3 | Status: SHIPPED | OUTPATIENT
Start: 2020-06-04 | End: 2021-05-26 | Stop reason: SDUPTHER

## 2020-06-09 ENCOUNTER — PATIENT OUTREACH (OUTPATIENT)
Dept: OTHER | Facility: OTHER | Age: 45
End: 2020-06-09

## 2020-06-12 NOTE — PROGRESS NOTES
Digital Medicine: Clinician Follow-Up    Called patient for follow up. He says that PCP reviewed lab work with him and renewed valsartan rx. Patient says that has been following a diet for the past 6 weeks and has been doing a lot more walking and resistance training. He reports 11 lb weight loss with lifestyle changes.         The history is provided by the patient.   Follow Up  Follow-up reason(s): reading review and routine education      Routine Education Topics: eating patterns and physical activity  Patient's 30-day BP average is at goal of <130/<80 mmHg.       INTERVENTION(S)  recommended diet modifications, recommended physical activity, encouragement/support and denied questions    PLAN  patient verbalizes understanding and continue monitoring    Continue current therapy  Reviewed most recent CMP  Encouraged patient to continue lifestyle changes. Monitor for downward trend in BP with weight loss. Report s/sx of hypotension. May require medication dose adjustments      There are no preventive care reminders to display for this patient.    Last 5 Patient Entered Readings                                      Current 30 Day Average: 123/72     Recent Readings 6/10/2020 6/10/2020 6/10/2020 6/10/2020 5/28/2020    SBP (mmHg) 130 133 129 131 116    DBP (mmHg) 66 74 72 75 69    Pulse 61 60 62 65 67             Hypertension Medications     amLODIPine (NORVASC) 10 MG tablet Take 1 tablet (10 mg total) by mouth once daily.    valsartan (DIOVAN) 320 MG tablet Take 1 tablet (320 mg total) by mouth once daily.                 Screenings

## 2020-07-17 ENCOUNTER — OFFICE VISIT (OUTPATIENT)
Dept: URGENT CARE | Facility: CLINIC | Age: 45
End: 2020-07-17
Payer: COMMERCIAL

## 2020-07-17 VITALS
TEMPERATURE: 97 F | BODY MASS INDEX: 42.66 KG/M2 | WEIGHT: 315 LBS | DIASTOLIC BLOOD PRESSURE: 92 MMHG | OXYGEN SATURATION: 96 % | HEIGHT: 72 IN | SYSTOLIC BLOOD PRESSURE: 155 MMHG | HEART RATE: 118 BPM | RESPIRATION RATE: 20 BRPM

## 2020-07-17 DIAGNOSIS — J30.9 ALLERGIC RHINITIS WITH POSTNASAL DRIP: Primary | ICD-10-CM

## 2020-07-17 DIAGNOSIS — R09.82 ALLERGIC RHINITIS WITH POSTNASAL DRIP: Primary | ICD-10-CM

## 2020-07-17 DIAGNOSIS — R05.9 COUGH: ICD-10-CM

## 2020-07-17 DIAGNOSIS — Z20.822 EXPOSURE TO COVID-19 VIRUS: ICD-10-CM

## 2020-07-17 PROCEDURE — U0003 INFECTIOUS AGENT DETECTION BY NUCLEIC ACID (DNA OR RNA); SEVERE ACUTE RESPIRATORY SYNDROME CORONAVIRUS 2 (SARS-COV-2) (CORONAVIRUS DISEASE [COVID-19]), AMPLIFIED PROBE TECHNIQUE, MAKING USE OF HIGH THROUGHPUT TECHNOLOGIES AS DESCRIBED BY CMS-2020-01-R: HCPCS

## 2020-07-17 PROCEDURE — 99213 OFFICE O/P EST LOW 20 MIN: CPT | Mod: S$GLB,,, | Performed by: PHYSICIAN ASSISTANT

## 2020-07-17 PROCEDURE — 99213 PR OFFICE/OUTPT VISIT, EST, LEVL III, 20-29 MIN: ICD-10-PCS | Mod: S$GLB,,, | Performed by: PHYSICIAN ASSISTANT

## 2020-07-18 NOTE — PROGRESS NOTES
Subjective:       Patient ID: Sarkis Saez Jr. is a 45 y.o. male.    Vitals:  height is 6' (1.829 m) and weight is 150.6 kg (332 lb 0.2 oz) (abnormal). His temperature is 97 °F (36.1 °C). His blood pressure is 155/92 (abnormal) and his pulse is 118 (abnormal). His respiration is 20 and oxygen saturation is 96%.     Chief Complaint: COVID-19 Concerns    Patient presents requesting COVID swab.  He states that his wife just tested positive.  He states that he feels fine, has been experiencing postnasal drip for the past few weeks, and has coughed here and there.  No chest pain or shortness of breath.  No fever, body aches.  He attributes these symptoms to the seasonal allergies, as he deals with these symptoms regularly.  He is concerned about COVID and is requesting a swab.      Constitution: Negative for chills, sweating, fatigue, fever and unexpected weight change.   HENT: Positive for postnasal drip. Negative for ear pain, congestion, sinus pain, sinus pressure, sore throat and trouble swallowing.    Neck: Negative for neck pain, neck stiffness, painful lymph nodes, neck swelling and degenerative disc disease.   Cardiovascular: Negative for chest pain, leg swelling, palpitations and sob on exertion.   Eyes: Negative for eye itching, eye pain, eye redness, double vision and blurred vision.   Respiratory: Positive for cough. Negative for chest tightness, sputum production, bloody sputum, COPD, shortness of breath, stridor, wheezing and asthma.    Gastrointestinal: Negative for abdominal pain, nausea, vomiting and diarrhea.   Genitourinary: Negative for dysuria, frequency, urgency, flank pain and hematuria.   Musculoskeletal: Negative for pain, joint pain, joint swelling, muscle cramps and muscle ache.   Skin: Negative for color change, pale and rash.   Allergic/Immunologic: Negative for seasonal allergies and asthma.   Neurological: Negative for dizziness, history of vertigo, light-headedness, passing out, loss  of balance, headaches, altered mental status, loss of consciousness, numbness and tingling.   Hematologic/Lymphatic: Negative for swollen lymph nodes, easy bruising/bleeding and history of blood clots. Does not bruise/bleed easily.   Psychiatric/Behavioral: Negative for altered mental status, nervous/anxious, sleep disturbance and depression. The patient is not nervous/anxious.        Objective:      Physical Exam   Constitutional: He is oriented to person, place, and time. He appears well-developed. He is cooperative.  Non-toxic appearance. He does not appear ill. No distress.      Comments:Patient appears well.  Nontoxic appearing.  No acute distress.   HENT:   Head: Normocephalic and atraumatic.   Ears:   Right Ear: Hearing, tympanic membrane, external ear and ear canal normal.   Left Ear: Hearing, tympanic membrane, external ear and ear canal normal.   Nose: Mucosal edema present. No rhinorrhea, purulent discharge or nasal deformity. No epistaxis. Right sinus exhibits no maxillary sinus tenderness and no frontal sinus tenderness. Left sinus exhibits no maxillary sinus tenderness and no frontal sinus tenderness.   Mouth/Throat: Uvula is midline, oropharynx is clear and moist and mucous membranes are normal. No trismus in the jaw. Normal dentition. No uvula swelling, lacerations or dental caries. No oropharyngeal exudate, posterior oropharyngeal edema, posterior oropharyngeal erythema, tonsillar abscesses or cobblestoning. Tonsils are 1+ on the right. Tonsils are 1+ on the left. No tonsillar exudate.   Eyes: Conjunctivae and lids are normal. No scleral icterus.   Neck: Trachea normal, full passive range of motion without pain and phonation normal. Neck supple. No neck rigidity. No edema and no erythema present.   Cardiovascular: Normal rate, regular rhythm, normal heart sounds and normal pulses.      Comments: Patient is not tachycardic on auscultation. Regular rhythm   Pulmonary/Chest: Effort normal and breath  "sounds normal. No accessory muscle usage or stridor. No tachypnea and no bradypnea. No respiratory distress. He has no decreased breath sounds. He has no wheezes. He has no rhonchi. He has no rales.   Lungs clear to auscultation bilaterally.  No cough throughout exam.  Patient speaking complete sentences with no evidence of respiratory distress.    Comments: Lungs clear to auscultation bilaterally.  No cough throughout exam.  Patient speaking complete sentences with no evidence of respiratory distress.    Abdominal: Normal appearance.   Musculoskeletal: Normal range of motion.         General: No deformity.   Lymphadenopathy:        Head (right side): No submandibular, no preauricular and no posterior auricular adenopathy present.        Head (left side): No submandibular, no preauricular and no posterior auricular adenopathy present.     He has no cervical adenopathy.   Neurological: He is alert and oriented to person, place, and time. He exhibits normal muscle tone. Coordination normal.   Skin: Skin is warm, dry, intact, not diaphoretic and not pale. Psychiatric: His speech is normal and behavior is normal. Judgment and thought content normal. His mood appears anxious.      Comments: Patient admittedly very anxious.  He notes "I am scared to death now that my wife is positive.  I do not want to die from COVID."  He does ask a lot of questions, and all them are answered.    Nursing note and vitals reviewed.        Assessment:       1. Allergic rhinitis with postnasal drip    2. Cough    3. Exposure to Covid-19 Virus        Plan:         Allergic rhinitis with postnasal drip    Cough  -     COVID-19 Routine Screening    Exposure to Covid-19 Virus      Patient Instructions   - Rest.    - Drink plenty of fluids.      - Viral upper respiratory infections typically run their course in 10-14 days.     - Tylenol or Ibuprofen as directed as needed for fever/pain. Avoid tylenol if you have a history of liver disease. Do not " take ibuprofen if you have a history of GI bleeding, kidney disease, or if you take blood thinners.     - You can take over-the-counter claritin, zyrtec, allegra, or xyzal as directed. These are antihistamines that can help with runny nose, nasal congestion, sneezing, and helps to dry up post-nasal drip, which usually causes sore throat and cough.   - If you do NOT have high blood pressure, you may use a decongestant form (D)  of this medication or if you do not take the D form, you can take sudafed  (pseudoephedrine) over the counter, which is a decongestant.    - You can use Flonase (fluticasone) nasal spray as directed for sinus congestion and postnasal drip. This is a steroid nasal spray that works locally over time to decrease the inflammation in your nose/sinuses and help with allergic symptoms. This is not an quick- relief spray like afrin, but it works well if used daily.  Discontinue if you develop nose bleed  - use nasal saline prior to Flonase.    - Use Ocean Spray Nasal Saline 1-3 puffs each nostril every 2-3 hours then blow out onto tissue. This is to irrigate the nasal passage way to clear the sinus openings. Use until sinus problem resolved.      - you can take plain Mucinex (guaifenesin) 1200 mg twice a day to help loosen mucous    -warm salt water gargles can help with sore throat    - Follow up with your PCP or specialty clinic as directed in the next 1-2 weeks if not improved or as needed.  You can call [phone] to schedule an appointment with the appropriate provider.      - Go to the ER if you develop new or worsening symptoms.     - You must understand that you have received an Urgent Care treatment only and that you may be released before all of your medical problems are known or treated.   - You, the patient, will arrange for follow up care as instructed.   - If your condition worsens or fails to improve we recommend that you receive another evaluation at the ER immediately or contact  your PCP to discuss your concerns or return here.

## 2020-07-18 NOTE — PATIENT INSTRUCTIONS
- Rest.    - Drink plenty of fluids.      - Viral upper respiratory infections typically run their course in 10-14 days.     - Tylenol or Ibuprofen as directed as needed for fever/pain. Avoid tylenol if you have a history of liver disease. Do not take ibuprofen if you have a history of GI bleeding, kidney disease, or if you take blood thinners.     - You can take over-the-counter claritin, zyrtec, allegra, or xyzal as directed. These are antihistamines that can help with runny nose, nasal congestion, sneezing, and helps to dry up post-nasal drip, which usually causes sore throat and cough.   - If you do NOT have high blood pressure, you may use a decongestant form (D)  of this medication or if you do not take the D form, you can take sudafed  (pseudoephedrine) over the counter, which is a decongestant.    - You can use Flonase (fluticasone) nasal spray as directed for sinus congestion and postnasal drip. This is a steroid nasal spray that works locally over time to decrease the inflammation in your nose/sinuses and help with allergic symptoms. This is not an quick- relief spray like afrin, but it works well if used daily.  Discontinue if you develop nose bleed  - use nasal saline prior to Flonase.    - Use Ocean Spray Nasal Saline 1-3 puffs each nostril every 2-3 hours then blow out onto tissue. This is to irrigate the nasal passage way to clear the sinus openings. Use until sinus problem resolved.      - you can take plain Mucinex (guaifenesin) 1200 mg twice a day to help loosen mucous    -warm salt water gargles can help with sore throat    - Follow up with your PCP or specialty clinic as directed in the next 1-2 weeks if not improved or as needed.  You can call [phone] to schedule an appointment with the appropriate provider.      - Go to the ER if you develop new or worsening symptoms.     - You must understand that you have received an Urgent Care treatment only and that you may be released before all of  your medical problems are known or treated.   - You, the patient, will arrange for follow up care as instructed.   - If your condition worsens or fails to improve we recommend that you receive another evaluation at the ER immediately or contact your PCP to discuss your concerns or return here.

## 2020-07-20 LAB — SARS-COV-2 RNA RESP QL NAA+PROBE: NOT DETECTED

## 2020-07-29 ENCOUNTER — PATIENT MESSAGE (OUTPATIENT)
Dept: INTERNAL MEDICINE | Facility: CLINIC | Age: 45
End: 2020-07-29

## 2020-07-29 DIAGNOSIS — G47.9 SLEEP DISTURBANCE: Primary | ICD-10-CM

## 2020-07-30 ENCOUNTER — TELEPHONE (OUTPATIENT)
Dept: ADMINISTRATIVE | Facility: OTHER | Age: 45
End: 2020-07-30

## 2020-07-30 NOTE — TELEPHONE ENCOUNTER
Left voice message for patient to return call to schedule appointment from referral to Sleep Medicine.  Nichole MALAGON [phone]

## 2020-10-05 ENCOUNTER — PATIENT MESSAGE (OUTPATIENT)
Dept: ADMINISTRATIVE | Facility: HOSPITAL | Age: 45
End: 2020-10-05

## 2020-11-20 ENCOUNTER — PATIENT OUTREACH (OUTPATIENT)
Dept: OTHER | Facility: OTHER | Age: 45
End: 2020-11-20

## 2020-11-20 NOTE — PROGRESS NOTES
Digital Medicine: Health  Follow-Up    The history is provided by the patient.             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 He states that he lost 40 pounds since march. He states that he joined the noom program which really worked for him. Congratulated him on all of these accomplishments.             Diet-Change  No 24 hour dietary recall    Dietary Improvements:Patient states that he has been logging his food in the noom program. He states that his ideal is to get down to 275 lbs. or 250 lbs. He states that he is in a bit of a plateau with the weight loss at this point. He states that they do not eat out as much as they used to. He states that his wife has been in the pathway to wellness program which has been helping her lose weight as well. He states that the noom program is over now. Patient states that he has previously had success with intermittent fasting. He states that he stopped with his new schedule. Patient states that he downloaded [employer] but has not started using it yet. Advised him to try tracking his diet as well as physical activity again to stay on track to reach his personal weight loss goals.               Physical Activity-Not assessed    Medication Adherence-Medication Adherence not addressed.      Substance, Sleep, Stress-Not assessed      Continue current diet/physical activity routine.       Addressed patient questions and patient has my contact information if needed prior to next outreach. Patient verbalizes understanding.      Explained the importance of self-monitoring and medication adherence. Encouraged the patient to communicate with their health  for lifestyle modifications to help improve or maintain a healthy lifestyle.               There are no preventive care reminders to display for this patient.      Last 5 Patient  Entered Readings                                      Current 30 Day Average: 126/72     Recent Readings 11/8/2020 11/8/2020 11/8/2020 11/8/2020 9/30/2020    SBP (mmHg) 126 133 120 122 114    DBP (mmHg) 72 71 70 73 69    Pulse 58 57 57 59 59

## 2020-11-27 ENCOUNTER — PATIENT OUTREACH (OUTPATIENT)
Dept: OTHER | Facility: OTHER | Age: 45
End: 2020-11-27

## 2020-12-21 NOTE — PROGRESS NOTES
Digital Medicine: Clinician Follow-Up    Called patient for BP follow up. He confirms taking amlodipine and valsartan and is tolerating well. He has no complaints today    The history is provided by the patient.   Follow-up reason(s): routine follow up.     Hypertension    Patient's blood pressure is stable.         Last 5 Patient Entered Readings                                      Current 30 Day Average: 126/65     Recent Readings 12/20/2020 12/20/2020 12/20/2020 12/20/2020 11/8/2020    SBP (mmHg) 126 125 125 133 126    DBP (mmHg) 64 65 66 66 72    Pulse 60 60 60 60 58                 Depression Screening  Sarkis Saez Jr. screened negative on the depression screening.     Sleep Apnea Screening  Patient not previously diagnosed with LEE       Medication Affordability Screening  Patient screened negative on the medication affordability questionnaires. Patient is currently not having problems affording medications    Medication Adherence-Medication adherence was asssessed.  Patient continue taking medication as prescribed.            ASSESSMENT(S)  Patients BP average is 126/65 mmHg, which is above goal. Patient's BP goal is less than or equal to 130/80.     Hypertension Plan  Continue current therapy.       Addressed patient questions and patient has my contact information if needed prior to next outreach. Patient verbalizes understanding.             There are no preventive care reminders to display for this patient.  There are no preventive care reminders to display for this patient.      Hypertension Medications     amLODIPine (NORVASC) 10 MG tablet Take 1 tablet (10 mg total) by mouth once daily.    valsartan (DIOVAN) 320 MG tablet Take 1 tablet (320 mg total) by mouth once daily.

## 2020-12-23 ENCOUNTER — PATIENT MESSAGE (OUTPATIENT)
Dept: ADMINISTRATIVE | Facility: OTHER | Age: 45
End: 2020-12-23

## 2021-01-02 ENCOUNTER — CLINICAL SUPPORT (OUTPATIENT)
Dept: URGENT CARE | Facility: CLINIC | Age: 46
End: 2021-01-02
Payer: COMMERCIAL

## 2021-01-02 VITALS
DIASTOLIC BLOOD PRESSURE: 82 MMHG | HEART RATE: 68 BPM | OXYGEN SATURATION: 98 % | TEMPERATURE: 98 F | RESPIRATION RATE: 18 BRPM | SYSTOLIC BLOOD PRESSURE: 146 MMHG

## 2021-01-02 DIAGNOSIS — R05.9 COUGH: Primary | ICD-10-CM

## 2021-01-02 LAB
CTP QC/QA: YES
SARS-COV-2 RDRP RESP QL NAA+PROBE: NEGATIVE

## 2021-01-02 PROCEDURE — U0002 COVID-19 LAB TEST NON-CDC: HCPCS | Mod: QW,S$GLB,, | Performed by: EMERGENCY MEDICINE

## 2021-01-02 PROCEDURE — U0002: ICD-10-PCS | Mod: QW,S$GLB,, | Performed by: EMERGENCY MEDICINE

## 2021-01-04 ENCOUNTER — PATIENT MESSAGE (OUTPATIENT)
Dept: ADMINISTRATIVE | Facility: HOSPITAL | Age: 46
End: 2021-01-04

## 2021-01-13 ENCOUNTER — PATIENT MESSAGE (OUTPATIENT)
Dept: INTERNAL MEDICINE | Facility: CLINIC | Age: 46
End: 2021-01-13

## 2021-01-27 ENCOUNTER — TELEPHONE (OUTPATIENT)
Dept: INTERNAL MEDICINE | Facility: CLINIC | Age: 46
End: 2021-01-27

## 2021-01-29 ENCOUNTER — TELEPHONE (OUTPATIENT)
Dept: ADMINISTRATIVE | Facility: HOSPITAL | Age: 46
End: 2021-01-29

## 2021-02-25 ENCOUNTER — IMMUNIZATION (OUTPATIENT)
Dept: PHARMACY | Facility: CLINIC | Age: 46
End: 2021-02-25
Payer: COMMERCIAL

## 2021-02-25 DIAGNOSIS — Z23 NEED FOR VACCINATION: Primary | ICD-10-CM

## 2021-03-25 ENCOUNTER — IMMUNIZATION (OUTPATIENT)
Dept: PHARMACY | Facility: CLINIC | Age: 46
End: 2021-03-25
Payer: COMMERCIAL

## 2021-03-25 DIAGNOSIS — Z23 NEED FOR VACCINATION: Primary | ICD-10-CM

## 2021-04-05 ENCOUNTER — PATIENT MESSAGE (OUTPATIENT)
Dept: ADMINISTRATIVE | Facility: HOSPITAL | Age: 46
End: 2021-04-05

## 2021-04-12 RX ORDER — GABAPENTIN 300 MG/1
300 CAPSULE ORAL 3 TIMES DAILY
Qty: 90 CAPSULE | Refills: 2 | Status: SHIPPED | OUTPATIENT
Start: 2021-04-12 | End: 2021-04-25 | Stop reason: CLARIF

## 2021-04-16 ENCOUNTER — TELEPHONE (OUTPATIENT)
Dept: INTERNAL MEDICINE | Facility: CLINIC | Age: 46
End: 2021-04-16

## 2021-04-18 DIAGNOSIS — I10 HYPERTENSION, ESSENTIAL: ICD-10-CM

## 2021-04-18 DIAGNOSIS — Z00.00 ANNUAL PHYSICAL EXAM: Primary | ICD-10-CM

## 2021-04-19 RX ORDER — AMLODIPINE BESYLATE 10 MG/1
10 TABLET ORAL DAILY
Qty: 90 TABLET | Refills: 1 | Status: SHIPPED | OUTPATIENT
Start: 2021-04-19 | End: 2021-07-27 | Stop reason: SDUPTHER

## 2021-05-26 RX ORDER — VALSARTAN 320 MG/1
320 TABLET ORAL DAILY
Qty: 90 TABLET | Refills: 0 | Status: SHIPPED | OUTPATIENT
Start: 2021-05-26 | End: 2021-07-27 | Stop reason: SDUPTHER

## 2021-07-27 ENCOUNTER — OFFICE VISIT (OUTPATIENT)
Dept: INTERNAL MEDICINE | Facility: CLINIC | Age: 46
End: 2021-07-27
Attending: FAMILY MEDICINE
Payer: COMMERCIAL

## 2021-07-27 ENCOUNTER — LAB VISIT (OUTPATIENT)
Dept: LAB | Facility: HOSPITAL | Age: 46
End: 2021-07-27
Attending: FAMILY MEDICINE
Payer: COMMERCIAL

## 2021-07-27 VITALS
OXYGEN SATURATION: 93 % | HEART RATE: 97 BPM | DIASTOLIC BLOOD PRESSURE: 75 MMHG | BODY MASS INDEX: 42.66 KG/M2 | SYSTOLIC BLOOD PRESSURE: 125 MMHG | WEIGHT: 315 LBS | HEIGHT: 72 IN

## 2021-07-27 DIAGNOSIS — I10 HYPERTENSION, ESSENTIAL: ICD-10-CM

## 2021-07-27 DIAGNOSIS — Z00.00 ANNUAL PHYSICAL EXAM: Primary | ICD-10-CM

## 2021-07-27 DIAGNOSIS — Z00.00 ANNUAL PHYSICAL EXAM: ICD-10-CM

## 2021-07-27 PROBLEM — R61 NIGHT SWEATS: Status: RESOLVED | Noted: 2019-04-30 | Resolved: 2021-07-27

## 2021-07-27 LAB
ALBUMIN SERPL BCP-MCNC: 4.2 G/DL (ref 3.5–5.2)
ALP SERPL-CCNC: 23 U/L (ref 55–135)
ALT SERPL W/O P-5'-P-CCNC: 31 U/L (ref 10–44)
ANION GAP SERPL CALC-SCNC: 9 MMOL/L (ref 8–16)
AST SERPL-CCNC: 18 U/L (ref 10–40)
BILIRUB SERPL-MCNC: 0.8 MG/DL (ref 0.1–1)
BUN SERPL-MCNC: 11 MG/DL (ref 6–20)
CALCIUM SERPL-MCNC: 10.4 MG/DL (ref 8.7–10.5)
CHLORIDE SERPL-SCNC: 104 MMOL/L (ref 95–110)
CHOLEST SERPL-MCNC: 216 MG/DL (ref 120–199)
CHOLEST/HDLC SERPL: 5.3 {RATIO} (ref 2–5)
CO2 SERPL-SCNC: 27 MMOL/L (ref 23–29)
CREAT SERPL-MCNC: 0.9 MG/DL (ref 0.5–1.4)
EST. GFR  (AFRICAN AMERICAN): >60 ML/MIN/1.73 M^2
EST. GFR  (NON AFRICAN AMERICAN): >60 ML/MIN/1.73 M^2
GLUCOSE SERPL-MCNC: 105 MG/DL (ref 70–110)
HDLC SERPL-MCNC: 41 MG/DL (ref 40–75)
HDLC SERPL: 19 % (ref 20–50)
LDLC SERPL CALC-MCNC: 140.4 MG/DL (ref 63–159)
NONHDLC SERPL-MCNC: 175 MG/DL
POTASSIUM SERPL-SCNC: 4.5 MMOL/L (ref 3.5–5.1)
PROT SERPL-MCNC: 7.7 G/DL (ref 6–8.4)
SODIUM SERPL-SCNC: 140 MMOL/L (ref 136–145)
TRIGL SERPL-MCNC: 173 MG/DL (ref 30–150)

## 2021-07-27 PROCEDURE — 83036 HEMOGLOBIN GLYCOSYLATED A1C: CPT | Performed by: FAMILY MEDICINE

## 2021-07-27 PROCEDURE — 3078F PR MOST RECENT DIASTOLIC BLOOD PRESSURE < 80 MM HG: ICD-10-PCS | Mod: CPTII,S$GLB,, | Performed by: FAMILY MEDICINE

## 2021-07-27 PROCEDURE — 3074F PR MOST RECENT SYSTOLIC BLOOD PRESSURE < 130 MM HG: ICD-10-PCS | Mod: CPTII,S$GLB,, | Performed by: FAMILY MEDICINE

## 2021-07-27 PROCEDURE — 99396 PR PREVENTIVE VISIT,EST,40-64: ICD-10-PCS | Mod: S$GLB,,, | Performed by: FAMILY MEDICINE

## 2021-07-27 PROCEDURE — 1160F RVW MEDS BY RX/DR IN RCRD: CPT | Mod: CPTII,S$GLB,, | Performed by: FAMILY MEDICINE

## 2021-07-27 PROCEDURE — 1159F MED LIST DOCD IN RCRD: CPT | Mod: CPTII,S$GLB,, | Performed by: FAMILY MEDICINE

## 2021-07-27 PROCEDURE — 1126F PR PAIN SEVERITY QUANTIFIED, NO PAIN PRESENT: ICD-10-PCS | Mod: CPTII,S$GLB,, | Performed by: FAMILY MEDICINE

## 2021-07-27 PROCEDURE — 3008F BODY MASS INDEX DOCD: CPT | Mod: CPTII,S$GLB,, | Performed by: FAMILY MEDICINE

## 2021-07-27 PROCEDURE — 80061 LIPID PANEL: CPT | Performed by: FAMILY MEDICINE

## 2021-07-27 PROCEDURE — 3078F DIAST BP <80 MM HG: CPT | Mod: CPTII,S$GLB,, | Performed by: FAMILY MEDICINE

## 2021-07-27 PROCEDURE — 3074F SYST BP LT 130 MM HG: CPT | Mod: CPTII,S$GLB,, | Performed by: FAMILY MEDICINE

## 2021-07-27 PROCEDURE — 1159F PR MEDICATION LIST DOCUMENTED IN MEDICAL RECORD: ICD-10-PCS | Mod: CPTII,S$GLB,, | Performed by: FAMILY MEDICINE

## 2021-07-27 PROCEDURE — 99999 PR PBB SHADOW E&M-EST. PATIENT-LVL III: CPT | Mod: PBBFAC,,, | Performed by: FAMILY MEDICINE

## 2021-07-27 PROCEDURE — 99396 PREV VISIT EST AGE 40-64: CPT | Mod: S$GLB,,, | Performed by: FAMILY MEDICINE

## 2021-07-27 PROCEDURE — 1126F AMNT PAIN NOTED NONE PRSNT: CPT | Mod: CPTII,S$GLB,, | Performed by: FAMILY MEDICINE

## 2021-07-27 PROCEDURE — 3008F PR BODY MASS INDEX (BMI) DOCUMENTED: ICD-10-PCS | Mod: CPTII,S$GLB,, | Performed by: FAMILY MEDICINE

## 2021-07-27 PROCEDURE — 99999 PR PBB SHADOW E&M-EST. PATIENT-LVL III: ICD-10-PCS | Mod: PBBFAC,,, | Performed by: FAMILY MEDICINE

## 2021-07-27 PROCEDURE — 36415 COLL VENOUS BLD VENIPUNCTURE: CPT | Performed by: FAMILY MEDICINE

## 2021-07-27 PROCEDURE — 80053 COMPREHEN METABOLIC PANEL: CPT | Performed by: FAMILY MEDICINE

## 2021-07-27 PROCEDURE — 1160F PR REVIEW ALL MEDS BY PRESCRIBER/CLIN PHARMACIST DOCUMENTED: ICD-10-PCS | Mod: CPTII,S$GLB,, | Performed by: FAMILY MEDICINE

## 2021-07-27 RX ORDER — AMLODIPINE BESYLATE 10 MG/1
10 TABLET ORAL DAILY
Qty: 90 TABLET | Refills: 3 | Status: SHIPPED | OUTPATIENT
Start: 2021-07-27 | End: 2021-10-19 | Stop reason: SDUPTHER

## 2021-07-27 RX ORDER — VALSARTAN 320 MG/1
320 TABLET ORAL DAILY
Qty: 90 TABLET | Refills: 3 | Status: SHIPPED | OUTPATIENT
Start: 2021-07-27 | End: 2022-06-12

## 2021-07-27 RX ORDER — TADALAFIL 20 MG/1
20 TABLET ORAL DAILY PRN
Qty: 30 TABLET | Refills: 2 | Status: SHIPPED | OUTPATIENT
Start: 2021-07-27 | End: 2022-10-15 | Stop reason: SDUPTHER

## 2021-07-27 RX ORDER — NYSTATIN AND TRIAMCINOLONE ACETONIDE 100000; 1 [USP'U]/G; MG/G
CREAM TOPICAL 2 TIMES DAILY
Qty: 30 G | Refills: 0 | Status: SHIPPED | OUTPATIENT
Start: 2021-07-27 | End: 2024-01-19

## 2021-07-28 LAB
ESTIMATED AVG GLUCOSE: 103 MG/DL (ref 68–131)
HBA1C MFR BLD: 5.2 % (ref 4–5.6)

## 2021-07-29 ENCOUNTER — CLINICAL SUPPORT (OUTPATIENT)
Dept: URGENT CARE | Facility: CLINIC | Age: 46
End: 2021-07-29
Payer: COMMERCIAL

## 2021-07-29 DIAGNOSIS — Z11.59 ENCOUNTER FOR SCREENING FOR OTHER VIRAL DISEASES: Primary | ICD-10-CM

## 2021-07-29 LAB
CTP QC/QA: YES
SARS-COV-2 RDRP RESP QL NAA+PROBE: NEGATIVE

## 2021-07-29 PROCEDURE — U0002 COVID-19 LAB TEST NON-CDC: HCPCS | Mod: QW,S$GLB,, | Performed by: NURSE PRACTITIONER

## 2021-07-29 PROCEDURE — U0002: ICD-10-PCS | Mod: QW,S$GLB,, | Performed by: NURSE PRACTITIONER

## 2021-08-11 ENCOUNTER — PATIENT MESSAGE (OUTPATIENT)
Dept: INTERNAL MEDICINE | Facility: CLINIC | Age: 46
End: 2021-08-11

## 2021-08-11 RX ORDER — PERMETHRIN 50 MG/G
CREAM TOPICAL ONCE
Qty: 60 G | Refills: 0 | Status: SHIPPED | OUTPATIENT
Start: 2021-08-11 | End: 2021-08-11

## 2021-10-19 DIAGNOSIS — I10 HYPERTENSION, ESSENTIAL: ICD-10-CM

## 2021-10-20 RX ORDER — AMLODIPINE BESYLATE 10 MG/1
10 TABLET ORAL DAILY
Qty: 90 TABLET | Refills: 3 | Status: SHIPPED | OUTPATIENT
Start: 2021-10-20 | End: 2022-09-28 | Stop reason: SDUPTHER

## 2021-11-24 ENCOUNTER — IMMUNIZATION (OUTPATIENT)
Dept: INTERNAL MEDICINE | Facility: CLINIC | Age: 46
End: 2021-11-24
Payer: COMMERCIAL

## 2021-11-24 DIAGNOSIS — Z23 NEED FOR VACCINATION: Primary | ICD-10-CM

## 2021-11-24 PROCEDURE — 0064A COVID-19, MRNA, LNP-S, PF, 100 MCG/0.25 ML DOSE VACCINE (MODERNA BOOSTER): CPT | Mod: CV19,PBBFAC | Performed by: INTERNAL MEDICINE

## 2022-03-18 ENCOUNTER — PATIENT MESSAGE (OUTPATIENT)
Dept: ADMINISTRATIVE | Facility: HOSPITAL | Age: 47
End: 2022-03-18
Payer: COMMERCIAL

## 2022-06-11 DIAGNOSIS — I10 HYPERTENSION, ESSENTIAL: ICD-10-CM

## 2022-06-11 NOTE — TELEPHONE ENCOUNTER
Care Due:                  Date            Visit Type   Department     Provider  --------------------------------------------------------------------------------                                MYCHART                              ANNUAL                              CHECKUP/PHY  [facility] INTERNAL  Last Visit: 07-      [facility]       KULDIP BARCENAS  Next Visit: None Scheduled  None         None Found                                                            Last  Test          Frequency    Reason                     Performed    Due Date  --------------------------------------------------------------------------------    Office Visit  12 months..  amLODIPine, tadalafiL,     07- 07-                             valsartan................    CMP.........  12 months..  valsartan................  07- 07-    [facility] Embedded Care Gaps. Reference number: [id number]. 6/11/2022   10:07:02 AM CDT

## 2022-06-12 RX ORDER — VALSARTAN 320 MG/1
320 TABLET ORAL DAILY
Qty: 90 TABLET | Refills: 0 | Status: SHIPPED | OUTPATIENT
Start: 2022-06-12 | End: 2022-09-02 | Stop reason: SDUPTHER

## 2022-06-12 NOTE — TELEPHONE ENCOUNTER
Refill Authorization Note   Sarkis Saez  is requesting a refill authorization.  Brief Assessment and Rationale for Refill:  Approve    -Medication-Related Problems Identified:   Requires labs  Requires appointment  Medication Therapy Plan:  TERRANCE.LABS(CMP) LOV 07/27/21    Medication Reconciliation Completed: No   Comments:     Provider Staff:     Action is required for this patient.   Please see care gap opportunities below in Care Due Message.     Thanks!  [facility]     Appointments      Date Provider   Last Visit   7/27/2021 Dave Hansen MD   Next Visit   Visit date not found Dave Hansen MD     Note composed:3:49 PM 06/12/2022           Note composed:3:49 PM 06/12/2022

## 2022-06-21 ENCOUNTER — PATIENT MESSAGE (OUTPATIENT)
Dept: INTERNAL MEDICINE | Facility: CLINIC | Age: 47
End: 2022-06-21
Payer: COMMERCIAL

## 2022-06-21 DIAGNOSIS — G47.33 OSA (OBSTRUCTIVE SLEEP APNEA): Primary | ICD-10-CM

## 2022-07-08 ENCOUNTER — OFFICE VISIT (OUTPATIENT)
Dept: SLEEP MEDICINE | Facility: CLINIC | Age: 47
End: 2022-07-08
Payer: COMMERCIAL

## 2022-07-08 VITALS
DIASTOLIC BLOOD PRESSURE: 75 MMHG | WEIGHT: 315 LBS | HEIGHT: 72 IN | BODY MASS INDEX: 42.66 KG/M2 | SYSTOLIC BLOOD PRESSURE: 139 MMHG | HEART RATE: 99 BPM

## 2022-07-08 DIAGNOSIS — G47.33 OSA (OBSTRUCTIVE SLEEP APNEA): ICD-10-CM

## 2022-07-08 DIAGNOSIS — I10 HYPERTENSION, ESSENTIAL: Primary | ICD-10-CM

## 2022-07-08 DIAGNOSIS — G47.30 SLEEP APNEA, UNSPECIFIED TYPE: ICD-10-CM

## 2022-07-08 PROCEDURE — 3078F PR MOST RECENT DIASTOLIC BLOOD PRESSURE < 80 MM HG: ICD-10-PCS | Mod: CPTII,S$GLB,, | Performed by: NURSE PRACTITIONER

## 2022-07-08 PROCEDURE — 3075F SYST BP GE 130 - 139MM HG: CPT | Mod: CPTII,S$GLB,, | Performed by: NURSE PRACTITIONER

## 2022-07-08 PROCEDURE — 3008F PR BODY MASS INDEX (BMI) DOCUMENTED: ICD-10-PCS | Mod: CPTII,S$GLB,, | Performed by: NURSE PRACTITIONER

## 2022-07-08 PROCEDURE — 3008F BODY MASS INDEX DOCD: CPT | Mod: CPTII,S$GLB,, | Performed by: NURSE PRACTITIONER

## 2022-07-08 PROCEDURE — 1159F MED LIST DOCD IN RCRD: CPT | Mod: CPTII,S$GLB,, | Performed by: NURSE PRACTITIONER

## 2022-07-08 PROCEDURE — 99999 PR PBB SHADOW E&M-EST. PATIENT-LVL III: CPT | Mod: PBBFAC,,, | Performed by: NURSE PRACTITIONER

## 2022-07-08 PROCEDURE — 4010F PR ACE/ARB THEARPY RXD/TAKEN: ICD-10-PCS | Mod: CPTII,S$GLB,, | Performed by: NURSE PRACTITIONER

## 2022-07-08 PROCEDURE — 3078F DIAST BP <80 MM HG: CPT | Mod: CPTII,S$GLB,, | Performed by: NURSE PRACTITIONER

## 2022-07-08 PROCEDURE — 99204 PR OFFICE/OUTPT VISIT, NEW, LEVL IV, 45-59 MIN: ICD-10-PCS | Mod: S$GLB,,, | Performed by: NURSE PRACTITIONER

## 2022-07-08 PROCEDURE — 1159F PR MEDICATION LIST DOCUMENTED IN MEDICAL RECORD: ICD-10-PCS | Mod: CPTII,S$GLB,, | Performed by: NURSE PRACTITIONER

## 2022-07-08 PROCEDURE — 3075F PR MOST RECENT SYSTOLIC BLOOD PRESS GE 130-139MM HG: ICD-10-PCS | Mod: CPTII,S$GLB,, | Performed by: NURSE PRACTITIONER

## 2022-07-08 PROCEDURE — 99999 PR PBB SHADOW E&M-EST. PATIENT-LVL III: ICD-10-PCS | Mod: PBBFAC,,, | Performed by: NURSE PRACTITIONER

## 2022-07-08 PROCEDURE — 99204 OFFICE O/P NEW MOD 45 MIN: CPT | Mod: S$GLB,,, | Performed by: NURSE PRACTITIONER

## 2022-07-08 PROCEDURE — 4010F ACE/ARB THERAPY RXD/TAKEN: CPT | Mod: CPTII,S$GLB,, | Performed by: NURSE PRACTITIONER

## 2022-07-08 NOTE — PROGRESS NOTES
"Referred by Dr. Angulo  CHIEF COMPLAINT: Snoring, excessive daytime sleepiness    HISTORY OF PRESENT ILLNESS:Last seen in sleep clinic 2019 but PSG denied then never had home study done. Ongoing snoring which can awaken himself and be loud. Remote witnessed apneic pauses. Starts sleep on his side. Nocturia few times per night. Never been a good sleeper. Sleep is not super refreshing. Allows only ~ 6h sleep time. /moving to new place this weekend      On todays [place] Sleepiness Scale the patient scores a 10/24.     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 Warrants further investigation for untreated sleep apnea.     PLAN:   1. Home Sleep Study, discussed plan of care (call results, if+ plan apap/nose with adherence monitoring)  2. Discussed etiology of LEE and potential ramifications of untreated LEE, including stroke, heart disease, HTN.  We discussed potential treatment options, which could include weight loss (10-15%), continuous positive airway pressure (CPAP-definitive), mandibular advancement splint by dentist  3 see pcp re: HTN mgt/continue meds      Thank you for allowing me the opportunity to participate in the care of your patient      "

## 2022-07-12 ENCOUNTER — PATIENT MESSAGE (OUTPATIENT)
Dept: SLEEP MEDICINE | Facility: CLINIC | Age: 47
End: 2022-07-12
Payer: COMMERCIAL

## 2022-07-25 ENCOUNTER — PATIENT MESSAGE (OUTPATIENT)
Dept: SLEEP MEDICINE | Facility: CLINIC | Age: 47
End: 2022-07-25
Payer: COMMERCIAL

## 2022-07-26 ENCOUNTER — TELEPHONE (OUTPATIENT)
Dept: SLEEP MEDICINE | Facility: CLINIC | Age: 47
End: 2022-07-26
Payer: COMMERCIAL

## 2022-07-26 DIAGNOSIS — G47.30 SLEEP APNEA, UNSPECIFIED TYPE: Primary | ICD-10-CM

## 2022-07-27 ENCOUNTER — PATIENT MESSAGE (OUTPATIENT)
Dept: SLEEP MEDICINE | Facility: CLINIC | Age: 47
End: 2022-07-27
Payer: COMMERCIAL

## 2022-08-17 ENCOUNTER — TELEPHONE (OUTPATIENT)
Dept: SLEEP MEDICINE | Facility: OTHER | Age: 47
End: 2022-08-17
Payer: COMMERCIAL

## 2022-08-31 ENCOUNTER — TELEPHONE (OUTPATIENT)
Dept: SLEEP MEDICINE | Facility: OTHER | Age: 47
End: 2022-08-31
Payer: COMMERCIAL

## 2022-09-20 ENCOUNTER — TELEPHONE (OUTPATIENT)
Dept: SLEEP MEDICINE | Facility: OTHER | Age: 47
End: 2022-09-20
Payer: COMMERCIAL

## 2022-09-22 ENCOUNTER — PATIENT MESSAGE (OUTPATIENT)
Dept: INTERNAL MEDICINE | Facility: CLINIC | Age: 47
End: 2022-09-22
Payer: COMMERCIAL

## 2022-09-26 ENCOUNTER — TELEPHONE (OUTPATIENT)
Dept: SLEEP MEDICINE | Facility: OTHER | Age: 47
End: 2022-09-26
Payer: COMMERCIAL

## 2022-09-26 NOTE — TELEPHONE ENCOUNTER
Left messages to schedule his home sleep study,no response.  Sent out a message through my chart to schedule.

## 2022-09-28 ENCOUNTER — OFFICE VISIT (OUTPATIENT)
Dept: INTERNAL MEDICINE | Facility: CLINIC | Age: 47
End: 2022-09-28
Attending: FAMILY MEDICINE
Payer: COMMERCIAL

## 2022-09-28 ENCOUNTER — LAB VISIT (OUTPATIENT)
Dept: LAB | Facility: HOSPITAL | Age: 47
End: 2022-09-28
Attending: FAMILY MEDICINE
Payer: COMMERCIAL

## 2022-09-28 VITALS
OXYGEN SATURATION: 96 % | DIASTOLIC BLOOD PRESSURE: 65 MMHG | HEART RATE: 92 BPM | WEIGHT: 315 LBS | SYSTOLIC BLOOD PRESSURE: 122 MMHG | BODY MASS INDEX: 42.66 KG/M2 | HEIGHT: 72 IN

## 2022-09-28 DIAGNOSIS — F41.9 ANXIETY: ICD-10-CM

## 2022-09-28 DIAGNOSIS — Z12.11 COLON CANCER SCREENING: ICD-10-CM

## 2022-09-28 DIAGNOSIS — I10 HYPERTENSION, ESSENTIAL: ICD-10-CM

## 2022-09-28 DIAGNOSIS — Z00.00 ANNUAL PHYSICAL EXAM: Primary | ICD-10-CM

## 2022-09-28 DIAGNOSIS — Z12.5 PROSTATE CANCER SCREENING: ICD-10-CM

## 2022-09-28 DIAGNOSIS — Z00.00 ANNUAL PHYSICAL EXAM: ICD-10-CM

## 2022-09-28 DIAGNOSIS — E66.01 MORBID OBESITY WITH BMI OF 45.0-49.9, ADULT: ICD-10-CM

## 2022-09-28 DIAGNOSIS — E78.5 HYPERLIPIDEMIA, UNSPECIFIED HYPERLIPIDEMIA TYPE: ICD-10-CM

## 2022-09-28 DIAGNOSIS — R06.00 DYSPNEA, UNSPECIFIED TYPE: ICD-10-CM

## 2022-09-28 LAB
ALBUMIN SERPL BCP-MCNC: 4.4 G/DL (ref 3.5–5.2)
ALP SERPL-CCNC: 22 U/L (ref 55–135)
ALT SERPL W/O P-5'-P-CCNC: 52 U/L (ref 10–44)
ANION GAP SERPL CALC-SCNC: 11 MMOL/L (ref 8–16)
AST SERPL-CCNC: 23 U/L (ref 10–40)
BILIRUB SERPL-MCNC: 0.6 MG/DL (ref 0.1–1)
BUN SERPL-MCNC: 9 MG/DL (ref 6–20)
CALCIUM SERPL-MCNC: 10 MG/DL (ref 8.7–10.5)
CHLORIDE SERPL-SCNC: 102 MMOL/L (ref 95–110)
CHOLEST SERPL-MCNC: 215 MG/DL (ref 120–199)
CHOLEST/HDLC SERPL: 5.8 {RATIO} (ref 2–5)
CO2 SERPL-SCNC: 24 MMOL/L (ref 23–29)
COMPLEXED PSA SERPL-MCNC: 1.1 NG/ML (ref 0–4)
CREAT SERPL-MCNC: 0.8 MG/DL (ref 0.5–1.4)
EST. GFR  (NO RACE VARIABLE): >60 ML/MIN/1.73 M^2
GLUCOSE SERPL-MCNC: 93 MG/DL (ref 70–110)
HDLC SERPL-MCNC: 37 MG/DL (ref 40–75)
HDLC SERPL: 17.2 % (ref 20–50)
LDLC SERPL CALC-MCNC: 143.4 MG/DL (ref 63–159)
NONHDLC SERPL-MCNC: 178 MG/DL
POTASSIUM SERPL-SCNC: 4 MMOL/L (ref 3.5–5.1)
PROT SERPL-MCNC: 7.4 G/DL (ref 6–8.4)
SODIUM SERPL-SCNC: 137 MMOL/L (ref 136–145)
TRIGL SERPL-MCNC: 173 MG/DL (ref 30–150)
TSH SERPL DL<=0.005 MIU/L-ACNC: 1.06 UIU/ML (ref 0.4–4)

## 2022-09-28 PROCEDURE — 99396 PREV VISIT EST AGE 40-64: CPT | Mod: S$GLB,,, | Performed by: FAMILY MEDICINE

## 2022-09-28 PROCEDURE — 3008F BODY MASS INDEX DOCD: CPT | Mod: CPTII,S$GLB,, | Performed by: FAMILY MEDICINE

## 2022-09-28 PROCEDURE — 84443 ASSAY THYROID STIM HORMONE: CPT | Performed by: FAMILY MEDICINE

## 2022-09-28 PROCEDURE — 3074F PR MOST RECENT SYSTOLIC BLOOD PRESSURE < 130 MM HG: ICD-10-PCS | Mod: CPTII,S$GLB,, | Performed by: FAMILY MEDICINE

## 2022-09-28 PROCEDURE — 36415 COLL VENOUS BLD VENIPUNCTURE: CPT | Performed by: FAMILY MEDICINE

## 2022-09-28 PROCEDURE — 4010F PR ACE/ARB THEARPY RXD/TAKEN: ICD-10-PCS | Mod: CPTII,S$GLB,, | Performed by: FAMILY MEDICINE

## 2022-09-28 PROCEDURE — 99396 PR PREVENTIVE VISIT,EST,40-64: ICD-10-PCS | Mod: S$GLB,,, | Performed by: FAMILY MEDICINE

## 2022-09-28 PROCEDURE — 80061 LIPID PANEL: CPT | Performed by: FAMILY MEDICINE

## 2022-09-28 PROCEDURE — 80053 COMPREHEN METABOLIC PANEL: CPT | Performed by: FAMILY MEDICINE

## 2022-09-28 PROCEDURE — 84153 ASSAY OF PSA TOTAL: CPT | Performed by: FAMILY MEDICINE

## 2022-09-28 PROCEDURE — 99999 PR PBB SHADOW E&M-EST. PATIENT-LVL V: ICD-10-PCS | Mod: PBBFAC,,, | Performed by: FAMILY MEDICINE

## 2022-09-28 PROCEDURE — 99999 PR PBB SHADOW E&M-EST. PATIENT-LVL V: CPT | Mod: PBBFAC,,, | Performed by: FAMILY MEDICINE

## 2022-09-28 PROCEDURE — 3074F SYST BP LT 130 MM HG: CPT | Mod: CPTII,S$GLB,, | Performed by: FAMILY MEDICINE

## 2022-09-28 PROCEDURE — 1159F PR MEDICATION LIST DOCUMENTED IN MEDICAL RECORD: ICD-10-PCS | Mod: CPTII,S$GLB,, | Performed by: FAMILY MEDICINE

## 2022-09-28 PROCEDURE — 3078F DIAST BP <80 MM HG: CPT | Mod: CPTII,S$GLB,, | Performed by: FAMILY MEDICINE

## 2022-09-28 PROCEDURE — 1159F MED LIST DOCD IN RCRD: CPT | Mod: CPTII,S$GLB,, | Performed by: FAMILY MEDICINE

## 2022-09-28 PROCEDURE — 1160F RVW MEDS BY RX/DR IN RCRD: CPT | Mod: CPTII,S$GLB,, | Performed by: FAMILY MEDICINE

## 2022-09-28 PROCEDURE — 1160F PR REVIEW ALL MEDS BY PRESCRIBER/CLIN PHARMACIST DOCUMENTED: ICD-10-PCS | Mod: CPTII,S$GLB,, | Performed by: FAMILY MEDICINE

## 2022-09-28 PROCEDURE — 3008F PR BODY MASS INDEX (BMI) DOCUMENTED: ICD-10-PCS | Mod: CPTII,S$GLB,, | Performed by: FAMILY MEDICINE

## 2022-09-28 PROCEDURE — 4010F ACE/ARB THERAPY RXD/TAKEN: CPT | Mod: CPTII,S$GLB,, | Performed by: FAMILY MEDICINE

## 2022-09-28 PROCEDURE — 3078F PR MOST RECENT DIASTOLIC BLOOD PRESSURE < 80 MM HG: ICD-10-PCS | Mod: CPTII,S$GLB,, | Performed by: FAMILY MEDICINE

## 2022-09-28 RX ORDER — AMLODIPINE BESYLATE 10 MG/1
10 TABLET ORAL DAILY
Qty: 90 TABLET | Refills: 3 | Status: SHIPPED | OUTPATIENT
Start: 2022-09-28 | End: 2023-07-14

## 2022-09-28 RX ORDER — VALSARTAN 320 MG/1
320 TABLET ORAL DAILY
Qty: 90 TABLET | Refills: 0 | Status: SHIPPED | OUTPATIENT
Start: 2022-09-28 | End: 2023-01-04 | Stop reason: SDUPTHER

## 2022-09-28 NOTE — PATIENT INSTRUCTIONS
Schedule lab orders for today.      If not contacted in a couple weeks by colonoscopy scheduling department - call Colonoscopy Scheduling Number - [phone].     Information about cholesterol, high blood pressure and healthy diet and activity recommendations can be found at the following links on the Internet:    http://www.nhlbi.nih.gov/health/health-topics/topics/hbc  http://www.nhlbi.nih.gov/health/educational/lose_wt/index.htm  Http://www.nhlbi.nih.gov/files/docs/public/heart/hbp_low.pdf  http://www.heart.org/HEARTORG/  http://diabetes.org/  https://www.cdc.gov/  Https://healthfinder.gov/  https://health.gov/dietaryguidelines/2015/guidelines/  https://health.gov/paguidelines/second-edition/pdf/Physical_Activity_Guidelines_2nd_edition.pdf

## 2022-09-28 NOTE — PROGRESS NOTES
Subjective:       Patient ID: Sarkis Saez Jr. is a 47 y.o. male.    Chief Complaint: Annual Exam, Obesity, and Hypertension    Established patient for an annual wellness check/physical exam and also chronic disease management. Specific complaints - see dictation, M*model entries and please see ROS.  Past, Surgical, Family, Social Histories; Medications, Allergies reviewed and reconciled.  Health maintenance file reviewed and addressed items due. Recent applicable lab, imaging and cardiovascular results reviewed.  Problem list items reviewed and modified or added entries (in the overview section) may not be transcribed into this encounter note due to note writer format.    ER visit in [place] while with a school group, juan manuel.  Developed shortness of breath.  ER physician was part of the group and send him to the emergency room.  States he had EKG chest x-ray and laboratory.  No results are available.  Was placed on isosorbide.  He has not taken that yet.  No chest pain was reported.    Describes some recent weight gain.  He had lost in the past but now bit higher.  Not exercising on a regular basis.  Significantly stressed, going through divorce now, has been  with wife for a few months.  States that she wanted to move on, he did not.  Sees is daughter every other week.  He does have interaction with an individual counselor.  Had been marital counseling in the past.    Review of Systems   Constitutional:  Negative for appetite change, chills, diaphoresis, fatigue and fever.   HENT:  Negative for congestion, postnasal drip, rhinorrhea, sore throat and trouble swallowing.    Eyes:  Negative for visual disturbance.   Respiratory:  Positive for shortness of breath. Negative for cough, choking, chest tightness and wheezing.    Cardiovascular:  Negative for chest pain and leg swelling.   Gastrointestinal:  Negative for abdominal distention, abdominal pain, diarrhea, nausea and vomiting.   Genitourinary:   Negative for difficulty urinating and hematuria.   Musculoskeletal:  Negative for arthralgias and myalgias.   Skin:  Negative for rash.   Neurological:  Negative for weakness, light-headedness and headaches.   Psychiatric/Behavioral:  Positive for dysphoric mood. Negative for confusion. The patient is nervous/anxious.      Objective:      Physical Exam  Vitals and nursing note reviewed.   Constitutional:       Appearance: He is well-developed. He is obese. He is not diaphoretic.   HENT:      Head: Normocephalic and atraumatic.   Eyes:      General: No scleral icterus.     Conjunctiva/sclera: Conjunctivae normal.   Neck:      Vascular: No carotid bruit.   Cardiovascular:      Rate and Rhythm: Normal rate and regular rhythm.      Heart sounds: Normal heart sounds. No murmur heard.    No friction rub. No gallop.   Pulmonary:      Effort: Pulmonary effort is normal. No respiratory distress.      Breath sounds: Normal breath sounds. No wheezing or rales.   Abdominal:      General: There is no distension.      Tenderness: There is no abdominal tenderness.   Musculoskeletal:         General: No deformity.      Cervical back: Normal range of motion and neck supple.   Skin:     General: Skin is warm and dry.      Findings: No erythema or rash.   Neurological:      Mental Status: He is alert and oriented to person, place, and time.      Cranial Nerves: No cranial nerve deficit.      Motor: No tremor.      Coordination: Coordination normal.      Gait: Gait normal.   Psychiatric:         Behavior: Behavior normal.         Thought Content: Thought content normal.         Judgment: Judgment normal.       Assessment:       1. Annual physical exam    2. Hypertension, essential    3. Colon cancer screening    4. Hyperlipidemia, unspecified hyperlipidemia type    5. Dyspnea, unspecified type    6. Anxiety    7. Prostate cancer screening    8. Morbid obesity with BMI of 45.0-49.9, adult          Plan:     Medication List with  Changes/Refills   Current Medications    ASPIRIN (ECOTRIN) 81 MG EC TABLET    Take 81 mg by mouth once daily.    CLONAZEPAM (KLONOPIN) 0.5 MG TABLET    Take 0.25 mg by mouth once daily.    CLONAZEPAM (KLONOPIN) 0.5 MG TABLET    Take 1 tablet (0.5 mg total) by mouth 2 (two) times a day.    DULOXETINE (CYMBALTA) 30 MG CAPSULE    Take 30 mg by mouth once daily.    DULOXETINE (CYMBALTA) 60 MG CAPSULE    Take 90 mg by mouth once daily.    MULTIVITAMIN CAPSULE    Take 1 capsule by mouth once daily.    NYSTATIN-TRIAMCINOLONE (MYCOLOG II) CREAM    Apply topically 2 (two) times daily.    TADALAFIL (CIALIS) 20 MG TAB    Take 1 tablet (20 mg total) by mouth daily as needed.   Changed and/or Refilled Medications    Modified Medication Previous Medication    AMLODIPINE (NORVASC) 10 MG TABLET amLODIPine (NORVASC) 10 MG tablet       Take 1 tablet (10 mg total) by mouth once daily.    Take 1 tablet (10 mg total) by mouth once daily.    VALSARTAN (DIOVAN) 320 MG TABLET valsartan (DIOVAN) 320 MG tablet       Take 1 tablet (320 mg total) by mouth once daily.    Take 1 tablet (320 mg total) by mouth once daily.     1. Annual physical exam  -     Comprehensive Metabolic Panel  -     Lipid Panel  -     PSA, Screening  -     TSH    2. Hypertension, essential  -     Comprehensive Metabolic Panel  -     amLODIPine (NORVASC) 10 MG tablet  -     valsartan (DIOVAN) 320 MG tablet    3. Colon cancer screening  -     Ambulatory referral/consult to Endo Procedure     4. Hyperlipidemia, unspecified hyperlipidemia type  -     Lipid Panel    5. Dyspnea, unspecified type  -     EKG 12-lead  -     Exercise Stress - EKG    6. Anxiety    7. Prostate cancer screening  -     PSA, Screening    8. Morbid obesity with BMI of 45.0-49.9, adult  -     Ambulatory referral/consult to Bariatric Medicine  -     Ambulatory referral/consult to Sleep Disorders  -     TSH    See meds, orders, follow up, routing and instructions sections of encounter and AVS.  Discussed with patient and provided on AVS.    Discussed diet and exercise and links provided on AVS for detailed information.    Lab Results   Component Value Date     07/27/2021    K 4.5 07/27/2021     07/27/2021    BUN 11 07/27/2021    CREATININE 0.9 07/27/2021     07/27/2021    HGBA1C 5.2 07/27/2021    AST 18 07/27/2021    ALT 31 07/27/2021    ALBUMIN 4.2 07/27/2021    PROT 7.7 07/27/2021    BILITOT 0.8 07/27/2021    CHOL 216 (H) 07/27/2021    HDL 41 07/27/2021    LDLCALC 140.4 07/27/2021    TRIG 173 (H) 07/27/2021    WBC 10.42 04/30/2019    HGB 15.5 04/30/2019    HCT 45.5 04/30/2019     (H) 04/30/2019    TSH 1.102 04/30/2019

## 2022-09-29 ENCOUNTER — TELEPHONE (OUTPATIENT)
Dept: SLEEP MEDICINE | Facility: OTHER | Age: 47
End: 2022-09-29
Payer: COMMERCIAL

## 2022-10-04 ENCOUNTER — HOSPITAL ENCOUNTER (OUTPATIENT)
Dept: CARDIOLOGY | Facility: CLINIC | Age: 47
Discharge: HOME | End: 2022-10-04
Attending: FAMILY MEDICINE
Payer: COMMERCIAL

## 2022-10-04 DIAGNOSIS — R06.00 DYSPNEA, UNSPECIFIED TYPE: ICD-10-CM

## 2022-10-04 PROCEDURE — 93010 EKG 12-LEAD: ICD-10-PCS | Mod: S$GLB,,, | Performed by: INTERNAL MEDICINE

## 2022-10-04 PROCEDURE — 93005 ELECTROCARDIOGRAM TRACING: CPT | Mod: S$GLB,,, | Performed by: FAMILY MEDICINE

## 2022-10-04 PROCEDURE — 93005 EKG 12-LEAD: ICD-10-PCS | Mod: S$GLB,,, | Performed by: FAMILY MEDICINE

## 2022-10-04 PROCEDURE — 93010 ELECTROCARDIOGRAM REPORT: CPT | Mod: S$GLB,,, | Performed by: INTERNAL MEDICINE

## 2022-10-06 ENCOUNTER — HOSPITAL ENCOUNTER (OUTPATIENT)
Dept: SLEEP MEDICINE | Facility: OTHER | Age: 47
Discharge: HOME | End: 2022-10-06
Attending: NURSE PRACTITIONER
Payer: COMMERCIAL

## 2022-10-06 DIAGNOSIS — G47.30 SLEEP APNEA, UNSPECIFIED TYPE: ICD-10-CM

## 2022-10-06 DIAGNOSIS — G47.33 OSA (OBSTRUCTIVE SLEEP APNEA): Primary | ICD-10-CM

## 2022-10-06 PROCEDURE — 95806 SLEEP STUDY UNATT&RESP EFFT: CPT | Mod: 26,,, | Performed by: PSYCHIATRY & NEUROLOGY

## 2022-10-06 PROCEDURE — 95806 PR SLEEP STUDY, UNATTENDED, SIMUL RECORD HR/O2 SAT/RESP FLOW/RESP EFFT: ICD-10-PCS | Mod: 26,,, | Performed by: PSYCHIATRY & NEUROLOGY

## 2022-10-06 PROCEDURE — 95800 SLP STDY UNATTENDED: CPT

## 2022-10-07 PROBLEM — G47.30 SLEEP APNEA: Status: ACTIVE | Noted: 2022-10-07

## 2022-10-10 ENCOUNTER — HOSPITAL ENCOUNTER (OUTPATIENT)
Dept: CARDIOLOGY | Facility: HOSPITAL | Age: 47
Discharge: HOME | End: 2022-10-10
Attending: FAMILY MEDICINE
Payer: COMMERCIAL

## 2022-10-10 ENCOUNTER — PATIENT MESSAGE (OUTPATIENT)
Dept: INTERNAL MEDICINE | Facility: CLINIC | Age: 47
End: 2022-10-10
Payer: COMMERCIAL

## 2022-10-10 VITALS — HEIGHT: 71 IN | BODY MASS INDEX: 44.1 KG/M2 | WEIGHT: 315 LBS

## 2022-10-10 DIAGNOSIS — R06.00 DYSPNEA, UNSPECIFIED TYPE: ICD-10-CM

## 2022-10-10 LAB
CV STRESS BASE HR: 83 BPM
DIASTOLIC BLOOD PRESSURE: 88 MMHG
OHS CV CPX 1 MINUTE RECOVERY HEART RATE: 139 BPM
OHS CV CPX 85 PERCENT MAX PREDICTED HEART RATE MALE: 145
OHS CV CPX ESTIMATED METS: 9
OHS CV CPX MAX PREDICTED HEART RATE: 171
OHS CV CPX PATIENT IS FEMALE: 0
OHS CV CPX PATIENT IS MALE: 1
OHS CV CPX PEAK DIASTOLIC BLOOD PRESSURE: 96 MMHG
OHS CV CPX PEAK HEAR RATE: 153 BPM
OHS CV CPX PEAK RATE PRESSURE PRODUCT: NORMAL
OHS CV CPX PEAK SYSTOLIC BLOOD PRESSURE: 192 MMHG
OHS CV CPX PERCENT MAX PREDICTED HEART RATE ACHIEVED: 89
OHS CV CPX RATE PRESSURE PRODUCT PRESENTING: NORMAL
STRESS ECHO POST EXERCISE DUR MIN: 5 MINUTES
STRESS ECHO POST EXERCISE DUR SEC: 31 SECONDS
SYSTOLIC BLOOD PRESSURE: 155 MMHG

## 2022-10-10 PROCEDURE — 93018 EXERCISE STRESS - EKG (CUPID ONLY): ICD-10-PCS | Mod: ,,, | Performed by: INTERNAL MEDICINE

## 2022-10-10 PROCEDURE — 93018 CV STRESS TEST I&R ONLY: CPT | Mod: ,,, | Performed by: INTERNAL MEDICINE

## 2022-10-10 PROCEDURE — 93016 EXERCISE STRESS - EKG (CUPID ONLY): ICD-10-PCS | Mod: ,,, | Performed by: INTERNAL MEDICINE

## 2022-10-10 PROCEDURE — 93016 CV STRESS TEST SUPVJ ONLY: CPT | Mod: ,,, | Performed by: INTERNAL MEDICINE

## 2022-10-10 PROCEDURE — 93017 CV STRESS TEST TRACING ONLY: CPT

## 2022-10-11 ENCOUNTER — PATIENT MESSAGE (OUTPATIENT)
Dept: ADMINISTRATIVE | Facility: HOSPITAL | Age: 47
End: 2022-10-11
Payer: COMMERCIAL

## 2022-10-11 ENCOUNTER — PATIENT OUTREACH (OUTPATIENT)
Dept: ADMINISTRATIVE | Facility: HOSPITAL | Age: 47
End: 2022-10-11
Payer: COMMERCIAL

## 2022-10-13 ENCOUNTER — PATIENT MESSAGE (OUTPATIENT)
Dept: SLEEP MEDICINE | Facility: CLINIC | Age: 47
End: 2022-10-13
Payer: COMMERCIAL

## 2022-10-13 DIAGNOSIS — G47.33 OSA (OBSTRUCTIVE SLEEP APNEA): Primary | ICD-10-CM

## 2022-10-13 NOTE — PROCEDURES
PHYSICIAN INTERPRETATION AND COMMENTS: Findings are consistent with severe, obstructive sleep apnea (LEE) (G47.33),  by overall AHI (apnea hypopnea index). 2. There is elevated concern for sleep related hypoxemia or hypoventilation, based  on finding of large %time SpO2 <90%. This study was technically adequate to allow for interpretation.  CLINICAL HISTORY: 47 year old male presented with: 21 inch neck, BMI of 45.3, an Critz sleepiness score of 9, history of  hypertension, depression and symptoms of nocturnal snoring and witnessed apneas. Based on the clinical history, the  patient has a high pre-test probability of having Severe LEE.  SLEEP STUDY FINDINGS: Patient underwent a 1 night Home Sleep Test and by behavioral criteria, slept for approximately  6.3 hours, with a sleep latency of 9 minutes and a sleep efficiency of 91.3%. Very Severe sleep disordered breathing (AHI=79)  is noted based on a 4% hypopnea desaturation criteria. The patient slept supine 61.3% of the night based on valid recording  time of 6.3 hours. The apneas/hypopneas are accompanied by severe oxygen desaturation (percent time below 90% SpO2:  31.9%, Min SpO2: 62.8%). The average desaturation across all sleep disordered breathing events is 7.3%. The mean pulse  rate is 64.4 BPM, with very frequent pulse rate variability (104 events with >= 6 BPM increase/decrease per hour).  TREATMENT CONSIDERATIONS: Consider nasal continuous positive airway pressure (CPAP) as the initial treatment choice  for severe obstructive sleep apnea. Consider an attended in-lab CPAP titration study, given the possibility of co-morbid  sleep related hypoventilation. If CPAP trial is unsuccessful, refer to Sleep Clinic for further evaluation and management.  DISEASE MANAGEMENT CONSIDERATIONS: Definitive treatment for LEE is recommended. Consider Sleep Clinic referral for  LEE management.  Signature:

## 2022-11-14 ENCOUNTER — TELEPHONE (OUTPATIENT)
Dept: BARIATRICS | Facility: CLINIC | Age: 47
End: 2022-11-14
Payer: COMMERCIAL

## 2022-11-21 ENCOUNTER — TELEPHONE (OUTPATIENT)
Dept: BARIATRICS | Facility: CLINIC | Age: 47
End: 2022-11-21
Payer: COMMERCIAL

## 2022-11-21 NOTE — TELEPHONE ENCOUNTER
----- Message from Heidi Swan sent at 11/21/2022  9:29 AM CST -----  Regarding: Appt  Contact: pt @ [phone]  Pt is calling to get appt, asking for a call back

## 2022-11-21 NOTE — TELEPHONE ENCOUNTER
Called and spoke to pt to schedule medical weight loss appt. Pt became upset when offered first available of 3/6/23 at 1:30pm. Told pt he would be placed on the wait list in case a sooner appt becomes available. Pt asked if he could get in sooner because he wife works for [employer]. Told pt that wouldn't get him in sooner. Told pt both doctors are booked until March. Pt accepted the appt.       ----- Message from Heidi Swan sent at 11/21/2022  9:29 AM CST -----  Regarding: Appt  Contact: pt @ [phone]  Pt is calling to get appt, asking for a call back

## 2022-12-28 ENCOUNTER — OFFICE VISIT (OUTPATIENT)
Dept: DERMATOLOGY | Facility: CLINIC | Age: 47
End: 2022-12-28
Payer: COMMERCIAL

## 2022-12-28 DIAGNOSIS — L82.1 SK (SEBORRHEIC KERATOSIS): Primary | ICD-10-CM

## 2022-12-28 DIAGNOSIS — L73.8 SEBACEOUS HYPERPLASIA: ICD-10-CM

## 2022-12-28 PROCEDURE — 1160F PR REVIEW ALL MEDS BY PRESCRIBER/CLIN PHARMACIST DOCUMENTED: ICD-10-PCS | Mod: CPTII,S$GLB,, | Performed by: DERMATOLOGY

## 2022-12-28 PROCEDURE — 1159F MED LIST DOCD IN RCRD: CPT | Mod: CPTII,S$GLB,, | Performed by: DERMATOLOGY

## 2022-12-28 PROCEDURE — 99202 OFFICE O/P NEW SF 15 MIN: CPT | Mod: S$GLB,,, | Performed by: DERMATOLOGY

## 2022-12-28 PROCEDURE — 4010F PR ACE/ARB THEARPY RXD/TAKEN: ICD-10-PCS | Mod: CPTII,S$GLB,, | Performed by: DERMATOLOGY

## 2022-12-28 PROCEDURE — 4010F ACE/ARB THERAPY RXD/TAKEN: CPT | Mod: CPTII,S$GLB,, | Performed by: DERMATOLOGY

## 2022-12-28 PROCEDURE — 99202 PR OFFICE/OUTPT VISIT, NEW, LEVL II, 15-29 MIN: ICD-10-PCS | Mod: S$GLB,,, | Performed by: DERMATOLOGY

## 2022-12-28 PROCEDURE — 99999 PR PBB SHADOW E&M-EST. PATIENT-LVL III: CPT | Mod: PBBFAC,,, | Performed by: DERMATOLOGY

## 2022-12-28 PROCEDURE — 99999 PR PBB SHADOW E&M-EST. PATIENT-LVL III: ICD-10-PCS | Mod: PBBFAC,,, | Performed by: DERMATOLOGY

## 2022-12-28 PROCEDURE — 1160F RVW MEDS BY RX/DR IN RCRD: CPT | Mod: CPTII,S$GLB,, | Performed by: DERMATOLOGY

## 2022-12-28 PROCEDURE — 1159F PR MEDICATION LIST DOCUMENTED IN MEDICAL RECORD: ICD-10-PCS | Mod: CPTII,S$GLB,, | Performed by: DERMATOLOGY

## 2022-12-28 NOTE — PROGRESS NOTES
Subjective:       Patient ID:  Sarkis Saez Jr. is a 47 y.o. male who presents for   Chief Complaint   Patient presents with    Lesion     Scalp      Lesion - Initial  Affected locations: scalp  Duration: 2 months  Signs / symptoms: bleeding  Severity: mild  Timing: constant  Aggravated by: nothing  Relieving factors/Treatments tried: nothing  Improvement on treatment: no relief      Review of Systems   Constitutional:  Negative for fever, chills and fatigue.   Skin:  Negative for daily sunscreen use, recent sunburn and wears hat.   Hematologic/Lymphatic: Does not bruise/bleed easily.      Objective:    Physical Exam   Constitutional: He appears well-developed and well-nourished. No distress.   Neurological: He is alert and oriented to person, place, and time. He is not disoriented.   Psychiatric: He has a normal mood and affect.   Skin:   Areas Examined (abnormalities noted in diagram):   Head / Face Inspection Performed            Diagram Legend     Erythematous scaling macule/papule c/w actinic keratosis       Vascular papule c/w angioma      Pigmented verrucoid papule/plaque c/w seborrheic keratosis      Yellow umbilicated papule c/w sebaceous hyperplasia      Irregularly shaped tan macule c/w lentigo     1-2 mm smooth white papules consistent with Milia      Movable subcutaneous cyst with punctum c/w epidermal inclusion cyst      Subcutaneous movable cyst c/w pilar cyst      Firm pink to brown papule c/w dermatofibroma      Pedunculated fleshy papule(s) c/w skin tag(s)      Evenly pigmented macule c/w junctional nevus     Mildly variegated pigmented, slightly irregular-bordered macule c/w mildly atypical nevus      Flesh colored to evenly pigmented papule c/w intradermal nevus       Pink pearly papule/plaque c/w basal cell carcinoma      Erythematous hyperkeratotic cursted plaque c/w SCC      Surgical scar with no sign of skin cancer recurrence      Open and closed comedones      Inflammatory papules and  pustules      Verrucoid papule consistent consistent with wart     Erythematous eczematous patches and plaques     Dystrophic onycholytic nail with subungual debris c/w onychomycosis     Umbilicated papule    Erythematous-base heme-crusted tan verrucoid plaque consistent with inflamed seborrheic keratosis     Erythematous Silvery Scaling Plaque c/w Psoriasis     See annotation      Assessment / Plan:        SK (seborrheic keratosis)  Discussed with patient the benign nature of these lesions and that no treatment is indicated.  Chronic nature of this condition discussed with patient.  Previous [facility] labs and or records and notes reviewed and considered for their impact on our clinical decision making today.    Sebaceous hyperplasia  Chronic nature of this condition discussed with patient.  Discussed with patient the benign nature of these lesions and that no treatment is indicated.  Prn cosmetic hyfrecation of many seb hypers on the face.  Costs $500.  Scar and recurrence reviewed.               Follow up if symptoms worsen or fail to improve, for Procedure.

## 2023-02-07 ENCOUNTER — OFFICE VISIT (OUTPATIENT)
Dept: SLEEP MEDICINE | Facility: CLINIC | Age: 48
End: 2023-02-07
Payer: COMMERCIAL

## 2023-02-07 VITALS
BODY MASS INDEX: 44.1 KG/M2 | HEIGHT: 71 IN | DIASTOLIC BLOOD PRESSURE: 88 MMHG | WEIGHT: 315 LBS | SYSTOLIC BLOOD PRESSURE: 143 MMHG | HEART RATE: 78 BPM

## 2023-02-07 DIAGNOSIS — I10 HYPERTENSION, ESSENTIAL: ICD-10-CM

## 2023-02-07 DIAGNOSIS — G47.30 SLEEP APNEA, UNSPECIFIED TYPE: Primary | ICD-10-CM

## 2023-02-07 PROCEDURE — 3077F PR MOST RECENT SYSTOLIC BLOOD PRESSURE >= 140 MM HG: ICD-10-PCS | Mod: CPTII,S$GLB,, | Performed by: NURSE PRACTITIONER

## 2023-02-07 PROCEDURE — 4010F PR ACE/ARB THEARPY RXD/TAKEN: ICD-10-PCS | Mod: CPTII,S$GLB,, | Performed by: NURSE PRACTITIONER

## 2023-02-07 PROCEDURE — 3008F BODY MASS INDEX DOCD: CPT | Mod: CPTII,S$GLB,, | Performed by: NURSE PRACTITIONER

## 2023-02-07 PROCEDURE — 99999 PR PBB SHADOW E&M-EST. PATIENT-LVL III: ICD-10-PCS | Mod: PBBFAC,,, | Performed by: NURSE PRACTITIONER

## 2023-02-07 PROCEDURE — 1159F PR MEDICATION LIST DOCUMENTED IN MEDICAL RECORD: ICD-10-PCS | Mod: CPTII,S$GLB,, | Performed by: NURSE PRACTITIONER

## 2023-02-07 PROCEDURE — 3008F PR BODY MASS INDEX (BMI) DOCUMENTED: ICD-10-PCS | Mod: CPTII,S$GLB,, | Performed by: NURSE PRACTITIONER

## 2023-02-07 PROCEDURE — 1159F MED LIST DOCD IN RCRD: CPT | Mod: CPTII,S$GLB,, | Performed by: NURSE PRACTITIONER

## 2023-02-07 PROCEDURE — 3079F DIAST BP 80-89 MM HG: CPT | Mod: CPTII,S$GLB,, | Performed by: NURSE PRACTITIONER

## 2023-02-07 PROCEDURE — 99213 PR OFFICE/OUTPT VISIT, EST, LEVL III, 20-29 MIN: ICD-10-PCS | Mod: S$GLB,,, | Performed by: NURSE PRACTITIONER

## 2023-02-07 PROCEDURE — 99213 OFFICE O/P EST LOW 20 MIN: CPT | Mod: S$GLB,,, | Performed by: NURSE PRACTITIONER

## 2023-02-07 PROCEDURE — 99999 PR PBB SHADOW E&M-EST. PATIENT-LVL III: CPT | Mod: PBBFAC,,, | Performed by: NURSE PRACTITIONER

## 2023-02-07 PROCEDURE — 4010F ACE/ARB THERAPY RXD/TAKEN: CPT | Mod: CPTII,S$GLB,, | Performed by: NURSE PRACTITIONER

## 2023-02-07 PROCEDURE — 3079F PR MOST RECENT DIASTOLIC BLOOD PRESSURE 80-89 MM HG: ICD-10-PCS | Mod: CPTII,S$GLB,, | Performed by: NURSE PRACTITIONER

## 2023-02-07 PROCEDURE — 3077F SYST BP >= 140 MM HG: CPT | Mod: CPTII,S$GLB,, | Performed by: NURSE PRACTITIONER

## 2023-02-07 NOTE — PROGRESS NOTES
"Cc: LEE    Since seen he underwent HST and began apap with Luna3G machine. More acclimated to it now then in the beginning. Switched from FFM (too anxiety provoking) to nose mask with memory foam now [id number] prefers. Sometimes falls asleep on couch. Rainout issues lasting ~ 1mo now resolved thankfully. Sleep much less disrupted/more consolidated. Snoring resolved. No chin strap. ESS=9    Interrogation 65d, avg 5h/n AHI 2.0, 90 %tile 10cm. 5l/min leak    Hx 7/2022Last seen in sleep clinic 2019 but PSG denied then never had home study done. Ongoing snoring which can awaken himself and be loud. Remote witnessed apneic pauses. Starts sleep on his side. Nocturia few times per night. Never been a good sleeper. Sleep is not super refreshing. Allows only ~ 6h sleep time. /moving to new place this weekend. 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 Adherent with PAP, benefits from therapy. AHI<5  He has morbid obesity, hypertension      PLAN:   1.continue apap 6-14cm, supplies via DME [facility]  2. Discussed control of LEE and options for treatment when camping (OA online, battery packs). Once wgt loss achieved plan requal HST  3 rtc otherwise 1 yr, sooner if needed  "

## 2023-03-06 ENCOUNTER — PATIENT MESSAGE (OUTPATIENT)
Dept: INTERNAL MEDICINE | Facility: CLINIC | Age: 48
End: 2023-03-06
Payer: COMMERCIAL

## 2023-03-26 ENCOUNTER — NURSE TRIAGE (OUTPATIENT)
Dept: ADMINISTRATIVE | Facility: CLINIC | Age: 48
End: 2023-03-26
Payer: COMMERCIAL

## 2023-03-27 NOTE — TELEPHONE ENCOUNTER
Takes blood pressure medication nightly. Valsartan and amlodipine. Did not take morning meds until around noon. Took Valsartan and Amlodipine by mistake at that time. Went to the pharmacy and spoke with the pharmacist about the medication error. Was advised by pharmacist skip pm dose since the medication was taken early. Calling to make sure that the advice was okay. Advised yes since the medication is daily. Advised to take dose tomorrow a few hours earlier then resume at normal time the following day. Caller verbalized understanding.  Reason for Disposition   Caller has medicine question only, adult not sick, AND triager answers question    Protocols used: Medication Question Call-A-

## 2023-04-21 ENCOUNTER — PATIENT MESSAGE (OUTPATIENT)
Dept: ADMINISTRATIVE | Facility: HOSPITAL | Age: 48
End: 2023-04-21
Payer: COMMERCIAL

## 2023-05-16 NOTE — PROGRESS NOTES
Subjective     Patient ID: Sarkis Saez Jr. is a 48 y.o. male.    Chief Complaint: Consult    CC: Weight     New pt to me, referred by Aaareferral Self  No address on file , with Patient Active Problem List:     Anxiety     Fatty liver     Hypertension, essential     Lumbar disc herniation     Morbid obesity with BMI of 45.0-49.9, adult     Sleep apnea     Lab Results       Component                Value               Date                       HGBA1C                   5.2                 07/27/2021                 HGBA1C                   5.0                 10/10/2013            Lab Results       Component                Value               Date                       LDLCALC                  143.4               09/28/2022                 CREATININE               0.8                 09/28/2022               Lab Results       Component                Value               Date                       ALT                      52 (H)              09/28/2022                 AST                      23                  09/28/2022                 ALKPHOS                  22 (L)              09/28/2022                 BILITOT                  0.6                 09/28/2022                Current attempts at weight loss: has tried to watch what he is eating. Walking 1- 2 times a week.     Previous diet attempts: noom, but did not stick with it.     History of medication for loss:   checked today. Denies.     Heaviest weight: 370#    Lightest weight: 260#    Goal weight: 275#      Last eye exam:  1.5 years ago. No glaucoma per pt.    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 Pt cooks sometimes.   Breakfast: skips. Eggs and toast. Weekends: same.      lunch: sandwich, leftovers. Weekends: same. if out- sandwich and soup, sushi,     dinner: chicken, kale, carrots. take out- subway, poke bowls, [employer].     snacks: donuts, chips, cookies     Beverages: Iced tea, water, diet soda, coffee- sugar  and half and half, sbux refreshers, ETOH- 1 drink/week- wine or cocktail.     Willingness to change:  10/10    Cardiac studies:  The EKG portion of this study is negative for ischemia.  ·  The patient reported no chest pain during the stress test.  ·  The blood pressure response to stress was normal.  ·  During stress, occasional PVCs are noted.  ·  The exercise capacity was mildly impaired.    Vent. Rate : 089 BPM     Atrial Rate : 089 BPM      P-R Int : 154 ms          QRS Dur : 104 ms       QT Int : 354 ms       P-R-T Axes : 073 049 042 degrees      QTc Int : 430 ms     BMR:2173    PBF:  47.4%    Review of Systems       Objective   BP (!) 157/81 (BP Location: Right arm, Patient Position: Sitting)   Pulse 83   Wt (!) 158.7 kg (349 lb 14.4 oz)   SpO2 96%   BMI 48.80 kg/m²     Physical Exam  Vitals reviewed.   Constitutional:       General: He is not in acute distress.     Appearance: He is well-developed.      Comments: With severe obesity     HENT:      Head: Normocephalic and atraumatic.   Eyes:      General: No scleral icterus.     Pupils: Pupils are equal, round, and reactive to light.   Neck:      Thyroid: No thyromegaly.   Cardiovascular:      Rate and Rhythm: Normal rate and regular rhythm.      Heart sounds: Normal heart sounds. No murmur heard.    No friction rub. No gallop.   Pulmonary:      Effort: Pulmonary effort is normal. No respiratory distress.      Breath sounds: Normal breath sounds. No wheezing.   Abdominal:      General: Bowel sounds are normal. There is no distension.      Palpations: Abdomen is soft.      Tenderness: There is no abdominal tenderness.   Musculoskeletal:         General: Normal range of motion.      Cervical back: Normal range of motion and neck supple.   Skin:     General: Skin is warm and dry.   Neurological:      Mental Status: He is alert and oriented to person, place, and time.   Psychiatric:         Behavior: Behavior normal.         Judgment: Judgment normal.           Assessment and Plan     Problem List Items Addressed This Visit       Fatty liver    Hypertension, essential    Sleep apnea     Other Visit Diagnoses       Class 3 severe obesity due to excess calories with serious comorbidity and body mass index (BMI) of 45.0 to 49.9 in adult    -  Primary                  1. Class 3 severe obesity due to excess calories with serious comorbidity and body mass index (BMI) of 45.0 to 49.9 in adult  Patient was informed that topiramate is used for migraine prevention and seizures. Weight loss is a common side effect that is well documented. S/he understands this. S/he was informed of the potential side effects such as serious and possibly fatal rash in which case the medication should be discontinued immediately. Paresthesias, forgetfulness, fatigue, kidney stones, GI symptoms, and changes in lab values such as electrolytes, blood counts and kidney function.      Start topiramate  in the evening for 1 week, then morning and evening.       Exercise 30 min 3 days a week.     Patient counseled in strategies for long term weight loss and maintenance: Keeping a food diary, exercise for 1 hour a day and eating more protein than carbohydrates.    1800 gia pb meal planner, meal ideas and exercise handout given.         2. Hypertension, essential  The current medical regimen is effective;  continue present plan and medications. Expect improvement with weight loss.     3. Fatty liver  Discussed with patient that the only treatment for fatty liver is to lose weight.  Without doing so, it may progress to cirrhosis, permanent liver damage and possibly liver failure. Expect improvement with weight loss.      4. Sleep apnea, unspecified type  LEE does require getting closer to normal BMI range to see improvement that some other co-morbidities. Continue with, or consider, CPAP if indicated.

## 2023-05-17 ENCOUNTER — OFFICE VISIT (OUTPATIENT)
Dept: BARIATRICS | Facility: CLINIC | Age: 48
End: 2023-05-17
Payer: COMMERCIAL

## 2023-05-17 VITALS
HEART RATE: 83 BPM | SYSTOLIC BLOOD PRESSURE: 157 MMHG | OXYGEN SATURATION: 96 % | BODY MASS INDEX: 48.8 KG/M2 | DIASTOLIC BLOOD PRESSURE: 81 MMHG | WEIGHT: 315 LBS

## 2023-05-17 DIAGNOSIS — K76.0 FATTY LIVER: ICD-10-CM

## 2023-05-17 DIAGNOSIS — E66.01 CLASS 3 SEVERE OBESITY DUE TO EXCESS CALORIES WITH SERIOUS COMORBIDITY AND BODY MASS INDEX (BMI) OF 45.0 TO 49.9 IN ADULT: Primary | ICD-10-CM

## 2023-05-17 DIAGNOSIS — I10 HYPERTENSION, ESSENTIAL: ICD-10-CM

## 2023-05-17 DIAGNOSIS — G47.30 SLEEP APNEA, UNSPECIFIED TYPE: ICD-10-CM

## 2023-05-17 PROCEDURE — 99999 PR PBB SHADOW E&M-EST. PATIENT-LVL IV: CPT | Mod: PBBFAC,,, | Performed by: INTERNAL MEDICINE

## 2023-05-17 PROCEDURE — 99999 PR PBB SHADOW E&M-EST. PATIENT-LVL IV: ICD-10-PCS | Mod: PBBFAC,,, | Performed by: INTERNAL MEDICINE

## 2023-05-17 RX ORDER — TOPIRAMATE 25 MG/1
25 TABLET ORAL 2 TIMES DAILY
Qty: 180 TABLET | Refills: 0 | Status: SHIPPED | OUTPATIENT
Start: 2023-05-17 | End: 2023-06-21

## 2023-05-17 NOTE — PATIENT INSTRUCTIONS
"Patient was informed that topiramate is used for migraine prevention and seizures. Weight loss is a common side effect that is well documented. S/he understands this. S/he was informed of the potential side effects such as serious and possibly fatal rash in which case the medication should be discontinued immediately. Paresthesias, forgetfulness, fatigue, kidney stones, GI symptoms, and changes in lab values such as electrolytes, blood counts and kidney function.      Start topiramate  in the evening for 1 week, then morning and evening.       Exercise 30 min 3 days a week.     Patient counseled in strategies for long term weight loss and maintenance: Keeping a food diary, exercise for 1 hour a day and eating more protein than carbohydrates.        1800 calorie  Meal Plan  STARCHES 80 CALORIES PER SERVING 15g CARB, 3g PROTEIN, 1g FAT  Servings per day   bread   tortilla   crackers   cooked cereals   dry cereals   pasta   rice   corn   popcorn   potato (small)   potato, mashed   sweet potato   squash, winter   cooked beans, peas, lentils (add 1 meat exchange)   1 slice   1 (6")   4-6 (3/4 oz)   1/2 cup   3/4 cup   1/2 cup   1/3 cup  1/2 cup   1 small light bag  1 (3 oz)   1/2 cup   1/3 cup   1 cup   1/2 cup Most starches are a good source of B vitamins   Choose whole grain foods such as 100% whole wheat bread and flour, brown rice, tortillas, etc. for nutrients and fiber.   Combine beans (starch & meat) with grains (starch) for their complimentary proteins and fiber   Combine grains (starch) with milk (milk) or cheese (meat) to compliment proteins     5   FRUIT 60 CALORIES PER SERVING 15g CARB    fresh fruit   banana  melon (cubes)   berries  canned fruit   dried fruit    1 small   1/2  ½ cup   ¾ cup  ½ cup   ¼ cup    Choose whole fruits for fiber   No fruit juices   2   DAIRY  CALORIES PER SERVING 12g CARB, 8g PROTEIN, 0-8g FAT    milk   yogurt  Protein soy or almond milk 1 cup   1 cup   1 cup Use unsweetened " almond or soy milk with added protein  Avoid chocolate or flavored milk  Avoid yogurt with more than 8 gms of sugar. Gms of protein should be higher than grams of sugar               2   MEAT AND SUBSTITUES  CALORIES PER SERVING 7g Protein, 0-13g FAT    Seafood,meat and fish   cheese   cottage cheese   egg   peanut butter   tofu or tempeh  cooked beans, peas, lentils (add 1 starch)  Quinoa (add 1 starch)   Nuts and seeds (½ serving protein + 1 fat)  Nutritional yeast  Morning Star grillers 1 oz     1 oz  1/4 cup     1   1.5 Tbsp   4 oz (1/2 cup)   1/2 cup              ¼ cup            2 tablespoons    1 jason or ½ cup      Choose meat, fish, seafood and lower fat cheeses  Limit frying or adding fat.      13   FATS 45 CALORIES PER SERVING     oil   mayonnaise   cream cheese   salad dressing   peanuts   avocado   butter or margarine    1 tsp   1 tsp   1 Tbsp   1 Tbsp   10   1/8   1 tsp Eat less fat.   Eat less saturated fat such as animal fat found in fatter meat, cheese, and butter. Also eat less hydrogenated fat.      2-3   VEGETABLES 25 CALORIES PER SERVING 5 g CARB, 2g PROTEIN    raw vegetables   cooked vegetables   tomato or vegetable juice 1 cup   1/2 cup     1/2 cup Choose dark green leafy and deep yellow vegetables such as spinach, zuchinni, squash, mushrooms, cauliflower ,broccoli, carrots, and peppers.   Unlimited        1800 CALORIE MEAL PLAN  Eat 3 small meals per day with 1-2 small snacks to keep you full throughout the day  Aim for at least 15 gms of protein at breakfast, at least 25 grams at lunch and at least 25 grams of protein at dinner.  Snacks should contain at least 5 grams of protein  Limit starches to 1 serving per meal or snack.  Keep your carbs whole grain or whole wheat  Limit your intake of refined sugar including sugary beverages ie sweet tea, lemonade, fruit punch             Fruit Protein Dairy Starch Fats Calories   Breakfast 1 3 1 1  440   Lunch  4  2 1 470   Dinner 1 4  1 2 450    Snack  2 1 1  360   Total 2 11 2 5 3 1705     Sample breakfasts:  2 scrambled eggs (use spray), 1 cup Protein Silk soy milk, 1 slice whole wheat toast, 1 small orange  Omelet made with 1 egg, 1-ounce low fat cheese and non-starchy veggies, 1 low fat plain yogurt with ½ banana  Plain yogurt with ¾ cup unsweetened cold cereal and ½ cup fresh fruit salad, 2 hardboiled eggs  Sample lunches:  ½ whole wheat bagel topped with 3 ounces of low fat cheese melted in oven with a wild green salad with 1 TBSP dressing  ¾ cup cooked beans with 6 whole grain crackers, 10 roasted peanuts  ½ medium baked potato with 3 ounces of low fat cheddar cheese and 1 TBSP  sour cream  1 small wheat tortilla brushed with 1 tsp olive oil then brushed with pizza sauce and 4 ounces low fat cheese, sliced mushrooms and green peppers broiled until cheese is melted.  ½ cup chopped melon    Sample Dinners:  4 ounces of tuna pan seared in 1 tsp olive oil, ½ baked small sweet potato and 2 small plums  ½ cup chick peas, 1 cup cauliflower sauteed with 1 tbsp chopped onion, 1 tsp oil, and 2 tsp hanson powder. Add low sodium vegetable stock to desired consistency. Serve with ½ cup brown rice  Red beans seasoned with onions and bell peppers served over cauliflower rice  1 cup cooked green or brown lentils served with ½ cup cooked quinoa and chopped tomatoes and cucumbers and 1 tbsp feta cheese  Sample Snacks:  1 cup of Protein Silk Soy milk with 3 squares cinda crackers  3/4 ounce Triscuits with 1 ounce cubed cheese  Chobani Triple Zero yogurt with ¾ cup unsweetened cereal  ½ cup edamame (shelled)   Meal Ideas for Regular Bariatric Diet  *Recipes and products available at [email or web address]      Breakfast: (15-20g protein)    - Egg white omelet: 2 egg whites or ½ cup Egg Beaters. (Optional proteins: cheese, shrimp, black beans, chicken, sliced turkey) (Optional veggies: tomatoes, salsa, spinach, mushrooms, onions, green peppers, or small slice avocado)      - Egg and sausage: 1 egg or ¼ cup Egg Beaters (any variety), with 1 jason or 2 links of Turkey sausage or Veggie breakfast sausage (Smart Sparrow or Sifteo)    - Crust-less breakfast quiche: To make a glass pie dish, mix 4oz part skim Ricotta, 1 cup skim milk, and 2 eggs as your base. Add protein: shredded cheese, sliced lean ham or turkey, turkey bain/sausage. Add veggies: tomato, onion, green onion, mushroom, green pepper, spinach, etc.    - Yogurt parfait: Mix 1 - 6oz container Dannon Light N Fit vanilla yogurt, with ¼ cup Kashi Go Lean cereal    - Cottage cheese and fruit: ½ cup part-skim cottage cheese or ricotta cheese topped with fresh fruit or sugar free preserves     - Vianca Paniagua's Vanilla Egg custard* (add 2 Tbsp instant coffee granules to make Cappuccino Custard*)    - Hi-Protein café latte (skim milk, decaf coffee, 1 scoop protein powder). Optional to add Sugar free syrup or extract flavoring.    Lunch: (20-30g protein)    - ½ cup Black bean soup (Homemade or Progresso), with ¼ cup shredded low-fat cheese. Top with chopped tomato or fresh salsa.     - Lean deli turkey breast and low-fat sliced cheese, mustard or light gonsalez to moisten, rolled up together, or wrapped in a Charan lettuce leaf    - Chicken salad made from dinner leftovers, moisten with low-fat salad dressing or light gonsalez. Also try leftover salmon, shrimp, tuna or boiled eggs. Serve ½ cup over dark green salad    - Fat-free canned refried beans, topped with ¼ cup shredded low-fat cheese. Top with chopped tomato or fresh salsa.     - Greek salad: Top mixed greens with 1-2oz grilled chicken, tomatoes, red onions, 2-3 kalamata olives, and sprinkle lightly with feta cheese. Spritz with Balsamic vinegar to taste.     - Crust-less lunch quiche: To make a glass pie dish, mix 4oz part skim Ricotta, 1 cup skim milk, and 2 eggs as your base. Add protein: shredded cheese, sliced lean ham or turkey, shrimp, chicken. Add veggies: tomato, onion,  green onion, mushroom, green pepper, spinach, artichoke, broccoli, etc.    - Pizza bake: tomato sauce, low-fat shredded mozzarella and turkey pepperoni or Russian bain. Add any veggies.    - Cucumber crab bites: Spread ¼ cup crab dip (lump crabmeat + light cream cheese and green onions) over sliced cucumber.     - Chicken with light spinach and artichoke dip*: Puree in : 6oz cooked and drained spinach, 2 cloves garlic, 1 can cannelloni beans, ½ cup chopped green onions, 1 can drained artichoke hearts (not marinated in oil), lemon juice and basil. Mix in 2oz chopped up chicken.    Supper: (20-30g protein)    - Serve grilled fish over dark green salad tossed with low-fat dressing, served with grilled asparagus connor     - Rotisserie chicken salad: served with sliced strawberries, walnuts, fat-free feta cheese crumbles and 1 tbsp Maries Own Light Raspberry [place] Vinaigrette    - Shrimp cocktail: Dip cold boiled shrimp in homemade low-sugar cocktail sauce (1/2 cup Nida One Carb ketchup, 2 tbsp horseradish, 1/4 tsp hot sauce, 1 tsp Worcestershire sauce, 1 tbsp freshly-squeezed lemon juice). Serve with dark green salad, walnuts, and crumbled blue cheese drizzled with olive oil and Balsamic vinegar    - Tuna Melt: Spread tuna salad onto 2 thick slices of tomato. Top with low-fat cheese and broil until cheese is melted. May also be made with chicken salad of shrimp salad. [place] with different types of cheeses.    - Homemade low-fat Chili using extra lean ground beef or ground turkey. Top with shredded cheese and salsa as desired. May add dollop fat-free sour cream if desired    - Dinner Omelet with shrimp or chicken and onion, green peppers and chives.    - No noodle lasagna: Use sliced zucchini or eggplant in place of noodles.  Layer with part skim ricotta cheese and low sugar meat sauce (use very lean ground beef or ground turkey).    - Mexican chicken bake: Bake chunks of chicken breast or  thigh with taco seasoning, Pace brand enchilada sauce, green onions and low-fat cheese. Serve with ¼ cup black beans or fat free refried beans topped with chopped tomatoes or salsa.    - Thierry frozen meatballs, simmered in Classico Marinara sauce. Different flavors of salsa or spaghetti sauce create different dishes! Sprinkle with parmesan cheese. Serve with grilled or steamed veggies, or a dark green salad.    - Simmer boneless skinless chicken thigh chunks in Classico Marinara sauce or roasted salsa until tender with chopped onion, bell pepper, garlic, mushrooms, spinach, etc.     - Hamburger, without the bun, dressed the way you like. Served with grilled or steamed veggies.    - Eggplant parmesan: Bake slices of eggplant at 350 degrees for 15 minutes. Layer tomato sauce, sliced eggplant and low-fat mozzarella cheese in a baking dish and cover with foil. Bake 30-40 more minutes or until bubbly. Uncover and bake at 400 degrees for about 15 more minutes, or until top is slightly crisp.    - Fish tacos: grilled/baked white fish, wrapped in Charan lettuce leaf, topped with salsa, shredded low-fat cheese, and light coleslaw.    Snacks: (100-200 calories; >5g protein)    - 1 low-fat cheese stick with 8 cherry tomatoes or 1 serving fresh fruit  - 4 thin slices fat-free turkey breast and 1 slice low-fat cheese  - 4 thin slices fat-free honey ham with wedge of melon  - 1/4 cup unsalted nuts with ½ cup fruit  - 6-oz container Dannon Light n Fit vanilla yogurt, topped with 1oz unsalted nuts         - apple, celery or baby carrots spread with 2 Tbsp natural peanut butter or almond butter   - apple slices with 1 oz slice low-fat cheese  - celery, cucumber, bell pepper or baby carrots dipped in ¼ cup hummus bean spread or light spinach and artichoke dip (*recipe in lunch section)  - 100 calorie bag microwave light popcorn with 3 tbsp grated parmesan cheese  - George Links Beef Steak - 14g protein! (similar to beef  jerky)  - 2 wedges [place] - Light Herb & Garlic Cheese with sliced cucumber or green bell pepper  - 1/2 cup low-fat cottage cheese with ¼ cup fruit or ¼ cup salsa  - RTD Protein drinks: Atkins, Low Carb Slim Fast, EAS light, Muscle Milk Light, etc.  - Homemade Protein drinks: GNC Soy95, Isopure, Nectar, UNJURY, Whey Gourmet, etc. Mix 1 scoop powder with 8oz skim/1% milk or light soymilk.  - Protein bars: Atkins, EAS, Pure Protein, Think Thin, Detour, etc. Must have 0-4 grams sugar - Read the label.    Takeout Options: No more than twice/week  Deli - Salads (no pasta or rice), meats, cheeses. Roasted chicken. Lox (salmon)    Mexican - Platters which don't include tortillas, chips, or rice. Go easy on the beans. Example: Fajitas without the tortillas. Ask the  not to bring chips to the table if they are too tempting.    Greek - Meat or fish and vegetable, but no bread or rice. Including hummus, baba ganoush, etc, is OK. Most sit-down Greek restaurants can provide you with cucumber slices for dipping instead of elizabeth bread.    Fast Food (Avoid as much as possible) - Salads (no croutons and limit salad dressing to 2 tbsp), grilled chicken sandwich without the bun and ask for no gonsalez. Abys low fat chili or Taco Bell pintos and cheese.    BBQ - The meats are fine if you ask for sauces on the side, but most of the traditional side dishes are loaded with carbs. Schuyler slaw, baked beans and BBQ sauce are typically made with sugar.    Chinese - Nothing deep-fried, no rice or noodles. Many Chinese sauces have starch and sugar in them, so you'll have to use your judgement. If you find that these sauces trigger cravings, or cause Dumping, you can ask for the sauce to be made without sugar or just use soy sauce.    Exercise should be some cardiovascular and some resistance training(see below).      STRENGTHENING  An adequate resistance training program could include the following types of exercise, focusing on the  major muscle groups. One can use 5 to 10 pound dumbbells for those exercises that require the use of weight. At home, one can use a household item that provides a comfortable weight, such as a milk carton or beverage container. These exercises can also be performed without weights. Add some type of resistance training 2-3 days a week. These can be body weight exercises, light weight or elastic bands. [employer] and [employer] are great sources for free work out plans and videos.       Chest (Bench Press): Hold the weights with your hands. Lying on a flat surface, with knees bent and feet flat, slowly bring the weights to the chest area with palms facing upward. Begin to exhale and press the weights up, fully extending the arms, and keeping them above your eyes. Inhale as you lower them to the starting position and repeat the movement.  Back (Bent-Over Row): Start by placing your feet shoulder-width apart.  a weight with each hand just outside the knees. Keeping the back straight and the knees flexed, slightly bend forward at the waist. Let the arms hang naturally while holding the weights. From this starting position, pull the weights to the lower abdomen, keeping the elbows close to the body. Exhale as you pull. Return to the starting position, inhaling as you go.  Arms (Bicep Curls): Hold a weight in each hand, with elbows at your sides, palms facing forward. The back should be straight, the chest flared outward. Begin to bend your right arm up first while exhaling, keeping the elbow totally stationary. Wait until the right arm goes completely down to the fully extended position, and then begin to curl the left arm. Each arm curled completes one full repetition.  Shoulders (Lateral Raises): Place the feet a few inches apart with the knees bent slightly. Keep the back erect as you lean forward slightly. With the weights in front of your thighs and palms facing together, begin to slowly raise them up to the side  "until parallel with the floor. Lower the weights to your thighs, and repeat.  Legs (Stiff Leg Dead Lift): Start by standing straight, holding the weights close to your sides, nearly touching your thighs. Keep the weights close to the body--this protects the back. The back must stay straight. Bend at the waist as far forward as you comfortably can while keeping your legs straight, and begin to feel the pull in your hamstrings as you lower the weights toward the ground. Slowly return to the starting position, keeping the back straight.  Legs (Dumbbell Lunge): Hold a weight in each hand, arms hanging at your sides. Step out with one leg, keeping the back straight. It is important to step out far enough so that the knee does not extend over the toe. This puts too much stress on the knee. Go down far enough so that the opposite knee nearly touches the ground. Keep this stance and repeat the lunging motion for several repetitions.  Abdominals: Do not perform standard sit-ups as these could hurt the lower back. Rather, focus on the following 2 exercises: (1) Obliques--Lie on your back with the knees flexed in the bent sit-up position. From this position, bring both knees down to the ground. With the back remaining flat, begin to flex your body toward your toes ("crunch"). Bring your shoulders up off the ground, but go slowly, controlling your momentum. Repeat this for 10 to 15 times. (2) Seated bench kicks/valerio knives--Sit on the end of a bench or chair with the hands placed behind the buttocks. Begin to kick the legs outward with the knees bent slightly--at the same time, lean back to extend the torso. Come back to the beginning position and repeat this motion 10 to 15 times.      "

## 2023-05-24 ENCOUNTER — PATIENT MESSAGE (OUTPATIENT)
Dept: BARIATRICS | Facility: CLINIC | Age: 48
End: 2023-05-24
Payer: COMMERCIAL

## 2023-06-21 ENCOUNTER — OFFICE VISIT (OUTPATIENT)
Dept: BARIATRICS | Facility: CLINIC | Age: 48
End: 2023-06-21
Payer: COMMERCIAL

## 2023-06-21 VITALS
WEIGHT: 315 LBS | HEART RATE: 81 BPM | BODY MASS INDEX: 44.1 KG/M2 | DIASTOLIC BLOOD PRESSURE: 82 MMHG | HEIGHT: 71 IN | SYSTOLIC BLOOD PRESSURE: 132 MMHG | OXYGEN SATURATION: 97 %

## 2023-06-21 DIAGNOSIS — E66.01 CLASS 3 SEVERE OBESITY DUE TO EXCESS CALORIES WITH SERIOUS COMORBIDITY AND BODY MASS INDEX (BMI) OF 45.0 TO 49.9 IN ADULT: Primary | ICD-10-CM

## 2023-06-21 PROCEDURE — 3075F SYST BP GE 130 - 139MM HG: CPT | Mod: CPTII,S$GLB,, | Performed by: INTERNAL MEDICINE

## 2023-06-21 PROCEDURE — 3079F PR MOST RECENT DIASTOLIC BLOOD PRESSURE 80-89 MM HG: ICD-10-PCS | Mod: CPTII,S$GLB,, | Performed by: INTERNAL MEDICINE

## 2023-06-21 PROCEDURE — 99214 PR OFFICE/OUTPT VISIT, EST, LEVL IV, 30-39 MIN: ICD-10-PCS | Mod: S$GLB,,, | Performed by: INTERNAL MEDICINE

## 2023-06-21 PROCEDURE — 99214 OFFICE O/P EST MOD 30 MIN: CPT | Mod: S$GLB,,, | Performed by: INTERNAL MEDICINE

## 2023-06-21 PROCEDURE — 1159F PR MEDICATION LIST DOCUMENTED IN MEDICAL RECORD: ICD-10-PCS | Mod: CPTII,S$GLB,, | Performed by: INTERNAL MEDICINE

## 2023-06-21 PROCEDURE — 3075F PR MOST RECENT SYSTOLIC BLOOD PRESS GE 130-139MM HG: ICD-10-PCS | Mod: CPTII,S$GLB,, | Performed by: INTERNAL MEDICINE

## 2023-06-21 PROCEDURE — 1159F MED LIST DOCD IN RCRD: CPT | Mod: CPTII,S$GLB,, | Performed by: INTERNAL MEDICINE

## 2023-06-21 PROCEDURE — 99999 PR PBB SHADOW E&M-EST. PATIENT-LVL IV: ICD-10-PCS | Mod: PBBFAC,,, | Performed by: INTERNAL MEDICINE

## 2023-06-21 PROCEDURE — 1160F RVW MEDS BY RX/DR IN RCRD: CPT | Mod: CPTII,S$GLB,, | Performed by: INTERNAL MEDICINE

## 2023-06-21 PROCEDURE — 4010F PR ACE/ARB THEARPY RXD/TAKEN: ICD-10-PCS | Mod: CPTII,S$GLB,, | Performed by: INTERNAL MEDICINE

## 2023-06-21 PROCEDURE — 99999 PR PBB SHADOW E&M-EST. PATIENT-LVL IV: CPT | Mod: PBBFAC,,, | Performed by: INTERNAL MEDICINE

## 2023-06-21 PROCEDURE — 1160F PR REVIEW ALL MEDS BY PRESCRIBER/CLIN PHARMACIST DOCUMENTED: ICD-10-PCS | Mod: CPTII,S$GLB,, | Performed by: INTERNAL MEDICINE

## 2023-06-21 PROCEDURE — 3079F DIAST BP 80-89 MM HG: CPT | Mod: CPTII,S$GLB,, | Performed by: INTERNAL MEDICINE

## 2023-06-21 PROCEDURE — 4010F ACE/ARB THERAPY RXD/TAKEN: CPT | Mod: CPTII,S$GLB,, | Performed by: INTERNAL MEDICINE

## 2023-06-21 PROCEDURE — 3008F BODY MASS INDEX DOCD: CPT | Mod: CPTII,S$GLB,, | Performed by: INTERNAL MEDICINE

## 2023-06-21 PROCEDURE — 3008F PR BODY MASS INDEX (BMI) DOCUMENTED: ICD-10-PCS | Mod: CPTII,S$GLB,, | Performed by: INTERNAL MEDICINE

## 2023-06-21 RX ORDER — TOPIRAMATE 50 MG/1
50 TABLET, FILM COATED ORAL 2 TIMES DAILY
Qty: 180 TABLET | Refills: 0 | Status: SHIPPED | OUTPATIENT
Start: 2023-06-21 | End: 2024-03-04

## 2023-06-21 NOTE — PROGRESS NOTES
"Subjective     Patient ID: Sarkis Saez Jr. is a 48 y.o. male.    Chief Complaint: Follow-up    CC: Weight   Pt here today for follow-up. Has lost 3.1 lbs (-4.4 lbs muscle. +6.2 lbs fat). Was to start 1800 gia pb meal planner, exercise and started topiramate 25 mg BID. Denies SE. Feels appetite is down some. Has noted he is eating more at times 2/2 stress about his divorce.   States he has been snacking less. Trying to use meal ideas sometimes, or looking up calories, but not consistently. Has been walking.     New BMR: 2082    New PBF: 49.6%      Initial:    BMR:2173    PBF:  47.4%    Lab Results       Component                Value               Date                       HGBA1C                   5.2                 07/27/2021                 HGBA1C                   5.0                 10/10/2013            Lab Results       Component                Value               Date                       LDLCALC                  143.4               09/28/2022                 CREATININE               0.8                 09/28/2022              Lab Results       Component                Value               Date                       ALT                      52 (H)              09/28/2022                 AST                      23                  09/28/2022                 ALKPHOS                  22 (L)              09/28/2022                 BILITOT                  0.6                 09/28/2022                Review of Systems       Objective   /82 (BP Location: Right arm, Patient Position: Sitting, BP Method: Large (Manual))   Pulse 81   Ht 5' 11" (1.803 m)   Wt (!) 157.3 kg (346 lb 12.8 oz)   SpO2 97%   BMI 48.37 kg/m²     Physical Exam  Vitals reviewed.   Constitutional:       General: He is not in acute distress.     Appearance: He is well-developed.      Comments: With severe obesity     HENT:      Head: Normocephalic and atraumatic.   Eyes:      General: No scleral icterus.     Pupils: Pupils are " equal, round, and reactive to light.   Cardiovascular:      Rate and Rhythm: Normal rate.   Pulmonary:      Effort: Pulmonary effort is normal. No respiratory distress.   Musculoskeletal:         General: Normal range of motion.   Skin:     General: Skin is warm and dry.   Neurological:      Mental Status: He is alert and oriented to person, place, and time.   Psychiatric:         Behavior: Behavior normal.         Judgment: Judgment normal.          Assessment and Plan     Problem List Items Addressed This Visit    None  Visit Diagnoses       Class 3 severe obesity due to excess calories with serious comorbidity and body mass index (BMI) of 45.0 to 49.9 in adult    -  Primary                    1. Class 3 severe obesity due to excess calories with serious comorbidity and body mass index (BMI) of 45.0 to 49.9 in adult  Patient was informed that topiramate is used for migraine prevention and seizures. Weight loss is a common side effect that is well documented. S/he understands this. S/he was informed of the potential side effects such as serious and possibly fatal rash in which case the medication should be discontinued immediately. Paresthesias, forgetfulness, fatigue, kidney stones, GI symptoms, and changes in lab values such as electrolytes, blood counts and kidney function.      Start topiramate  50 mg morning and evening.       Exercise 30 min 3-5 days a week.     Patient counseled in strategies for long term weight loss and maintenance: Keeping a food diary, exercise for 1 hour a day and eating more protein than carbohydrates.    1800 gia pb meal planner, meal ideas and exercise handout given previously.    Discussed strategies for developing new coping mechanisms and becoming more self aware of eating behavior.        Have a plan in place for coping with emotions if you get the urge to make poor food choices- Set a timer for 10-15 minutes and find a way to distract yourself. Take a walk, drink water, write  down how you feel.     Eliminate temptation where possible. Limit foods available that you know you tend to overeat. Eat proteins first.       Consider keeping a food log.      Hurricane tips given

## 2023-06-21 NOTE — PATIENT INSTRUCTIONS
Patient was informed that topiramate is used for migraine prevention and seizures. Weight loss is a common side effect that is well documented. S/he understands this. S/he was informed of the potential side effects such as serious and possibly fatal rash in which case the medication should be discontinued immediately. Paresthesias, forgetfulness, fatigue, kidney stones, GI symptoms, and changes in lab values such as electrolytes, blood counts and kidney function.      Start topiramate  50 mg morning and evening.       Exercise 30 min 3-5 days a week.     Patient counseled in strategies for long term weight loss and maintenance: Keeping a food diary, exercise for 1 hour a day and eating more protein than carbohydrates.    1800 gia pb meal planner, meal ideas and exercise handout given previously.    Discussed strategies for developing new coping mechanisms and becoming more self aware of eating behavior.        Have a plan in place for coping with emotions if you get the urge to make poor food choices- Set a timer for 10-15 minutes and find a way to distract yourself. Take a walk, drink water, write down how you feel.     Eliminate temptation where possible. Limit foods available that you know you tend to overeat. Eat proteins first.       Consider keeping a food log.      Tips for preparing for hurricane season:    If you are on weight loss medications, please take your medication with you in case of evacuation. Injectable medications should be transported with an ice pack if unopened or room temperature if you have started to use them.   Hurricane supplies do not necessarily need to be junk food or high in carbs or sugar. Shelf stable and healthy choices to include in your supplies could include:  Canned/packets of tuna or chicken  Apples, oranges, banana, pears  Beef or turkey jerky  Sugar free pudding  Pickle, olives, dill relish (mix with the tuna or chicken!)  Low sodium or no salt added canned vegetables  If  you get bread, get lite bread (40 calories a slice)  0 sugar sports drinks  Water  String cheese will be okay for a few days without refrigeration or in an ice chest.   Peanut or other nut butter.   Parmesan crisps  Pork skins  Protein shakes (20-30g protein, less than 5 g sugar)  Protein bars (15 or more g protein, less than 5 g sugar)  Don't forget disposable forks, spoons, plates in your supplies  If evacuated, manage stress by taking walks, reading or meditating.   If eating out make better choices when possible.   Salads with a lean protein and limited dressing, cheese or other toppings  Grilled chicken sandwich or burger without the bun.   Skip the fries!  Order water or unsweetened tea instead of soda  Grocery stores with a deli, salad/food bar can be a good quick and affordable option to replace fast food

## 2023-07-14 DIAGNOSIS — I10 HYPERTENSION, ESSENTIAL: ICD-10-CM

## 2023-07-14 RX ORDER — AMLODIPINE BESYLATE 10 MG/1
10 TABLET ORAL DAILY
Qty: 90 TABLET | Refills: 0 | Status: SHIPPED | OUTPATIENT
Start: 2023-07-14 | End: 2024-01-09

## 2023-07-14 NOTE — TELEPHONE ENCOUNTER
Refill Decision Note   Sarkis Se  is requesting a refill authorization.  Brief Assessment and Rationale for Refill:  Approve     Medication Therapy Plan:         Alert overridden per protocol: Yes   Comments:     No Care Gaps recommended.     Note composed:3:55 PM 07/14/2023

## 2023-07-28 ENCOUNTER — PATIENT MESSAGE (OUTPATIENT)
Dept: ADMINISTRATIVE | Facility: OTHER | Age: 48
End: 2023-07-28
Payer: COMMERCIAL

## 2023-10-02 DIAGNOSIS — I10 HYPERTENSION, ESSENTIAL: ICD-10-CM

## 2023-10-02 NOTE — TELEPHONE ENCOUNTER
Care Due:                  Date            Visit Type   Department     Provider  --------------------------------------------------------------------------------                                MYCHART                              ANNUAL                              CHECKUP/PHY  [facility] INTERNAL  Last Visit: 09-      [facility]       KULDIP BARCENAS  Next Visit: None Scheduled  None         None Found                                                            Last  Test          Frequency    Reason                     Performed    Due Date  --------------------------------------------------------------------------------    Office Visit  12 months..  amLODIPine, tadalafiL,     09- 09-                             valsartan................    CMP.........  12 months..  valsartan................  09- 09-    [facility] Embedded Care Due Messages. Reference number: [id number].   10/02/2023 4:38:35 PM CDT

## 2023-10-02 NOTE — TELEPHONE ENCOUNTER
Refill Routing Note     Refill Routing Note   Medication(s) are not appropriate for processing by [facility] for the following reason(s):      Required labs outdated    [facility] action(s):  Defer Care Due:  Appointment due  Labs due            Appointments  past 12m or future 3m with PCP    Date Provider   Last Visit   9/28/2022 Dave Hansen MD   Next Visit   Visit date not found Dave Hansen MD   ED visits in past 90 days: 0        Note composed:6:58 PM 10/02/2023

## 2023-10-06 RX ORDER — VALSARTAN 320 MG/1
320 TABLET ORAL DAILY
Qty: 90 TABLET | Refills: 0 | Status: SHIPPED | OUTPATIENT
Start: 2023-10-06 | End: 2024-01-19 | Stop reason: SDUPTHER

## 2023-11-13 ENCOUNTER — PATIENT MESSAGE (OUTPATIENT)
Dept: INTERNAL MEDICINE | Facility: CLINIC | Age: 48
End: 2023-11-13
Payer: COMMERCIAL

## 2024-01-05 ENCOUNTER — PATIENT OUTREACH (OUTPATIENT)
Dept: ADMINISTRATIVE | Facility: HOSPITAL | Age: 49
End: 2024-01-05
Payer: COMMERCIAL

## 2024-01-05 NOTE — PROGRESS NOTES

## 2024-01-09 DIAGNOSIS — I10 HYPERTENSION, ESSENTIAL: ICD-10-CM

## 2024-01-09 RX ORDER — AMLODIPINE BESYLATE 10 MG/1
10 TABLET ORAL DAILY
Qty: 90 TABLET | Refills: 0 | Status: SHIPPED | OUTPATIENT
Start: 2024-01-09 | End: 2024-01-19 | Stop reason: SDUPTHER

## 2024-01-09 NOTE — TELEPHONE ENCOUNTER
Care Due:                  Date            Visit Type   Department     Provider  --------------------------------------------------------------------------------                                MYCHART                              ANNUAL                              CHECKUP/PHY  [facility] INTERNAL  Last Visit: 09-      S            MEDICINE       KULDIP BARCENAS                              EP -                              PRIMARY      [facility] INTERNAL  Next Visit: 01-      CARE ([facility])   MEDICINE       KULDIP BARCENAS                                                            Last  Test          Frequency    Reason                     Performed    Due Date  --------------------------------------------------------------------------------    CMP.........  12 months..  valsartan................  09- 09-    [facility] Embedded Care Due Messages. Reference number: [id number].   1/09/2024 9:12:03 AM CST

## 2024-01-10 NOTE — TELEPHONE ENCOUNTER
Refill Routing Note   Medication(s) are not appropriate for processing by [facility] for the following reason(s):        Patient not seen by provider within 15 months    [facility] action(s):  Defer     Requires labs : Yes             Appointments  past 12m or future 3m with PCP    Date Provider   Last Visit   9/28/2022 Dave Hansen MD   Next Visit   1/19/2024 Dave Hansen MD   ED visits in past 90 days: 0        Note composed:9:46 PM 01/09/2024

## 2024-01-19 ENCOUNTER — LAB VISIT (OUTPATIENT)
Dept: LAB | Facility: HOSPITAL | Age: 49
End: 2024-01-19
Attending: FAMILY MEDICINE
Payer: COMMERCIAL

## 2024-01-19 ENCOUNTER — OFFICE VISIT (OUTPATIENT)
Dept: INTERNAL MEDICINE | Facility: CLINIC | Age: 49
End: 2024-01-19
Attending: FAMILY MEDICINE
Payer: COMMERCIAL

## 2024-01-19 VITALS
BODY MASS INDEX: 44.1 KG/M2 | SYSTOLIC BLOOD PRESSURE: 130 MMHG | DIASTOLIC BLOOD PRESSURE: 74 MMHG | HEIGHT: 71 IN | WEIGHT: 315 LBS | HEART RATE: 108 BPM | OXYGEN SATURATION: 95 %

## 2024-01-19 DIAGNOSIS — Z00.00 ANNUAL PHYSICAL EXAM: Primary | ICD-10-CM

## 2024-01-19 DIAGNOSIS — I10 HYPERTENSION, ESSENTIAL: ICD-10-CM

## 2024-01-19 DIAGNOSIS — F32.1 MAJOR DEPRESSIVE DISORDER, SINGLE EPISODE, MODERATE: ICD-10-CM

## 2024-01-19 DIAGNOSIS — M51.26 LUMBAR DISC HERNIATION: ICD-10-CM

## 2024-01-19 DIAGNOSIS — Z00.00 ANNUAL PHYSICAL EXAM: ICD-10-CM

## 2024-01-19 DIAGNOSIS — E66.01 CLASS 3 SEVERE OBESITY DUE TO EXCESS CALORIES WITH SERIOUS COMORBIDITY AND BODY MASS INDEX (BMI) OF 45.0 TO 49.9 IN ADULT: ICD-10-CM

## 2024-01-19 DIAGNOSIS — Z12.5 PROSTATE CANCER SCREENING: ICD-10-CM

## 2024-01-19 DIAGNOSIS — E78.5 HYPERLIPIDEMIA, UNSPECIFIED HYPERLIPIDEMIA TYPE: ICD-10-CM

## 2024-01-19 DIAGNOSIS — G47.33 OBSTRUCTIVE SLEEP APNEA SYNDROME: ICD-10-CM

## 2024-01-19 DIAGNOSIS — Z12.11 COLON CANCER SCREENING: ICD-10-CM

## 2024-01-19 PROBLEM — E66.813 CLASS 3 SEVERE OBESITY DUE TO EXCESS CALORIES WITH SERIOUS COMORBIDITY AND BODY MASS INDEX (BMI) OF 45.0 TO 49.9 IN ADULT: Status: ACTIVE | Noted: 2022-09-28

## 2024-01-19 LAB
ALBUMIN SERPL BCP-MCNC: 4.1 G/DL (ref 3.5–5.2)
ALP SERPL-CCNC: 24 U/L (ref 55–135)
ALT SERPL W/O P-5'-P-CCNC: 35 U/L (ref 10–44)
ANION GAP SERPL CALC-SCNC: 10 MMOL/L (ref 8–16)
AST SERPL-CCNC: 17 U/L (ref 10–40)
BILIRUB SERPL-MCNC: 0.5 MG/DL (ref 0.1–1)
BUN SERPL-MCNC: 13 MG/DL (ref 6–20)
CALCIUM SERPL-MCNC: 10.3 MG/DL (ref 8.7–10.5)
CHLORIDE SERPL-SCNC: 104 MMOL/L (ref 95–110)
CHOLEST SERPL-MCNC: 209 MG/DL (ref 120–199)
CHOLEST/HDLC SERPL: 5.8 {RATIO} (ref 2–5)
CO2 SERPL-SCNC: 25 MMOL/L (ref 23–29)
COMPLEXED PSA SERPL-MCNC: 1.6 NG/ML (ref 0–4)
CREAT SERPL-MCNC: 0.9 MG/DL (ref 0.5–1.4)
EST. GFR  (NO RACE VARIABLE): >60 ML/MIN/1.73 M^2
ESTIMATED AVG GLUCOSE: 126 MG/DL (ref 68–131)
GLUCOSE SERPL-MCNC: 127 MG/DL (ref 70–110)
HBA1C MFR BLD: 6 % (ref 4–5.6)
HCV AB SERPL QL IA: NORMAL
HDLC SERPL-MCNC: 36 MG/DL (ref 40–75)
HDLC SERPL: 17.2 % (ref 20–50)
LDLC SERPL CALC-MCNC: 149.2 MG/DL (ref 63–159)
NONHDLC SERPL-MCNC: 173 MG/DL
POTASSIUM SERPL-SCNC: 4.8 MMOL/L (ref 3.5–5.1)
PROT SERPL-MCNC: 7.6 G/DL (ref 6–8.4)
SODIUM SERPL-SCNC: 139 MMOL/L (ref 136–145)
TRIGL SERPL-MCNC: 119 MG/DL (ref 30–150)

## 2024-01-19 PROCEDURE — 3008F BODY MASS INDEX DOCD: CPT | Mod: CPTII,S$GLB,, | Performed by: FAMILY MEDICINE

## 2024-01-19 PROCEDURE — 3075F SYST BP GE 130 - 139MM HG: CPT | Mod: CPTII,S$GLB,, | Performed by: FAMILY MEDICINE

## 2024-01-19 PROCEDURE — 1160F RVW MEDS BY RX/DR IN RCRD: CPT | Mod: CPTII,S$GLB,, | Performed by: FAMILY MEDICINE

## 2024-01-19 PROCEDURE — 99999 PR PBB SHADOW E&M-EST. PATIENT-LVL V: CPT | Mod: PBBFAC,,, | Performed by: FAMILY MEDICINE

## 2024-01-19 PROCEDURE — 80061 LIPID PANEL: CPT | Performed by: FAMILY MEDICINE

## 2024-01-19 PROCEDURE — 99396 PREV VISIT EST AGE 40-64: CPT | Mod: S$GLB,,, | Performed by: FAMILY MEDICINE

## 2024-01-19 PROCEDURE — 36415 COLL VENOUS BLD VENIPUNCTURE: CPT | Performed by: FAMILY MEDICINE

## 2024-01-19 PROCEDURE — 83036 HEMOGLOBIN GLYCOSYLATED A1C: CPT | Performed by: FAMILY MEDICINE

## 2024-01-19 PROCEDURE — 1159F MED LIST DOCD IN RCRD: CPT | Mod: CPTII,S$GLB,, | Performed by: FAMILY MEDICINE

## 2024-01-19 PROCEDURE — 84153 ASSAY OF PSA TOTAL: CPT | Performed by: FAMILY MEDICINE

## 2024-01-19 PROCEDURE — 86803 HEPATITIS C AB TEST: CPT | Performed by: FAMILY MEDICINE

## 2024-01-19 PROCEDURE — 80053 COMPREHEN METABOLIC PANEL: CPT | Performed by: FAMILY MEDICINE

## 2024-01-19 PROCEDURE — 3078F DIAST BP <80 MM HG: CPT | Mod: CPTII,S$GLB,, | Performed by: FAMILY MEDICINE

## 2024-01-19 RX ORDER — AMLODIPINE BESYLATE 10 MG/1
10 TABLET ORAL DAILY
Qty: 90 TABLET | Refills: 3 | Status: SHIPPED | OUTPATIENT
Start: 2024-01-19

## 2024-01-19 RX ORDER — DULOXETIN HYDROCHLORIDE 30 MG/1
30 CAPSULE, DELAYED RELEASE ORAL DAILY
COMMUNITY

## 2024-01-19 RX ORDER — VALSARTAN 320 MG/1
320 TABLET ORAL DAILY
Qty: 90 TABLET | Refills: 3 | Status: SHIPPED | OUTPATIENT
Start: 2024-01-19

## 2024-01-19 NOTE — PROGRESS NOTES
Subjective:       Patient ID: Sarkis Saez Jr. is a 49 y.o. male.    Chief Complaint: Annual Exam    Established patient for an annual wellness check/physical exam and also chronic disease management. Specific complaints - see dictation, M*model entries and please see ROS.  Past, Surgical, Family, Social Histories; Medications, Allergies reviewed and reconciled.  Health maintenance file reviewed and addressed items due. Recent applicable lab, imaging and cardiovascular results reviewed.  Problem list items reviewed and modified or added entries (in the overview section) may not be transcribed into this encounter note due to note writer format.    Divorce finalized. Still seeing bariatrics. Radiating pain RLE (worsened). Not so much back pain.         Review of Systems   Constitutional:  Negative for appetite change, chills, diaphoresis, fatigue and fever.   HENT:  Negative for congestion, postnasal drip, rhinorrhea, sore throat and trouble swallowing.    Eyes:  Negative for visual disturbance.   Respiratory:  Negative for cough, choking, chest tightness, shortness of breath and wheezing.    Cardiovascular:  Negative for chest pain and leg swelling.   Gastrointestinal:  Negative for abdominal distention, abdominal pain, diarrhea, nausea and vomiting.   Genitourinary:  Negative for difficulty urinating and hematuria.   Musculoskeletal:  Positive for back pain and myalgias. Negative for arthralgias.   Skin:  Negative for rash.   Neurological:  Negative for weakness, light-headedness and headaches.   Psychiatric/Behavioral:  Negative for confusion and dysphoric mood.        Objective:      Physical Exam  Vitals and nursing note reviewed.   Constitutional:       Appearance: He is well-developed. He is obese. He is not diaphoretic.   Eyes:      General: No scleral icterus.  Neck:      Thyroid: No thyromegaly.      Vascular: No carotid bruit or JVD.      Trachea: No tracheal deviation.   Cardiovascular:      Rate and  Rhythm: Normal rate and regular rhythm.      Heart sounds: Normal heart sounds. No murmur heard.     No friction rub. No gallop.   Pulmonary:      Effort: Pulmonary effort is normal. No respiratory distress.      Breath sounds: Normal breath sounds. No wheezing or rales.   Abdominal:      General: There is no distension.      Palpations: Abdomen is soft. There is no mass.      Tenderness: There is no abdominal tenderness. There is no guarding or rebound.   Musculoskeletal:      Cervical back: Normal range of motion and neck supple.   Lymphadenopathy:      Cervical: No cervical adenopathy.   Skin:     General: Skin is warm and dry.      Findings: No erythema or rash.   Neurological:      Mental Status: He is alert and oriented to person, place, and time.      Cranial Nerves: No cranial nerve deficit.      Motor: No tremor.      Coordination: Coordination normal.      Gait: Gait normal.   Psychiatric:         Behavior: Behavior normal.         Thought Content: Thought content normal.         Judgment: Judgment normal.         Assessment:       1. Annual physical exam    2. Lumbar disc herniation    3. Major depressive disorder, single episode, moderate    4. Class 3 severe obesity due to excess calories with serious comorbidity and body mass index (BMI) of 45.0 to 49.9 in adult    5. Hypertension, essential    6. Prostate cancer screening    7. Colon cancer screening    8. Obstructive sleep apnea    9. Hyperlipidemia, unspecified hyperlipidemia type        Plan:     Medication List with Changes/Refills   Current Medications    ASPIRIN (ECOTRIN) 81 MG EC TABLET    Take 81 mg by mouth once daily.    CLONAZEPAM (KLONOPIN) 0.5 MG TABLET    Take 1 tablet (0.5 mg total) by mouth 2 (two) times a day.    DULOXETINE (CYMBALTA) 30 MG CAPSULE    Take 30 mg by mouth once daily. Plus 60 mg for total daily 90 mg    DULOXETINE (CYMBALTA) 60 MG CAPSULE    Take 90 mg by mouth once daily.    MULTIVITAMIN CAPSULE    Take 1 capsule by  mouth once daily.    TADALAFIL (CIALIS) 20 MG TAB    Take 1 tablet (20 mg total) by mouth daily as needed.    TOPIRAMATE (TOPAMAX) 50 MG TABLET    Take 1 tablet (50 mg total) by mouth 2 (two) times daily.   Changed and/or Refilled Medications    Modified Medication Previous Medication    AMLODIPINE (NORVASC) 10 MG TABLET amLODIPine (NORVASC) 10 MG tablet       Take 1 tablet (10 mg total) by mouth once daily.    TAKE 1 TABLET (10 MG TOTAL) BY MOUTH ONCE DAILY.    VALSARTAN (DIOVAN) 320 MG TABLET valsartan (DIOVAN) 320 MG tablet       Take 1 tablet (320 mg total) by mouth once daily.    Take 1 tablet (320 mg total) by mouth once daily.   Discontinued Medications    CLONAZEPAM (KLONOPIN) 0.5 MG TABLET    Take 0.25 mg by mouth once daily.    DULOXETINE (CYMBALTA) 30 MG CAPSULE    Take 30 mg by mouth once daily.    NYSTATIN-TRIAMCINOLONE (MYCOLOG II) CREAM    Apply topically 2 (two) times daily.     1. Annual physical exam  -     Hemoglobin A1C; Future; Expected date: 01/19/2024  -     Comprehensive Metabolic Panel; Future; Expected date: 01/19/2024  -     Lipid Panel; Future; Expected date: 01/19/2024  -     PSA, Screening; Future; Expected date: 01/19/2024  -     Hepatitis C Antibody; Future; Expected date: 01/19/2024    2. Lumbar disc herniation  Overview:  -MRI 12/28/2016, evaluated by NS, non-op management with improvement ([id number])  -re-eval 4/2019, non-op    Orders:  -     Ambulatory referral/consult to Neurosurgery; Future; Expected date: 01/26/2024    3. Major depressive disorder, single episode, moderate  Overview:  -managed by outside psychiatrist    Assessment & Plan:  -stable      4. Class 3 severe obesity due to excess calories with serious comorbidity and body mass index (BMI) of 45.0 to 49.9 in adult    5. Hypertension, essential  -     Comprehensive Metabolic Panel; Future; Expected date: 01/19/2024  -     amLODIPine (NORVASC) 10 MG tablet; Take 1 tablet (10 mg total) by mouth once daily.  Dispense: 90  tablet; Refill: 3  -     valsartan (DIOVAN) 320 MG tablet; Take 1 tablet (320 mg total) by mouth once daily.  Dispense: 90 tablet; Refill: 3    6. Prostate cancer screening  -     PSA, Screening; Future; Expected date: 01/19/2024    7. Colon cancer screening  -     Ambulatory referral/consult to Endo Procedure ; Future; Expected date: 01/20/2024    8. Obstructive sleep apnea  Overview:  -on APAP followed in sleep clinic      9. Hyperlipidemia, unspecified hyperlipidemia type  Assessment & Plan:  -approaching risk threshold for discussion of intervention, await labs this year        See meds, orders, follow up, routing and instructions sections of encounter and AVS. Discussed with patient and provided on AVS.    Discussed diet and exercise as therapeutic modalities for metabolic and other conditions. Provided patient information, which are included as links on the AVS for detailed information.    Lab Results   Component Value Date     09/28/2022    K 4.0 09/28/2022     09/28/2022    BUN 9 09/28/2022    CREATININE 0.8 09/28/2022    GLU 93 09/28/2022    HGBA1C 5.2 07/27/2021    AST 23 09/28/2022    ALT 52 (H) 09/28/2022    ALBUMIN 4.4 09/28/2022    PROT 7.4 09/28/2022    BILITOT 0.6 09/28/2022    CHOL 215 (H) 09/28/2022    HDL 37 (L) 09/28/2022    LDLCALC 143.4 09/28/2022    TRIG 173 (H) 09/28/2022    WBC 10.42 04/30/2019    HGB 15.5 04/30/2019    HCT 45.5 04/30/2019     (H) 04/30/2019    PSA 1.1 09/28/2022    TSH 1.056 09/28/2022

## 2024-01-19 NOTE — PATIENT INSTRUCTIONS
Schedule lab orders for today.      If not contacted in a couple weeks by colonoscopy scheduling department - call Colonoscopy Scheduling Number - [phone].

## 2024-01-20 ENCOUNTER — TELEPHONE (OUTPATIENT)
Dept: INTERNAL MEDICINE | Facility: CLINIC | Age: 49
End: 2024-01-20
Payer: COMMERCIAL

## 2024-01-20 DIAGNOSIS — R97.20 RISING PSA LEVEL: Primary | ICD-10-CM

## 2024-01-20 DIAGNOSIS — R73.03 PREDIABETES: ICD-10-CM

## 2024-01-20 NOTE — TELEPHONE ENCOUNTER
Please call patient and explain that test(s) show A1c was a little high - prediabetes. PSA went up a little from last year.    Please see orders for A1C and please schedule in 6 months.     ALSO    I would like to refer to the urology department for further evaluation and treatment. Please schedule.    Thank you.

## 2024-01-22 ENCOUNTER — TELEPHONE (OUTPATIENT)
Dept: INTERNAL MEDICINE | Facility: CLINIC | Age: 49
End: 2024-01-22
Payer: COMMERCIAL

## 2024-01-22 NOTE — TELEPHONE ENCOUNTER
----- Message from Rashmi France sent at 1/22/2024  1:12 PM CST -----  Contact: [phone]@[email or web address]  Patient is returning a phone call.yes   Who left a message for the patient: Brenda Garcia RN  Does patient know what this is regarding:  yes   Would you like a call back, or a response through your [facility] portal?:   call back   Comments:

## 2024-01-26 ENCOUNTER — TELEPHONE (OUTPATIENT)
Dept: NEUROSURGERY | Facility: CLINIC | Age: 49
End: 2024-01-26
Payer: COMMERCIAL

## 2024-01-26 DIAGNOSIS — M51.26 LUMBAR DISC HERNIATION: Primary | ICD-10-CM

## 2024-01-28 ENCOUNTER — ON-DEMAND VIRTUAL (OUTPATIENT)
Dept: URGENT CARE | Facility: CLINIC | Age: 49
End: 2024-01-28
Payer: COMMERCIAL

## 2024-01-28 DIAGNOSIS — H92.09 OTALGIA, UNSPECIFIED LATERALITY: Primary | ICD-10-CM

## 2024-01-28 PROCEDURE — 99213 OFFICE O/P EST LOW 20 MIN: CPT | Mod: 95,,, | Performed by: NURSE PRACTITIONER

## 2024-01-28 RX ORDER — PREDNISONE 20 MG/1
20 TABLET ORAL DAILY
Qty: 5 TABLET | Refills: 0 | Status: SHIPPED | OUTPATIENT
Start: 2024-01-28 | End: 2024-02-02

## 2024-01-28 NOTE — PROGRESS NOTES
Subjective:      Patient ID: Sarkis Saez Jr. is a 49 y.o. male.    Vitals:  vitals were not taken for this visit.     Chief Complaint: URI      Visit Type: TELE AUDIOVISUAL    Present with the patient at the time of consultation: TELEMED PRESENT WITH PATIENT: None    Past Medical History:   Diagnosis Date    Anxiety     Hypertension     Obesity      History reviewed. No pertinent surgical history.  Review of patient's allergies indicates:  No Known Allergies  Current Outpatient Medications on File Prior to Visit   Medication Sig Dispense Refill    amLODIPine (NORVASC) 10 MG tablet Take 1 tablet (10 mg total) by mouth once daily. 90 tablet 3    aspirin (ECOTRIN) 81 MG EC tablet Take 81 mg by mouth once daily.      clonazePAM (KLONOPIN) 0.5 MG tablet Take 1 tablet (0.5 mg total) by mouth 2 (two) times a day. 60 tablet 5    DULoxetine (CYMBALTA) 30 MG capsule Take 30 mg by mouth once daily. Plus 60 mg for total daily 90 mg      DULoxetine (CYMBALTA) 60 MG capsule Take 90 mg by mouth once daily.  1    multivitamin capsule Take 1 capsule by mouth once daily.      tadalafiL (CIALIS) 20 MG Tab Take 1 tablet (20 mg total) by mouth daily as needed. 30 tablet 0    topiramate (TOPAMAX) 50 MG tablet Take 1 tablet (50 mg total) by mouth 2 (two) times daily. 180 tablet 0    valsartan (DIOVAN) 320 MG tablet Take 1 tablet (320 mg total) by mouth once daily. 90 tablet 3     No current facility-administered medications on file prior to visit.     Family History   Problem Relation Age of Onset    Diabetes Father     Hypertension Father     Heart disease Maternal Grandfather     Heart disease Paternal Grandfather        Medications Ordered                [id number] - [place] - [address] AT [facility] [address]   [address] [address]    Telephone: [phone]   Fax: [phone]   Hours: Open 24 hours                         E-Prescribed (1 of 1)               predniSONE (DELTASONE) 20 MG tablet    Sig: Take 1 tablet (20 mg total) by mouth once daily. for 5 days       Start: 1/28/24     Quantity: 5 tablet Refills: 0                           Ohs Peq Odvv Intake    1/28/2024  3:13 PM CST - Filed by Patient   What is your current physical address in the event of a medical emergency? [address] [place]   Are you able to take your vital signs? No   Please attach any relevant images or files          50 yo male with c/o right ear pain. He states he has uri symptoms including cough. He states he gets annual sinus infections.     URI   Associated symptoms include congestion, coughing and ear pain. Pertinent negatives include no dysuria, headaches or wheezing.       Constitution: Negative.   HENT:  Positive for ear pain, congestion and postnasal drip.    Cardiovascular: Negative.    Eyes: Negative.    Respiratory:  Positive for cough and sputum production. Negative for bloody sputum, shortness of breath, stridor and wheezing.    Gastrointestinal: Negative.  Negative for bowel incontinence.   Endocrine: negative.   Genitourinary: Negative.  Negative for dysuria, flank pain, bladder incontinence and pelvic pain.   Musculoskeletal: Negative.  Negative for pain, abnormal ROM of joint and back pain.   Skin: Negative.    Allergic/Immunologic: Negative.    Neurological: Negative.  Negative for headaches.   Hematologic/Lymphatic: Negative.    Psychiatric/Behavioral: Negative.          Objective:   The physical exam was conducted virtually.  Physical Exam   Constitutional: He is oriented to person, place, and time. He appears well-developed.   HENT:   Head: Normocephalic and atraumatic.   Ears:   Right Ear: Hearing, tympanic membrane and external ear normal.   Left Ear: Hearing, tympanic membrane and external ear normal.   Nose: Rhinorrhea and congestion present.   Mouth/Throat: Uvula is midline, oropharynx is clear and moist and mucous membranes are normal.   Eyes:  Conjunctivae and EOM are normal. Pupils are equal, round, and reactive to light.   Neck: Neck supple.   Cardiovascular: Normal rate.   Pulmonary/Chest: Effort normal and breath sounds normal.   Musculoskeletal: Normal range of motion.         General: Normal range of motion.   Neurological: He is alert and oriented to person, place, and time.   Skin: Skin is warm.   Psychiatric: His behavior is normal. Thought content normal.   Nursing note and vitals reviewed.      Assessment:     1. Otalgia, unspecified laterality        Plan:   PLEASE READ YOUR DISCHARGE INSTRUCTIONS ENTIRELY AS IT CONTAINS IMPORTANT INFORMATION.      Please drink plenty of fluids.    Please get plenty of rest.    Please return here or go to the Emergency Department for any concerns or worsening of condition.    Please take an over the counter antihistamine medication (allegra/Claritin/Zyrtec) of your choice as directed.    Try an over the counter decongestant like Mucinex D or Sudafed. You buy this behind the pharmacy counter    If you do have Hypertension or palpitations, it is safe to take Coricidin HBP for relief of sinus symptoms.    If not allergic, please take over the counter Tylenol (Acetaminophen) and/or Motrin (Ibuprofen) as directed for control of pain and/or fever.  Please follow up with your primary care doctor or specialist as needed.    Sore throat recommendations: Warm fluids, warm salt water gargles, throat lozenges, tea, honey, soup, rest, hydration.    Use over the counter flonase: one spray each nostril twice daily OR two sprays each nostril once daily.     Sinus rinses DO NOT USE TAP WATER, if you must, water must be a rolling boil for 1 minute, let it cool, then use.  May use distilled water, or over the counter nasal saline rinses.  Vics vapor rub in shower to help open nasal passages.  May use nasal gel to keep passages moisturized.  May use Nasal saline sprays during the day for added relief of congestion.   For those  who go to the gym, please do not use the sauna or steam room now to clear sinuses.    If you  smoke, please stop smoking.      Please return or see your primary care doctor if you develop new or worsening symptoms.     Please arrange follow up with your primary medical clinic as soon as possible. You must understand that you've received an Urgent Care treatment only and that you may be released before all of your medical problems are known or treated. You, the patient, will arrange for follow up as instructed. If your symptoms worsen or fail to improve you should go to the Emergency Room.      Otalgia, unspecified laterality  -     predniSONE (DELTASONE) 20 MG tablet; Take 1 tablet (20 mg total) by mouth once daily. for 5 days  Dispense: 5 tablet; Refill: 0

## 2024-01-30 ENCOUNTER — OFFICE VISIT (OUTPATIENT)
Dept: NEUROSURGERY | Facility: CLINIC | Age: 49
End: 2024-01-30
Payer: COMMERCIAL

## 2024-01-30 ENCOUNTER — HOSPITAL ENCOUNTER (OUTPATIENT)
Dept: RADIOLOGY | Facility: HOSPITAL | Age: 49
Discharge: HOME | End: 2024-01-30
Attending: PHYSICIAN ASSISTANT
Payer: COMMERCIAL

## 2024-01-30 VITALS
WEIGHT: 315 LBS | HEART RATE: 77 BPM | BODY MASS INDEX: 44.1 KG/M2 | HEIGHT: 71 IN | DIASTOLIC BLOOD PRESSURE: 76 MMHG | SYSTOLIC BLOOD PRESSURE: 144 MMHG

## 2024-01-30 DIAGNOSIS — G89.29 CHRONIC RIGHT-SIDED LOW BACK PAIN WITH RIGHT-SIDED SCIATICA: Primary | ICD-10-CM

## 2024-01-30 DIAGNOSIS — M54.41 CHRONIC RIGHT-SIDED LOW BACK PAIN WITH RIGHT-SIDED SCIATICA: Primary | ICD-10-CM

## 2024-01-30 DIAGNOSIS — M54.16 LUMBAR RADICULOPATHY: ICD-10-CM

## 2024-01-30 DIAGNOSIS — M51.26 LUMBAR DISC HERNIATION: ICD-10-CM

## 2024-01-30 DIAGNOSIS — M47.26 OTHER SPONDYLOSIS WITH RADICULOPATHY, LUMBAR REGION: ICD-10-CM

## 2024-01-30 DIAGNOSIS — M51.36 DDD (DEGENERATIVE DISC DISEASE), LUMBAR: ICD-10-CM

## 2024-01-30 DIAGNOSIS — M54.9 DORSALGIA, UNSPECIFIED: ICD-10-CM

## 2024-01-30 PROCEDURE — 3044F HG A1C LEVEL LT 7.0%: CPT | Mod: CPTII,S$GLB,, | Performed by: PHYSICIAN ASSISTANT

## 2024-01-30 PROCEDURE — 99204 OFFICE O/P NEW MOD 45 MIN: CPT | Mod: S$GLB,,, | Performed by: PHYSICIAN ASSISTANT

## 2024-01-30 PROCEDURE — 3077F SYST BP >= 140 MM HG: CPT | Mod: CPTII,S$GLB,, | Performed by: PHYSICIAN ASSISTANT

## 2024-01-30 PROCEDURE — 99999 PR PBB SHADOW E&M-EST. PATIENT-LVL IV: CPT | Mod: PBBFAC,,, | Performed by: PHYSICIAN ASSISTANT

## 2024-01-30 PROCEDURE — 3008F BODY MASS INDEX DOCD: CPT | Mod: CPTII,S$GLB,, | Performed by: PHYSICIAN ASSISTANT

## 2024-01-30 PROCEDURE — 1159F MED LIST DOCD IN RCRD: CPT | Mod: CPTII,S$GLB,, | Performed by: PHYSICIAN ASSISTANT

## 2024-01-30 PROCEDURE — 72110 X-RAY EXAM L-2 SPINE 4/>VWS: CPT | Mod: TC,FY

## 2024-01-30 PROCEDURE — 1160F RVW MEDS BY RX/DR IN RCRD: CPT | Mod: CPTII,S$GLB,, | Performed by: PHYSICIAN ASSISTANT

## 2024-01-30 PROCEDURE — 4010F ACE/ARB THERAPY RXD/TAKEN: CPT | Mod: CPTII,S$GLB,, | Performed by: PHYSICIAN ASSISTANT

## 2024-01-30 PROCEDURE — 72110 X-RAY EXAM L-2 SPINE 4/>VWS: CPT | Mod: 26,,, | Performed by: RADIOLOGY

## 2024-01-30 PROCEDURE — 3078F DIAST BP <80 MM HG: CPT | Mod: CPTII,S$GLB,, | Performed by: PHYSICIAN ASSISTANT

## 2024-01-30 RX ORDER — DICLOFENAC SODIUM 75 MG/1
75 TABLET, DELAYED RELEASE ORAL 2 TIMES DAILY PRN
Qty: 60 TABLET | Refills: 2 | Status: SHIPPED | OUTPATIENT
Start: 2024-01-30

## 2024-01-30 RX ORDER — GABAPENTIN 300 MG/1
CAPSULE ORAL
Qty: 90 CAPSULE | Refills: 2 | Status: SHIPPED | OUTPATIENT
Start: 2024-01-30 | End: 2024-04-18

## 2024-01-30 NOTE — PROGRESS NOTES
Subjective:     Patient ID:  Sarkis Saez Jr. is a 49 y.o. male.    Jamie    Chief Complaint:  Back pain and right leg pain    HPI   03/15/2024    Sarkis Saez Jr. is a 49 y.o. male who presents with the above CC.  I had seen this patient in 2019 for similar symptoms.  Eventually the pain went away.  He took diclofenac and gabapentin at that time.  Patient states in August 2023 he started having pain in the right low back right buttock and right lateral lower leg.  It has gotten better since then but still happens especially in certain positions that he is in.  He describes the pain as burning sensation in the lower leg.  It tends to be worse in the morning and when putting pressure on the right side and better with standing.  Denies any left leg pain or paresthesias.  No weakness in the legs.    Patient has had PT in the past.  No ESIs.  No spine surgery.  Patient is currently taking ibuprofen PRN.    Patient denies any recent accidents or trauma, no saddle anesthesias, and no bowel or bladder incontinence.      Review of Systems:    Review of Systems   Constitutional:  Negative for chills, diaphoresis, fever, malaise/fatigue and weight loss.   HENT:  Positive for sinus pain. Negative for congestion, ear discharge, ear pain, hearing loss, nosebleeds, sore throat and tinnitus.    Eyes:  Negative for blurred vision, double vision, photophobia, pain, discharge and redness.   Respiratory:  Positive for cough. Negative for hemoptysis, sputum production, shortness of breath, wheezing and stridor.    Cardiovascular:  Negative for chest pain, palpitations, orthopnea, leg swelling and PND.   Gastrointestinal:  Negative for abdominal pain, blood in stool, constipation, diarrhea, heartburn, melena, nausea and vomiting.   Genitourinary:  Negative for dysuria, flank pain, frequency, hematuria and urgency.   Musculoskeletal:  Positive for back pain. Negative for falls, joint pain, myalgias and neck pain.   Skin:   Negative for itching and rash.   Neurological:  Negative for dizziness, tingling, tremors, sensory change, speech change, seizures, loss of consciousness, weakness and headaches.   Endo/Heme/Allergies:  Negative for environmental allergies and polydipsia. Does not bruise/bleed easily.   Psychiatric/Behavioral:  Positive for depression. Negative for hallucinations, memory loss and substance abuse. The patient is nervous/anxious. The patient does not have insomnia.          Past Medical History:   Diagnosis Date    Anxiety     Hypertension     Obesity      History reviewed. No pertinent surgical history.  Current Outpatient Medications on File Prior to Visit   Medication Sig Dispense Refill    amLODIPine (NORVASC) 10 MG tablet Take 1 tablet (10 mg total) by mouth once daily. 90 tablet 3    aspirin (ECOTRIN) 81 MG EC tablet Take 81 mg by mouth once daily.      clonazePAM (KLONOPIN) 0.5 MG tablet Take 1 tablet (0.5 mg total) by mouth 2 (two) times a day. 60 tablet 5    DULoxetine (CYMBALTA) 30 MG capsule Take 30 mg by mouth once daily. Plus 60 mg for total daily 90 mg      DULoxetine (CYMBALTA) 60 MG capsule Take 90 mg by mouth once daily.  1    multivitamin capsule Take 1 capsule by mouth once daily.      valsartan (DIOVAN) 320 MG tablet Take 1 tablet (320 mg total) by mouth once daily. 90 tablet 3     No current facility-administered medications on file prior to visit.     Review of patient's allergies indicates:   Allergen Reactions    Doxycycline Hives     Social History     Socioeconomic History    Marital status: Legally    Tobacco Use    Smoking status: Former     Types: Cigars     Quit date: 2014     Years since quittin.2    Smokeless tobacco: Never   Substance and Sexual Activity    Alcohol use: Yes   Social History Narrative    Pt is  at [employer].      Social Determinants of Health     Financial Resource Strain: Medium Risk (2024)    Overall Financial Resource Strain  "(CARDIA)     Difficulty of Paying Living Expenses: Somewhat hard   Food Insecurity: No Food Insecurity (1/31/2024)    Hunger Vital Sign     Worried About Running Out of Food in the Last Year: Never true     Ran Out of Food in the Last Year: Never true   Transportation Needs: No Transportation Needs (1/31/2024)    PRAPARE - Transportation     Lack of Transportation (Medical): No     Lack of Transportation (Non-Medical): No   Physical Activity: Insufficiently Active (1/31/2024)    Exercise Vital Sign     Days of Exercise per Week: 2 days     Minutes of Exercise per Session: 20 min   Stress: No Stress Concern Present (1/31/2024)    [employer] [place] [employer] - Occupational Stress Questionnaire     Feeling of Stress : Only a little   Social Connections: Unknown (1/31/2024)    Social Connection and Isolation Panel [NHANES]     Frequency of Communication with Friends and Family: More than three times a week     Frequency of Social Gatherings with Friends and Family: More than three times a week     Active Member of Clubs or Organizations: No     Attends Club or Organization Meetings: Patient declined     Marital Status:    Housing Stability: Low Risk  (1/31/2024)    Housing Stability Vital Sign     Unable to Pay for Housing in the Last Year: No     Number of Places Lived in the Last Year: 1     Unstable Housing in the Last Year: No     Family History   Problem Relation Age of Onset    Diabetes Father     Hypertension Father     Heart disease Maternal Grandfather     Heart disease Paternal Grandfather        Objective:      Vitals:    01/30/24 1458   BP: (!) 144/76   Pulse: 77   Weight: (!) 155.5 kg (342 lb 13 oz)   Height: 5' 11" (1.803 m)   PainSc:   3   PainLoc: Back         Physical Exam:      General:  Sarkis Saez Jr. is well-developed, well-nourished, appears stated age, in no acute distress, alert and oriented to person, place, and time.    Pulmonary/Chest:  Respiratory effort " normal  Abdominal: Exhibits no distension  Psychiatric:  Normal mood and affect.  Behavior is normal.  Judgement and thought content normal      Musculoskeletal:    Patient is able to walk on heels and toes without difficulty.    Lumbar ROM:   No pain in lumbar extension and right lateral bending.  Pain in flexion and right lateral bending.    Lumbar Spine Inspection:  Normal with no surgical scars and no visible rashes.    Lumbar Spine Palpation:  No tenderness to low back palpation.    SI Joint Palpation:  No tenderness to SI Joint palpation.    Straight Leg Raise:  Negative right and left SLR.      Neurological:     Muscle strength against resistance:     Right Left   Hip flexion  5 / 5 5 / 5   Hip extension 5 / 5 5 / 5   Hip abduction 5 / 5 5 / 5   Hip adduction  5 / 5 5 / 5   Knee extension  5 / 5 5 / 5   Knee flexion 5 / 5 5 / 5   Dorsiflexion  5 / 5 5 / 5   EHL  5 / 5 5 / 5   Plantar flexion  5 / 5 5 / 5   Inversion of the feet 5 / 5 5 / 5   Eversion of the feet  5 / 5 5 / 5       Reflexes:     Right Left   Patellar 3+ 2+   Achilles 2+ 2+     Clonus:  Negative bilaterally    On gross examination of the bilateral upper extremities, patient has full painfree ROM with no signs of clubbing, cyanosis, edema, or weakness.       XRAY/MRI Interpretation:     Lumbar spine xrays were personally reviewed today.  No fractures.  No movement on flexion and extension.  L5-S1DDD.    Lumbar spine MRI was personally reviewed today from 2016.  Degenerative disc at L5-S1.  Right L4-5 disc herniation.      Assessment:          1. Chronic right-sided low back pain with right-sided sciatica    2. Lumbar disc herniation    3. Other spondylosis with radiculopathy, lumbar region    4. DDD (degenerative disc disease), lumbar    5. Lumbar radiculopathy    6. Dorsalgia, unspecified            Plan:          Orders Placed This Encounter    MRI Lumbar Spine Without Contrast    gabapentin (NEURONTIN) 300 MG capsule    diclofenac (VOLTAREN)  75 MG EC tablet       Pain most likely present again from disc herniation at L4-5.  Recommend trying diclofenac and gabapentin again.      He will message me if the pain persists and will order updated MRI lumbar spine at that time.    Follow-Up:  Follow up if symptoms worsen or fail to improve. If there are any questions prior to this, the patient was instructed to contact the office.       JABARI Wiggins PA-C  Neurosurgery  [facility]  03/15/2024      Addendum:   03/15/2024   MRI lumbar spine ordered.    Fu in clinic after.    JABARI Wiggins, CANDACE  Neurosurgery  [facility]

## 2024-02-07 ENCOUNTER — PATIENT MESSAGE (OUTPATIENT)
Dept: INTERNAL MEDICINE | Facility: CLINIC | Age: 49
End: 2024-02-07
Payer: COMMERCIAL

## 2024-02-07 DIAGNOSIS — N52.9 ERECTILE DYSFUNCTION, UNSPECIFIED ERECTILE DYSFUNCTION TYPE: Primary | ICD-10-CM

## 2024-02-10 RX ORDER — TADALAFIL 20 MG/1
20 TABLET ORAL DAILY PRN
Qty: 30 TABLET | Refills: 0 | Status: SHIPPED | OUTPATIENT
Start: 2024-02-10

## 2024-02-12 ENCOUNTER — OFFICE VISIT (OUTPATIENT)
Dept: URGENT CARE | Facility: CLINIC | Age: 49
End: 2024-02-12
Payer: COMMERCIAL

## 2024-02-12 VITALS
OXYGEN SATURATION: 96 % | WEIGHT: 315 LBS | RESPIRATION RATE: 20 BRPM | BODY MASS INDEX: 44.1 KG/M2 | HEIGHT: 71 IN | TEMPERATURE: 98 F | SYSTOLIC BLOOD PRESSURE: 144 MMHG | HEART RATE: 97 BPM | DIASTOLIC BLOOD PRESSURE: 95 MMHG

## 2024-02-12 DIAGNOSIS — L08.9 SKIN INFECTION: Primary | ICD-10-CM

## 2024-02-12 PROCEDURE — 99203 OFFICE O/P NEW LOW 30 MIN: CPT | Mod: S$GLB,,, | Performed by: NURSE PRACTITIONER

## 2024-02-12 RX ORDER — SULFAMETHOXAZOLE AND TRIMETHOPRIM 800; 160 MG/1; MG/1
1 TABLET ORAL 2 TIMES DAILY
Qty: 20 TABLET | Refills: 0 | Status: SHIPPED | OUTPATIENT
Start: 2024-02-12 | End: 2024-02-22

## 2024-02-12 RX ORDER — MUPIROCIN 20 MG/G
OINTMENT TOPICAL
Qty: 22 G | Refills: 1 | Status: SHIPPED | OUTPATIENT
Start: 2024-02-12 | End: 2024-02-28 | Stop reason: SDUPTHER

## 2024-02-12 NOTE — PROGRESS NOTES
"Subjective:      Patient ID: Sarkis Saez Jr. is a 49 y.o. male.    Vitals:  height is 5' 11" (1.803 m) and weight is 155.1 kg (342 lb) (abnormal). His temperature is 98.4 °F (36.9 °C). His blood pressure is 144/95 (abnormal) and his pulse is 97. His respiration is 20 and oxygen saturation is 96%.     Chief Complaint: Insect Bite    This is a 49 y.o. male   who presents today with a chief complaint of a bug bite located on his left upper thigh. He noticed the bite two days ago. He's complaining of pain and redness. He's been using neosporin and a warm compress to help relieve his symptoms.    Insect Bite  This is a new problem. The current episode started in the past 7 days. The problem occurs constantly. The problem has been gradually worsening. Pertinent negatives include no abdominal pain, anorexia, arthralgias, change in bowel habit, chest pain, chills, congestion, coughing, diaphoresis, fatigue, fever, headaches, joint swelling, myalgias, nausea, neck pain, numbness, rash, sore throat, swollen glands, urinary symptoms, vertigo, visual change, vomiting or weakness. Treatments tried: warm compress and neosporin. The treatment provided no relief.     Constitution: Negative for chills, sweating, fatigue and fever.   HENT:  Negative for congestion and sore throat.    Neck: Negative for neck pain.   Cardiovascular:  Negative for chest pain.   Respiratory:  Negative for cough.    Gastrointestinal:  Negative for abdominal pain, nausea and vomiting.   Musculoskeletal:  Positive for pain. Negative for joint pain, joint swelling and muscle ache.   Skin:  Positive for erythema (there is a 3cm area of induration under right buttock fold.  no area of fluctuance or head.). Negative for rash.   Neurological:  Negative for history of vertigo, headaches and numbness.      Objective:     Physical Exam   Constitutional: He is oriented to person, place, and time. He appears well-developed.   HENT:   Head: Normocephalic and " atraumatic. Head is without abrasion, without contusion and without laceration.   Ears:   Right Ear: External ear normal.   Left Ear: External ear normal.   Nose: Nose normal.   Mouth/Throat: Oropharynx is clear and moist and mucous membranes are normal.   Eyes: Conjunctivae, EOM and lids are normal. Pupils are equal, round, and reactive to light.   Neck: Trachea normal and phonation normal. Neck supple.   Cardiovascular: Normal rate, regular rhythm and normal heart sounds.   Pulmonary/Chest: Effort normal and breath sounds normal. No stridor. No respiratory distress.   Musculoskeletal: Normal range of motion.         General: Normal range of motion.   Neurological: He is alert and oriented to person, place, and time.   Skin: Skin is warm, dry, intact and no rash. Capillary refill takes less than 2 seconds. erythema (there is a 3cm area of induration under right buttock fold.  no area of fluctuance or head.) No abrasion, No burn, No bruising and No ecchymosis   Psychiatric: His speech is normal and behavior is normal. Judgment and thought content normal.   Nursing note and vitals reviewed.      Assessment:     1. Skin infection        Plan:     Area of induration without fluctuance.  Surrounding a hair follicle.  Will try oral and topical with frequent warm compresses.  If area becomes fluctuant advised to return for drainage.      Skin infection  -     sulfamethoxazole-trimethoprim 800-160mg (BACTRIM DS) 800-160 mg Tab; Take 1 tablet by mouth 2 (two) times daily. for 10 days  Dispense: 20 tablet; Refill: 0  -     mupirocin (BACTROBAN) 2 % ointment; Apply to affected area 3 times daily  Dispense: 22 g; Refill: 1        Patient Instructions   Please drink plenty of fluids.  Please get plenty of rest.  Please return here or go to the Emergency Department for any concerns or worsening of condition.  If you were prescribed antibiotics, please take them to completion.  If you were prescribed a narcotic medication, do not  drive or operate heavy equipment or machinery while taking these medications.  Please return here in 1-3 days for a recheck of your wound.  If not allergic, please take over the counter Tylenol (Acetaminophen) and/or Motrin (Ibuprofen) as directed for control of pain and/or fever.  Please follow up with your primary care doctor or specialist as needed.    If you  smoke, please stop smoking.

## 2024-02-12 NOTE — PATIENT INSTRUCTIONS
Please drink plenty of fluids.  Please get plenty of rest.  Please return here or go to the Emergency Department for any concerns or worsening of condition.  If you were prescribed antibiotics, please take them to completion.  If you were prescribed a narcotic medication, do not drive or operate heavy equipment or machinery while taking these medications.  Please return here in 1-3 days for a recheck of your wound.  If not allergic, please take over the counter Tylenol (Acetaminophen) and/or Motrin (Ibuprofen) as directed for control of pain and/or fever.  Please follow up with your primary care doctor or specialist as needed.    If you  smoke, please stop smoking.

## 2024-02-14 ENCOUNTER — OFFICE VISIT (OUTPATIENT)
Dept: URGENT CARE | Facility: CLINIC | Age: 49
End: 2024-02-14
Payer: COMMERCIAL

## 2024-02-14 VITALS
BODY MASS INDEX: 44.1 KG/M2 | WEIGHT: 315 LBS | OXYGEN SATURATION: 95 % | TEMPERATURE: 100 F | RESPIRATION RATE: 20 BRPM | HEART RATE: 110 BPM | HEIGHT: 71 IN | SYSTOLIC BLOOD PRESSURE: 150 MMHG | DIASTOLIC BLOOD PRESSURE: 85 MMHG

## 2024-02-14 DIAGNOSIS — L02.419 CELLULITIS AND ABSCESS OF LOWER EXTREMITY: Primary | ICD-10-CM

## 2024-02-14 DIAGNOSIS — L03.119 CELLULITIS AND ABSCESS OF LOWER EXTREMITY: Primary | ICD-10-CM

## 2024-02-14 PROCEDURE — 99214 OFFICE O/P EST MOD 30 MIN: CPT | Mod: 25,S$GLB,, | Performed by: FAMILY MEDICINE

## 2024-02-14 PROCEDURE — 87070 CULTURE OTHR SPECIMN AEROBIC: CPT | Performed by: FAMILY MEDICINE

## 2024-02-14 PROCEDURE — 87186 SC STD MICRODIL/AGAR DIL: CPT | Performed by: FAMILY MEDICINE

## 2024-02-14 PROCEDURE — 96372 THER/PROPH/DIAG INJ SC/IM: CPT | Mod: S$GLB,,, | Performed by: FAMILY MEDICINE

## 2024-02-14 PROCEDURE — 87077 CULTURE AEROBIC IDENTIFY: CPT | Performed by: FAMILY MEDICINE

## 2024-02-14 RX ORDER — SULFAMETHOXAZOLE AND TRIMETHOPRIM 800; 160 MG/1; MG/1
2 TABLET ORAL 2 TIMES DAILY
Qty: 20 TABLET | Refills: 0 | Status: SHIPPED | OUTPATIENT
Start: 2024-02-14 | End: 2024-02-19

## 2024-02-14 RX ORDER — CEFTRIAXONE 1 G/1
2 INJECTION, POWDER, FOR SOLUTION INTRAMUSCULAR; INTRAVENOUS
Status: COMPLETED | OUTPATIENT
Start: 2024-02-14 | End: 2024-02-14

## 2024-02-14 RX ADMIN — CEFTRIAXONE 2 G: 1 INJECTION, POWDER, FOR SOLUTION INTRAMUSCULAR; INTRAVENOUS at 08:02

## 2024-02-15 NOTE — PROGRESS NOTES
"Subjective:      Patient ID: Sarkis Saez Jr. is a 49 y.o. male.    Vitals:  height is 5' 11" (1.803 m) and weight is 154.7 kg (341 lb) (abnormal). His oral temperature is 100.2 °F (37.9 °C). His blood pressure is 150/85 (abnormal) and his pulse is 110. His respiration is 20 and oxygen saturation is 95%.     Chief Complaint: Follow-up    Patient was seen on Monday for a skin infection. Patient wants to follow up to see if the infected area is improving. Pt thinks that the infected area may be draining.    Seen 2 days ago with cellulitis, on Bactrim DS twice daily.  No fever noted at home.     Follow-up  This is a recurrent problem. The current episode started in the past 7 days. The problem occurs constantly. The problem has been unchanged. Nothing aggravates the symptoms. Treatments tried: bactrim and bactraban. The treatment provided mild relief.     ROS   Objective:     Physical Exam   Constitutional: He is oriented to person, place, and time. He appears well-developed.  Non-toxic appearance. He does not appear ill. No distress.   HENT:   Head: Normocephalic and atraumatic.   Ears:   Right Ear: External ear normal.   Left Ear: External ear normal.   Cardiovascular: Normal rate and regular rhythm.   Pulmonary/Chest: Effort normal. No stridor. No respiratory distress. He has no wheezes. He has no rhonchi. He has no rales.   Abdominal: Normal appearance.   Neurological: He is alert and oriented to person, place, and time.   Skin: Skin is not diaphoretic.         Comments: 4 inch in diameter area of erythema and induration to left upper inner thigh.  There does seem to be a central area of fluctuance.    After cleaned with Betadine, area was anesthetized with 1% xylocaine with epi. #11 blade used to make incision with bloody and purulent return.  Hemostat used to disrupt septations.  Packed with iodoform   Psychiatric: His behavior is normal. Thought content normal.   Nursing note and vitals " reviewed.      Assessment:     1. Cellulitis and abscess of lower extremity        Plan:       Cellulitis and abscess of lower extremity  -     cefTRIAXone injection 2 g  -     sulfamethoxazole-trimethoprim 800-160mg (BACTRIM DS) 800-160 mg Tab; Take 2 tablets by mouth 2 (two) times daily. for 5 days  Dispense: 20 tablet; Refill: 0  -     CULTURE, AEROBIC  (SPECIFY SOURCE)    INCREASE THE ANTIBIOTIC BY MOUTH TO 2 PILLS TWICE DAILY.    I SUGGEST YOU GET IN THE SHOWER TWICE DAILY AND ALLOW THE WARM WATER TO RUN OVER THE AREA TO HELP FACILITATE DRAINAGE.    OTHERWISE, KEEP DRY AND CLEAN AND CHANGE THE DRESSING FREQUENTLY, AS WE EXPECT THIS TO CONTINUE TO DRAIN.    RECHECK IN 48 HOUR TO REMOVE PACKING.    WITH FEVER INCREASED PAIN OR INCREASED REDNESS, PROCEED TO THE EMERGENCY ROOM FOR FURTHER EVALUATION AND TREAT

## 2024-02-16 ENCOUNTER — OFFICE VISIT (OUTPATIENT)
Dept: URGENT CARE | Facility: CLINIC | Age: 49
End: 2024-02-16
Payer: COMMERCIAL

## 2024-02-16 VITALS
DIASTOLIC BLOOD PRESSURE: 71 MMHG | HEIGHT: 71 IN | OXYGEN SATURATION: 95 % | SYSTOLIC BLOOD PRESSURE: 187 MMHG | WEIGHT: 315 LBS | TEMPERATURE: 99 F | HEART RATE: 84 BPM | BODY MASS INDEX: 44.1 KG/M2 | RESPIRATION RATE: 18 BRPM

## 2024-02-16 DIAGNOSIS — L03.119 CELLULITIS AND ABSCESS OF LOWER EXTREMITY: Primary | ICD-10-CM

## 2024-02-16 DIAGNOSIS — L02.419 CELLULITIS AND ABSCESS OF LOWER EXTREMITY: Primary | ICD-10-CM

## 2024-02-16 PROCEDURE — 99212 OFFICE O/P EST SF 10 MIN: CPT | Mod: S$GLB,,,

## 2024-02-16 NOTE — PATIENT INSTRUCTIONS
- Continue taking Bactrim as prescribed.   - Will call with wound culture results or you can call us if you see results sooner.        - Acetaminophen (tylenol) or Ibuprofen (advil,motrin) as directed as needed for fever/pain. Avoid tylenol if you have a history of liver disease. Do not take ibuprofen if you have a history of GI bleeding, kidney disease, or if you take blood thinners.   - Ibuprofen dosing for adults: 400 mg by mouth every 4-6 hours as needed. Max: 2400 mg/day; Info: use lowest effective dose, shortest effective treatment duration; give w/ food if GI upset occurs.  - Tylenol dosing for adults: [By mouth route, immediate-release form] Dose: 325-1000 mg by mouth every 4-6h as needed; Max: 1 g/4h and 4 g/day from all sources. [By mouth route, extended-release form] Dose: 650-1300 mg Extended Release by mouth every 8h as needed; Max: 4 g/day from all sources.     - You must understand that you have received an Urgent Care treatment only and that you may be released before all of your medical problems are known or treated.   - You, the patient, will arrange for follow up care as instructed.   - If your condition worsens or fails to improve we recommend that you receive another evaluation at the ER immediately or contact your PCP to discuss your concerns or return here.   - Follow up with your PCP or specialty clinic as directed in the next 1-2 weeks if not improved or as needed.  You can call [phone] to schedule an appointment with the appropriate provider.    If your symptoms do not improve or worsen, go to the emergency room immediately.

## 2024-02-17 DIAGNOSIS — Z22.322 MRSA (METHICILLIN RESISTANT STAPH AUREUS) CULTURE POSITIVE: Primary | ICD-10-CM

## 2024-02-17 LAB — BACTERIA SPEC AEROBE CULT: ABNORMAL

## 2024-02-17 RX ORDER — SULFAMETHOXAZOLE AND TRIMETHOPRIM 800; 160 MG/1; MG/1
1 TABLET ORAL 2 TIMES DAILY
Qty: 20 TABLET | Refills: 0 | Status: SHIPPED | OUTPATIENT
Start: 2024-02-17 | End: 2024-03-01 | Stop reason: SDUPTHER

## 2024-02-28 ENCOUNTER — OFFICE VISIT (OUTPATIENT)
Dept: INFECTIOUS DISEASES | Facility: CLINIC | Age: 49
End: 2024-02-28
Payer: COMMERCIAL

## 2024-02-28 ENCOUNTER — LAB VISIT (OUTPATIENT)
Dept: LAB | Facility: HOSPITAL | Age: 49
End: 2024-02-28
Attending: INTERNAL MEDICINE
Payer: COMMERCIAL

## 2024-02-28 VITALS
DIASTOLIC BLOOD PRESSURE: 85 MMHG | HEIGHT: 72 IN | TEMPERATURE: 98 F | SYSTOLIC BLOOD PRESSURE: 139 MMHG | BODY MASS INDEX: 42.66 KG/M2 | WEIGHT: 315 LBS | HEART RATE: 91 BPM

## 2024-02-28 DIAGNOSIS — L08.9 SKIN INFECTION: ICD-10-CM

## 2024-02-28 LAB
ANION GAP SERPL CALC-SCNC: 9 MMOL/L (ref 8–16)
BUN SERPL-MCNC: 13 MG/DL (ref 6–20)
CALCIUM SERPL-MCNC: 9.8 MG/DL (ref 8.7–10.5)
CHLORIDE SERPL-SCNC: 105 MMOL/L (ref 95–110)
CO2 SERPL-SCNC: 26 MMOL/L (ref 23–29)
CREAT SERPL-MCNC: 0.9 MG/DL (ref 0.5–1.4)
EST. GFR  (NO RACE VARIABLE): >60 ML/MIN/1.73 M^2
GLUCOSE SERPL-MCNC: 115 MG/DL (ref 70–110)
POTASSIUM SERPL-SCNC: 4.6 MMOL/L (ref 3.5–5.1)
SODIUM SERPL-SCNC: 140 MMOL/L (ref 136–145)

## 2024-02-28 PROCEDURE — 4010F ACE/ARB THERAPY RXD/TAKEN: CPT | Mod: CPTII,S$GLB,, | Performed by: INTERNAL MEDICINE

## 2024-02-28 PROCEDURE — 3044F HG A1C LEVEL LT 7.0%: CPT | Mod: CPTII,S$GLB,, | Performed by: INTERNAL MEDICINE

## 2024-02-28 PROCEDURE — 3008F BODY MASS INDEX DOCD: CPT | Mod: CPTII,S$GLB,, | Performed by: INTERNAL MEDICINE

## 2024-02-28 PROCEDURE — 99999 PR PBB SHADOW E&M-EST. PATIENT-LVL IV: CPT | Mod: PBBFAC,,, | Performed by: INTERNAL MEDICINE

## 2024-02-28 PROCEDURE — 3079F DIAST BP 80-89 MM HG: CPT | Mod: CPTII,S$GLB,, | Performed by: INTERNAL MEDICINE

## 2024-02-28 PROCEDURE — 36415 COLL VENOUS BLD VENIPUNCTURE: CPT | Performed by: INTERNAL MEDICINE

## 2024-02-28 PROCEDURE — 99203 OFFICE O/P NEW LOW 30 MIN: CPT | Mod: S$GLB,,, | Performed by: INTERNAL MEDICINE

## 2024-02-28 PROCEDURE — 3075F SYST BP GE 130 - 139MM HG: CPT | Mod: CPTII,S$GLB,, | Performed by: INTERNAL MEDICINE

## 2024-02-28 PROCEDURE — 80048 BASIC METABOLIC PNL TOTAL CA: CPT | Performed by: INTERNAL MEDICINE

## 2024-02-28 RX ORDER — DOXYCYCLINE 100 MG/1
100 CAPSULE ORAL 2 TIMES DAILY
Qty: 28 CAPSULE | Refills: 0 | Status: SHIPPED | OUTPATIENT
Start: 2024-02-28 | End: 2024-03-01

## 2024-02-28 RX ORDER — CHLORHEXIDINE GLUCONATE 40 MG/ML
SOLUTION TOPICAL DAILY PRN
Qty: 946 ML | Refills: 11 | Status: SHIPPED | OUTPATIENT
Start: 2024-02-28 | End: 2025-02-27

## 2024-02-28 RX ORDER — MUPIROCIN 20 MG/G
OINTMENT TOPICAL DAILY
Qty: 22 G | Refills: 11 | Status: SHIPPED | OUTPATIENT
Start: 2024-02-28 | End: 2025-02-27

## 2024-02-28 NOTE — PROGRESS NOTES
"INFECTIOUS DISEASE CLINIC  2/28/24     Subjective:      Chief Complaint:   Chief Complaint   Patient presents with    mrsa       History of Present Illness:    Patient Sarkis Saez Jr. is a 49 y.o. male who presents today for +culture for mrsa. Went to urgent care for skin boil on buttox. Took bactrim, improving (also got "2 shots of an antibiotic"). Wound cultures sent and MRSA. Sciatica pain, at baseline    Component 2 wk ago   Aerobic Bacterial Culture  Abnormal   METHICILLIN RESISTANT STAPHYLOCOCCUS AUREUS  Many    Resulting Agency [facility]        Susceptibility       Methicillin resistant Staphylococcus aureus     CULTURE, AEROBIC  (SPECIFY SOURCE)     Clindamycin <=0.5 mcg/mL Sensitive     Erythromycin >4 mcg/mL Resistant     Oxacillin >2 mcg/mL Resistant     Penicillin >8 mcg/mL Resistant     Tetracycline <=4 mcg/mL Sensitive     Trimeth/Sulfa <=0.5/9.5 m... Sensitive     Vancomycin 1 mcg/mL Sensitive                     Review of Symptoms:  Constitutional: Denies fevers, chills, or weakness.  ENT: Denies dysphagia, nasal discharge, ear pain or discharge.  Cardiovascular: Denies chest pain, palpitations, orthopnea, or claudication.  Respiratory: Denies shortness of breath, cough, hemoptysis, or wheezing.  GI: Denies nausea/vomitting, hematochezia, melena, abd pain, or changes in appetite.  : Denies dysuria, incontinence, or hematuria.  Musculoskeletal: Denies joint pain or myalgias.  Skin/breast: as per hpi  Neurologic: Denies headache, dizziness, vertigo, or paresthesias.    Past Medical History:   Diagnosis Date    Anxiety     Hypertension     Obesity        History reviewed. No pertinent surgical history.    Family History   Problem Relation Age of Onset    Diabetes Father     Hypertension Father     Heart disease Maternal Grandfather     Heart disease Paternal Grandfather        Social History     Socioeconomic History    Marital status: Legally    Tobacco Use    Smoking status: Former     " Types: Cigars     Quit date: 2014     Years since quittin.2    Smokeless tobacco: Never   Substance and Sexual Activity    Alcohol use: Yes   Social History Narrative    Pt is  at [employer].      Social Determinants of Health     Financial Resource Strain: Medium Risk (2024)    Overall Financial Resource Strain (CARDIA)     Difficulty of Paying Living Expenses: Somewhat hard   Food Insecurity: No Food Insecurity (2024)    Hunger Vital Sign     Worried About Running Out of Food in the Last Year: Never true     Ran Out of Food in the Last Year: Never true   Transportation Needs: No Transportation Needs (2024)    PRAPARE - Transportation     Lack of Transportation (Medical): No     Lack of Transportation (Non-Medical): No   Physical Activity: Insufficiently Active (2024)    Exercise Vital Sign     Days of Exercise per Week: 2 days     Minutes of Exercise per Session: 20 min   Stress: No Stress Concern Present (2024)    [employer] - Occupational Stress Questionnaire     Feeling of Stress : Only a little   Social Connections: Unknown (2024)    Social Connection and Isolation Panel [NHANES]     Frequency of Communication with Friends and Family: More than three times a week     Frequency of Social Gatherings with Friends and Family: More than three times a week     Active Member of Clubs or Organizations: No     Attends Club or Organization Meetings: Patient declined     Marital Status:    Housing Stability: Low Risk  (2024)    Housing Stability Vital Sign     Unable to Pay for Housing in the Last Year: No     Number of Places Lived in the Last Year: 1     Unstable Housing in the Last Year: No       Review of patient's allergies indicates:  No Known Allergies      Objective:   VS (24h):   Vitals:    24 0847   BP: 139/85   Pulse: 91   Temp: 98.1 °F (36.7 °C)     [unfilled]  General: Afebrile, alert, comfortable,  no acute distress.   HEENT: DEEPA. EOMI, no scleral icterus.   Pulmonary: Non labored,clear to auscultation A/P/L. No wheezing, crackles, or rhonchi.  Cardiac: normal S1 & S2 w/o rubs/murmurs/gallops.   Abdominal: Non-tender, non-distended.  Extremities: Moves all extremities x 4. Small lesion on buttox with scant driange on bandaid. No surrounding erythema or tenderness or warmth  Skin: No jaundice, rashes, or visible lesions.   Neurological:  Alert and oriented x 4.     Labs:    Glucose   Date Value Ref Range Status   01/19/2024 127 (H) 70 - 110 mg/dL Final   09/28/2022 93 70 - 110 mg/dL Final   07/27/2021 105 70 - 110 mg/dL Final       Calcium   Date Value Ref Range Status   01/19/2024 10.3 8.7 - 10.5 mg/dL Final   09/28/2022 10.0 8.7 - 10.5 mg/dL Final   07/27/2021 10.4 8.7 - 10.5 mg/dL Final       Albumin   Date Value Ref Range Status   01/19/2024 4.1 3.5 - 5.2 g/dL Final   09/28/2022 4.4 3.5 - 5.2 g/dL Final   07/27/2021 4.2 3.5 - 5.2 g/dL Final       Total Protein   Date Value Ref Range Status   01/19/2024 7.6 6.0 - 8.4 g/dL Final   09/28/2022 7.4 6.0 - 8.4 g/dL Final   07/27/2021 7.7 6.0 - 8.4 g/dL Final       Sodium   Date Value Ref Range Status   01/19/2024 139 136 - 145 mmol/L Final   09/28/2022 137 136 - 145 mmol/L Final   07/27/2021 140 136 - 145 mmol/L Final       Potassium   Date Value Ref Range Status   01/19/2024 4.8 3.5 - 5.1 mmol/L Final   09/28/2022 4.0 3.5 - 5.1 mmol/L Final   07/27/2021 4.5 3.5 - 5.1 mmol/L Final       CO2   Date Value Ref Range Status   01/19/2024 25 23 - 29 mmol/L Final   09/28/2022 24 23 - 29 mmol/L Final   07/27/2021 27 23 - 29 mmol/L Final       Chloride   Date Value Ref Range Status   01/19/2024 104 95 - 110 mmol/L Final   09/28/2022 102 95 - 110 mmol/L Final   07/27/2021 104 95 - 110 mmol/L Final       BUN   Date Value Ref Range Status   01/19/2024 13 6 - 20 mg/dL Final   09/28/2022 9 6 - 20 mg/dL Final   07/27/2021 11 6 - 20 mg/dL Final       Creatinine   Date Value Ref  Range Status   01/19/2024 0.9 0.5 - 1.4 mg/dL Final   09/28/2022 0.8 0.5 - 1.4 mg/dL Final   07/27/2021 0.9 0.5 - 1.4 mg/dL Final       Alkaline Phosphatase   Date Value Ref Range Status   01/19/2024 24 (L) 55 - 135 U/L Final   09/28/2022 22 (L) 55 - 135 U/L Final   07/27/2021 23 (L) 55 - 135 U/L Final       ALT   Date Value Ref Range Status   01/19/2024 35 10 - 44 U/L Final   09/28/2022 52 (H) 10 - 44 U/L Final   07/27/2021 31 10 - 44 U/L Final       AST   Date Value Ref Range Status   01/19/2024 17 10 - 40 U/L Final   09/28/2022 23 10 - 40 U/L Final   07/27/2021 18 10 - 40 U/L Final       Total Bilirubin   Date Value Ref Range Status   01/19/2024 0.5 0.1 - 1.0 mg/dL Final     Comment:     For infants and newborns, interpretation of results should be based  on gestational age, weight and in agreement with clinical  observations.    Premature Infant recommended reference ranges:  Up to 24 hours.............<8.0 mg/dL  Up to 48 hours............<12.0 mg/dL  3-5 days..................<15.0 mg/dL  6-29 days.................<15.0 mg/dL     09/28/2022 0.6 0.1 - 1.0 mg/dL Final     Comment:     For infants and newborns, interpretation of results should be based  on gestational age, weight and in agreement with clinical  observations.    Premature Infant recommended reference ranges:  Up to 24 hours.............<8.0 mg/dL  Up to 48 hours............<12.0 mg/dL  3-5 days..................<15.0 mg/dL  6-29 days.................<15.0 mg/dL     07/27/2021 0.8 0.1 - 1.0 mg/dL Final     Comment:     For infants and newborns, interpretation of results should be based  on gestational age, weight and in agreement with clinical  observations.    Premature Infant recommended reference ranges:  Up to 24 hours.............<8.0 mg/dL  Up to 48 hours............<12.0 mg/dL  3-5 days..................<15.0 mg/dL  6-29 days.................<15.0 mg/dL         WBC   Date Value Ref Range Status   04/30/2019 10.42 3.90 - 12.70 K/uL Final  "  10/10/2013 8.85 3.90 - 12.70 K/uL Final   07/29/2010 10.94 (H) 4.8 - 10.8 K/uL Final       Hemoglobin   Date Value Ref Range Status   04/30/2019 15.5 14.0 - 18.0 g/dL Final   10/10/2013 15.7 14.0 - 18.0 g/dL Final   07/29/2010 15.6 14.0 - 18.0 gm/dl Final       Hematocrit   Date Value Ref Range Status   04/30/2019 45.5 40.0 - 54.0 % Final   10/10/2013 44.7 40.0 - 54.0 % Final   07/29/2010 44.8 40.0 - 54.0 % Final       MCV   Date Value Ref Range Status   04/30/2019 89 82 - 98 fL Final   10/10/2013 86 82 - 98 fL Final   07/29/2010 85.0 82 - 95 fL Final       Platelets   Date Value Ref Range Status   04/30/2019 360 (H) 150 - 350 K/uL Final   10/10/2013 283 150 - 350 K/uL Final   07/29/2010 282 150 - 350 K/uL Final       Lab Results   Component Value Date    CHOL 209 (H) 01/19/2024    CHOL 215 (H) 09/28/2022    CHOL 216 (H) 07/27/2021       Lab Results   Component Value Date    HDL 36 (L) 01/19/2024    HDL 37 (L) 09/28/2022    HDL 41 07/27/2021       Lab Results   Component Value Date    LDLCALC 149.2 01/19/2024    LDLCALC 143.4 09/28/2022    LDLCALC 140.4 07/27/2021       Lab Results   Component Value Date    TRIG 119 01/19/2024    TRIG 173 (H) 09/28/2022    TRIG 173 (H) 07/27/2021       Lab Results   Component Value Date    CHOLHDL 17.2 (L) 01/19/2024    CHOLHDL 17.2 (L) 09/28/2022    CHOLHDL 19.0 (L) 07/27/2021     No results found for: "RPR"  No results found for: "QUANTIFERON"    Medications:  Current Outpatient Medications on File Prior to Visit   Medication Sig Dispense Refill    amLODIPine (NORVASC) 10 MG tablet Take 1 tablet (10 mg total) by mouth once daily. 90 tablet 3    aspirin (ECOTRIN) 81 MG EC tablet Take 81 mg by mouth once daily.      clonazePAM (KLONOPIN) 0.5 MG tablet Take 1 tablet (0.5 mg total) by mouth 2 (two) times a day. 60 tablet 5    diclofenac (VOLTAREN) 75 MG EC tablet Take 1 tablet (75 mg total) by mouth 2 (two) times daily as needed (pain). 60 tablet 2    DULoxetine (CYMBALTA) 30 MG " "capsule Take 30 mg by mouth once daily. Plus 60 mg for total daily 90 mg      DULoxetine (CYMBALTA) 60 MG capsule Take 90 mg by mouth once daily.  1    gabapentin (NEURONTIN) 300 MG capsule Take one capsule PO at night for one week then increased to one capsule PO every 12 hours for one week then increased to one capsule PO every 8 hours and continue with three times a day 90 capsule 2    multivitamin capsule Take 1 capsule by mouth once daily.      mupirocin (BACTROBAN) 2 % ointment Apply to affected area 3 times daily 22 g 1    sulfamethoxazole-trimethoprim 800-160mg (BACTRIM DS) 800-160 mg Tab Take 1 tablet by mouth 2 (two) times daily. 20 tablet 0    tadalafiL (CIALIS) 20 MG Tab Take 1 tablet (20 mg total) by mouth daily as needed. 30 tablet 0    valsartan (DIOVAN) 320 MG tablet Take 1 tablet (320 mg total) by mouth once daily. 90 tablet 3    topiramate (TOPAMAX) 50 MG tablet Take 1 tablet (50 mg total) by mouth 2 (two) times daily. (Patient not taking: Reported on 2/28/2024) 180 tablet 0     No current facility-administered medications on file prior to visit.       Antibiotics:   Antibiotics (From admission, onward)      None            HIV: No components found for: "HIV 1/2 AG/AB"  Hepatitis C IgG: No components found for: "HEPATITIS C"  Syphilis: No results found for: "RPR"    Hepatitis A IgG: No components found for: "HEPATITIS A IGG"  Hepatitis Bc IgG: No components found for: "HEPATITIS B CORE IGG"  Hepatitis Bs IgG:  Quantiferon: No results found for: "QUANTIFERON"  VZV IgG: No components found for: "VARICELLA IGG"    No components found for: "SEDIMENTATION RATE"  No components found for: "C-REACTIVE PROTEIN"      Microbiology x 7d:   Microbiology Results (last 7 days)       ** No results found for the last 168 hours. **            Immunization History   Administered Date(s) Administered    COVID-19 MRNA, LN-S PF (MODERNA HALF 0.25 ML DOSE) 11/24/2021    COVID-19, MRNA, LN-S, PF (MODERNA FULL 0.5 ML DOSE) " 02/25/2021, 03/25/2021    Influenza 11/02/2021    Influenza - Quadrivalent - PF *Preferred* (6 months and older) 11/02/2021, 10/13/2022    Tdap 07/27/2021         Reviewed records today as well as relevant labs, cultures, and imaging    Assessment:     Mrsa SSTI    No toxicities from antimicrobials      Plan:     Skin lesion improving with bactrim. Change to doxycycline because it doesn't require lab monitoring    Counseled on avoid pre-disposing factors for folliculitis such as shaving with razors and to minimize exposure to hot tubs or heated swimming pools. Encouraged use of MRSA decolonization protocol for household:     Nasal decolonization with mupirocin ointment (2%) applied to nares twice daily for 5 to 10 days, and  Topical body decolonization (one of the following):  Chlorhexidine gluconate (2% or 4% solution): daily washes or use of a disposable impregnated cloth for 5 to 14 days, or  Dilute bleach baths (one teaspoon bleach per gallon of water, or one-fourth cup bleach per one-fourth tub [approximately 13 gallons of water] for 15 minutes twice weekly) for approximately three months      - Will monitor drug therapy for toxicity      - The following labs were ordered:    Orders Placed This Encounter   Procedures    BASIC METABOLIC PANEL     Standing Status:   Future     Number of Occurrences:   1     Standing Expiration Date:   5/28/2025         I have sent communication to the referring physician and/or primary care provider.     The total time for evaluation and management services performed on 2/28/24 was greater than 30 minutes.  This includes face to face time and non-face to face time preparing to see the patient (eg, review of tests), obtaining and/or reviewing separately obtained history, documenting clinical information in the electronic or other health record, independently interpreting results, and communicating results to the patient/family/caregiver, or care coordination.             Cherie  MD Pacheco, MPH  Infectious Disease

## 2024-02-28 NOTE — PATIENT INSTRUCTIONS
Encouraged use of MRSA decolonization protocol for household:     Nasal decolonization with mupirocin ointment (2%) applied to nares twice daily for 5 to 10 days, and  Topical body decolonization (one of the following):  Chlorhexidine gluconate (2% or 4% solution): daily washes or use of a disposable impregnated cloth for 5 to 14 days, or  Dilute bleach baths (one teaspoon bleach per gallon of water, or one-fourth cup bleach per one-fourth tub [approximately 13 gallons of water] for 15 minutes twice weekly) for approximately three months

## 2024-03-01 ENCOUNTER — TELEPHONE (OUTPATIENT)
Dept: INFECTIOUS DISEASES | Facility: CLINIC | Age: 49
End: 2024-03-01
Payer: COMMERCIAL

## 2024-03-01 ENCOUNTER — PATIENT MESSAGE (OUTPATIENT)
Dept: INFECTIOUS DISEASES | Facility: CLINIC | Age: 49
End: 2024-03-01
Payer: COMMERCIAL

## 2024-03-01 ENCOUNTER — PATIENT MESSAGE (OUTPATIENT)
Dept: INTERNAL MEDICINE | Facility: CLINIC | Age: 49
End: 2024-03-01
Payer: COMMERCIAL

## 2024-03-01 DIAGNOSIS — L08.9 SKIN INFECTION: Primary | ICD-10-CM

## 2024-03-01 RX ORDER — SULFAMETHOXAZOLE AND TRIMETHOPRIM 800; 160 MG/1; MG/1
2 TABLET ORAL 2 TIMES DAILY
Qty: 56 TABLET | Refills: 0 | Status: SHIPPED | OUTPATIENT
Start: 2024-03-01 | End: 2024-03-15

## 2024-03-01 RX ORDER — SULFAMETHOXAZOLE AND TRIMETHOPRIM 800; 160 MG/1; MG/1
2 TABLET ORAL 2 TIMES DAILY
Qty: 56 TABLET | Refills: 0 | Status: SHIPPED | OUTPATIENT
Start: 2024-03-01 | End: 2024-03-01 | Stop reason: SDUPTHER

## 2024-03-01 NOTE — TELEPHONE ENCOUNTER
----- Message from Gabby Adkins sent at 3/1/2024 11:11 AM CST -----  Regarding: Symptoms Advise  Contact: [phone]  DIANA DRUMMOND calling regarding Patient Advice (message) for # pt is calling to speak to someone about in the office about his lips he has some concerns and questions.

## 2024-03-01 NOTE — TELEPHONE ENCOUNTER
Called patient back, no answer. Will send portal message    ----- Message from Yaneli Arreola LPN sent at 3/1/2024  8:45 AM CST -----  Contact: @ [phone]  Doesn't itch just dry and chapped and tender  ----- Message -----  From: Criselda Miranda  Sent: 3/1/2024   8:18 AM CST  To: [facility] Staff    Pt called in regards to  doxycycline (VIBRAMYCIN) 100 MG Cap since he started taking it his bottom lip has been swollen and chapped need to know what to do .....Please call and adv @ [phone]

## 2024-03-01 NOTE — TELEPHONE ENCOUNTER
----- Message from Carrol Thompson sent at 3/1/2024 10:47 AM CST -----  DIANA DRUMMOND calling regarding Refills  (message) Pharmacy calling asking for a call back to clarification the direction for #sulfamethoxazole-trimethoprim 800-160mg (BACTRIM DS) 800-160 mg Tab      [facility] ([place]) - [place] - [address]  [address] [place] [address]  Phone: [phone] Fax: [phone]

## 2024-03-04 ENCOUNTER — ON-DEMAND VIRTUAL (OUTPATIENT)
Dept: URGENT CARE | Facility: CLINIC | Age: 49
End: 2024-03-04
Payer: COMMERCIAL

## 2024-03-04 DIAGNOSIS — R21 RASH: Primary | ICD-10-CM

## 2024-03-04 PROCEDURE — 99213 OFFICE O/P EST LOW 20 MIN: CPT | Mod: 95,,, | Performed by: FAMILY MEDICINE

## 2024-03-05 ENCOUNTER — PATIENT MESSAGE (OUTPATIENT)
Dept: NEUROSURGERY | Facility: CLINIC | Age: 49
End: 2024-03-05
Payer: COMMERCIAL

## 2024-03-05 RX ORDER — MELOXICAM 15 MG/1
15 TABLET ORAL DAILY PRN
Qty: 90 TABLET | Refills: 0 | Status: SHIPPED | OUTPATIENT
Start: 2024-03-05

## 2024-03-05 NOTE — PROGRESS NOTES
Subjective:      Patient ID: Sarkis Saez Jr. is a 49 y.o. male.    Vitals:  vitals were not taken for this visit.     Chief Complaint: No chief complaint on file.      Visit Type: TELE AUDIOVISUAL    Present with the patient at the time of consultation: TELEMED PRESENT WITH PATIENT: None    Past Medical History:   Diagnosis Date    Anxiety     Hypertension     Obesity      No past surgical history on file.  Review of patient's allergies indicates:  No Known Allergies  Current Outpatient Medications on File Prior to Visit   Medication Sig Dispense Refill    amLODIPine (NORVASC) 10 MG tablet Take 1 tablet (10 mg total) by mouth once daily. 90 tablet 3    aspirin (ECOTRIN) 81 MG EC tablet Take 81 mg by mouth once daily.      chlorhexidine (HIBICLENS) 4 % external liquid Apply topically daily as needed (staph decolonization). 946 mL 11    clonazePAM (KLONOPIN) 0.5 MG tablet Take 1 tablet (0.5 mg total) by mouth 2 (two) times a day. 60 tablet 5    diclofenac (VOLTAREN) 75 MG EC tablet Take 1 tablet (75 mg total) by mouth 2 (two) times daily as needed (pain). 60 tablet 2    DULoxetine (CYMBALTA) 30 MG capsule Take 30 mg by mouth once daily. Plus 60 mg for total daily 90 mg      DULoxetine (CYMBALTA) 60 MG capsule Take 90 mg by mouth once daily.  1    gabapentin (NEURONTIN) 300 MG capsule Take one capsule PO at night for one week then increased to one capsule PO every 12 hours for one week then increased to one capsule PO every 8 hours and continue with three times a day 90 capsule 2    multivitamin capsule Take 1 capsule by mouth once daily.      mupirocin (BACTROBAN) 2 % ointment by Nasal route once daily. Apply to affected area 3 times daily 22 g 11    sulfamethoxazole-trimethoprim 800-160mg (BACTRIM DS) 800-160 mg Tab Take 2 tablets by mouth 2 (two) times daily. for 14 days 56 tablet 0    tadalafiL (CIALIS) 20 MG Tab Take 1 tablet (20 mg total) by mouth daily as needed. 30 tablet 0    topiramate (TOPAMAX) 50 MG  tablet Take 1 tablet (50 mg total) by mouth 2 (two) times daily. (Patient not taking: Reported on 2/28/2024) 180 tablet 0    valsartan (DIOVAN) 320 MG tablet Take 1 tablet (320 mg total) by mouth once daily. 90 tablet 3     No current facility-administered medications on file prior to visit.     Family History   Problem Relation Age of Onset    Diabetes Father     Hypertension Father     Heart disease Maternal Grandfather     Heart disease Paternal Grandfather            Ohs Peq Odvv Intake    3/4/2024 10:47 PM CST - Filed by Patient   What is your current physical address in the event of a medical emergency? [address] [place] [address]   Are you able to take your vital signs? No   Please attach any relevant images or files          Had an abscess drained mid February    Taking Bactrim    Had f/u noted from culture (+) MRSA      Based on cultured it was advised to change antibiotics    Started Doxycycline  Bottom lips swelling  Hives on chest (since this evening worsened)  Suggested to d/c the antibiotics and resume Bactrim (which he started today)    Concern about taking antihistamine   Along with rash spreading            Constitution: Negative for fever.   Respiratory:  Negative for chest tightness and shortness of breath.    Skin:  Positive for rash and hives.   Allergic/Immunologic: Positive for hives.        Objective:   The physical exam was conducted virtually.  Physical Exam   HENT:   Head: Normocephalic.   Mouth/Throat: Mucous membranes are moist. Posterior oropharyngeal erythema present.   Eyes: Pupils are equal, round, and reactive to light. Extraocular movement intact   Abdominal: Normal appearance.   Neurological: He is alert.   Skin: Skin is warm and rash. lesion         Comments: Lower lip-small lesions  (+) hives chest       Assessment:     1. Rash        Plan:     ? Allergic vs EM  Patient has been off Doxycycline for the past weekend  Encouraged for him to use Antihistamine (as long as  there is no decongestant)  Patient to contin on Bactrim    In person warranted: lip/tongue swelling, trouble breathing

## 2024-03-06 ENCOUNTER — OFFICE VISIT (OUTPATIENT)
Dept: INFECTIOUS DISEASES | Facility: CLINIC | Age: 49
End: 2024-03-06
Payer: COMMERCIAL

## 2024-03-06 VITALS
WEIGHT: 315 LBS | DIASTOLIC BLOOD PRESSURE: 76 MMHG | HEIGHT: 71 IN | HEART RATE: 78 BPM | TEMPERATURE: 99 F | SYSTOLIC BLOOD PRESSURE: 136 MMHG | BODY MASS INDEX: 44.1 KG/M2

## 2024-03-06 DIAGNOSIS — L08.9 SKIN INFECTION: Primary | ICD-10-CM

## 2024-03-06 DIAGNOSIS — L50.9 HIVES: ICD-10-CM

## 2024-03-06 PROCEDURE — 3044F HG A1C LEVEL LT 7.0%: CPT | Mod: CPTII,S$GLB,, | Performed by: INTERNAL MEDICINE

## 2024-03-06 PROCEDURE — 3075F SYST BP GE 130 - 139MM HG: CPT | Mod: CPTII,S$GLB,, | Performed by: INTERNAL MEDICINE

## 2024-03-06 PROCEDURE — 4010F ACE/ARB THERAPY RXD/TAKEN: CPT | Mod: CPTII,S$GLB,, | Performed by: INTERNAL MEDICINE

## 2024-03-06 PROCEDURE — 99212 OFFICE O/P EST SF 10 MIN: CPT | Mod: S$GLB,,, | Performed by: INTERNAL MEDICINE

## 2024-03-06 PROCEDURE — 99999 PR PBB SHADOW E&M-EST. PATIENT-LVL III: CPT | Mod: PBBFAC,,, | Performed by: INTERNAL MEDICINE

## 2024-03-06 PROCEDURE — 3078F DIAST BP <80 MM HG: CPT | Mod: CPTII,S$GLB,, | Performed by: INTERNAL MEDICINE

## 2024-03-06 PROCEDURE — 3008F BODY MASS INDEX DOCD: CPT | Mod: CPTII,S$GLB,, | Performed by: INTERNAL MEDICINE

## 2024-03-06 PROCEDURE — 1159F MED LIST DOCD IN RCRD: CPT | Mod: CPTII,S$GLB,, | Performed by: INTERNAL MEDICINE

## 2024-03-06 NOTE — PROGRESS NOTES
INFECTIOUS DISEASE CLINIC  3/6/24     Subjective:      Chief Complaint:   Chief Complaint   Patient presents with    Follow-up       History of Present Illness:    Patient Sarkis Saez Jr. is a 49 y.o. male who presents today for rash.     Recently started doxy and hibiclens. After that started noticing little bumps. Took x 2 days. Thursday, hives on abdomen. Another dose doxy fri, worse. Rash comes and goes, so stopped. Resumed bactrim . New detergent a few weeks ago. Denies sob or mouth or tongue swelling. Rash not progressing, but not going away      Review of Symptoms:  Constitutional: Denies fevers, chills, or weakness.  ENT: Denies dysphagia, nasal discharge, ear pain or discharge.  Cardiovascular: Denies chest pain, palpitations, orthopnea, or claudication.  Respiratory: Denies shortness of breath, cough, hemoptysis, or wheezing.  GI: Denies nausea/vomitting, hematochezia, melena, abd pain, or changes in appetite.  : Denies dysuria, incontinence, or hematuria.  Musculoskeletal: Denies joint pain or myalgias.  Skin/breast: Denies rashes, lumps, lesions, or discharge.  Neurologic: Denies headache, dizziness, vertigo, or paresthesias.    Past Medical History:   Diagnosis Date    Anxiety     Hypertension     Obesity        No past surgical history on file.    Family History   Problem Relation Age of Onset    Diabetes Father     Hypertension Father     Heart disease Maternal Grandfather     Heart disease Paternal Grandfather        Social History     Socioeconomic History    Marital status: Legally    Tobacco Use    Smoking status: Former     Types: Cigars     Quit date: 2014     Years since quittin.2    Smokeless tobacco: Never   Substance and Sexual Activity    Alcohol use: Yes   Social History Narrative    Pt is  at [employer].      Social Determinants of Health     Financial Resource Strain: Medium Risk (2024)    Overall Financial Resource Strain (CARDIA)      Difficulty of Paying Living Expenses: Somewhat hard   Food Insecurity: No Food Insecurity (1/31/2024)    Hunger Vital Sign     Worried About Running Out of Food in the Last Year: Never true     Ran Out of Food in the Last Year: Never true   Transportation Needs: No Transportation Needs (1/31/2024)    PRAPARE - Transportation     Lack of Transportation (Medical): No     Lack of Transportation (Non-Medical): No   Physical Activity: Insufficiently Active (1/31/2024)    Exercise Vital Sign     Days of Exercise per Week: 2 days     Minutes of Exercise per Session: 20 min   Stress: No Stress Concern Present (1/31/2024)    [employer] - Occupational Stress Questionnaire     Feeling of Stress : Only a little   Social Connections: Unknown (1/31/2024)    Social Connection and Isolation Panel [NHANES]     Frequency of Communication with Friends and Family: More than three times a week     Frequency of Social Gatherings with Friends and Family: More than three times a week     Active Member of Clubs or Organizations: No     Attends Club or Organization Meetings: Patient declined     Marital Status:    Housing Stability: Low Risk  (1/31/2024)    Housing Stability Vital Sign     Unable to Pay for Housing in the Last Year: No     Number of Places Lived in the Last Year: 1     Unstable Housing in the Last Year: No       Review of patient's allergies indicates:  No Known Allergies      Objective:   VS (24h):   Vitals:    03/06/24 1312   BP: 136/76   Pulse: 78   Temp: 99.2 °F (37.3 °C)     [unfilled]  General: Afebrile, alert, comfortable, no acute distress.   HEENT: DEEPA. EOMI, no scleral icterus.   Pulmonary: Non labored,clear to auscultation A/P/L. No wheezing, crackles, or rhonchi.  Cardiac: normal S1 & S2 w/o rubs/murmurs/gallops.  Abdominal: Non-tender, non-distended  Extremities: Moves all extremities x 4. Boil on buttox gone. No drainage or erythema or warmth  Skin: rash on chest,  erythematous and diffuse  Neurological:  Alert and oriented x 4.     Labs:    Glucose   Date Value Ref Range Status   02/28/2024 115 (H) 70 - 110 mg/dL Final   01/19/2024 127 (H) 70 - 110 mg/dL Final   09/28/2022 93 70 - 110 mg/dL Final       Calcium   Date Value Ref Range Status   02/28/2024 9.8 8.7 - 10.5 mg/dL Final   01/19/2024 10.3 8.7 - 10.5 mg/dL Final   09/28/2022 10.0 8.7 - 10.5 mg/dL Final       Albumin   Date Value Ref Range Status   01/19/2024 4.1 3.5 - 5.2 g/dL Final   09/28/2022 4.4 3.5 - 5.2 g/dL Final   07/27/2021 4.2 3.5 - 5.2 g/dL Final       Total Protein   Date Value Ref Range Status   01/19/2024 7.6 6.0 - 8.4 g/dL Final   09/28/2022 7.4 6.0 - 8.4 g/dL Final   07/27/2021 7.7 6.0 - 8.4 g/dL Final       Sodium   Date Value Ref Range Status   02/28/2024 140 136 - 145 mmol/L Final   01/19/2024 139 136 - 145 mmol/L Final   09/28/2022 137 136 - 145 mmol/L Final       Potassium   Date Value Ref Range Status   02/28/2024 4.6 3.5 - 5.1 mmol/L Final   01/19/2024 4.8 3.5 - 5.1 mmol/L Final   09/28/2022 4.0 3.5 - 5.1 mmol/L Final       CO2   Date Value Ref Range Status   02/28/2024 26 23 - 29 mmol/L Final   01/19/2024 25 23 - 29 mmol/L Final   09/28/2022 24 23 - 29 mmol/L Final       Chloride   Date Value Ref Range Status   02/28/2024 105 95 - 110 mmol/L Final   01/19/2024 104 95 - 110 mmol/L Final   09/28/2022 102 95 - 110 mmol/L Final       BUN   Date Value Ref Range Status   02/28/2024 13 6 - 20 mg/dL Final   01/19/2024 13 6 - 20 mg/dL Final   09/28/2022 9 6 - 20 mg/dL Final       Creatinine   Date Value Ref Range Status   02/28/2024 0.9 0.5 - 1.4 mg/dL Final   01/19/2024 0.9 0.5 - 1.4 mg/dL Final   09/28/2022 0.8 0.5 - 1.4 mg/dL Final       Alkaline Phosphatase   Date Value Ref Range Status   01/19/2024 24 (L) 55 - 135 U/L Final   09/28/2022 22 (L) 55 - 135 U/L Final   07/27/2021 23 (L) 55 - 135 U/L Final       ALT   Date Value Ref Range Status   01/19/2024 35 10 - 44 U/L Final   09/28/2022 52 (H) 10 -  44 U/L Final   07/27/2021 31 10 - 44 U/L Final       AST   Date Value Ref Range Status   01/19/2024 17 10 - 40 U/L Final   09/28/2022 23 10 - 40 U/L Final   07/27/2021 18 10 - 40 U/L Final       Total Bilirubin   Date Value Ref Range Status   01/19/2024 0.5 0.1 - 1.0 mg/dL Final     Comment:     For infants and newborns, interpretation of results should be based  on gestational age, weight and in agreement with clinical  observations.    Premature Infant recommended reference ranges:  Up to 24 hours.............<8.0 mg/dL  Up to 48 hours............<12.0 mg/dL  3-5 days..................<15.0 mg/dL  6-29 days.................<15.0 mg/dL     09/28/2022 0.6 0.1 - 1.0 mg/dL Final     Comment:     For infants and newborns, interpretation of results should be based  on gestational age, weight and in agreement with clinical  observations.    Premature Infant recommended reference ranges:  Up to 24 hours.............<8.0 mg/dL  Up to 48 hours............<12.0 mg/dL  3-5 days..................<15.0 mg/dL  6-29 days.................<15.0 mg/dL     07/27/2021 0.8 0.1 - 1.0 mg/dL Final     Comment:     For infants and newborns, interpretation of results should be based  on gestational age, weight and in agreement with clinical  observations.    Premature Infant recommended reference ranges:  Up to 24 hours.............<8.0 mg/dL  Up to 48 hours............<12.0 mg/dL  3-5 days..................<15.0 mg/dL  6-29 days.................<15.0 mg/dL         WBC   Date Value Ref Range Status   04/30/2019 10.42 3.90 - 12.70 K/uL Final   10/10/2013 8.85 3.90 - 12.70 K/uL Final   07/29/2010 10.94 (H) 4.8 - 10.8 K/uL Final       Hemoglobin   Date Value Ref Range Status   04/30/2019 15.5 14.0 - 18.0 g/dL Final   10/10/2013 15.7 14.0 - 18.0 g/dL Final   07/29/2010 15.6 14.0 - 18.0 gm/dl Final       Hematocrit   Date Value Ref Range Status   04/30/2019 45.5 40.0 - 54.0 % Final   10/10/2013 44.7 40.0 - 54.0 % Final   07/29/2010 44.8 40.0 -  "54.0 % Final       MCV   Date Value Ref Range Status   04/30/2019 89 82 - 98 fL Final   10/10/2013 86 82 - 98 fL Final   07/29/2010 85.0 82 - 95 fL Final       Platelets   Date Value Ref Range Status   04/30/2019 360 (H) 150 - 350 K/uL Final   10/10/2013 283 150 - 350 K/uL Final   07/29/2010 282 150 - 350 K/uL Final       Lab Results   Component Value Date    CHOL 209 (H) 01/19/2024    CHOL 215 (H) 09/28/2022    CHOL 216 (H) 07/27/2021       Lab Results   Component Value Date    HDL 36 (L) 01/19/2024    HDL 37 (L) 09/28/2022    HDL 41 07/27/2021       Lab Results   Component Value Date    LDLCALC 149.2 01/19/2024    LDLCALC 143.4 09/28/2022    LDLCALC 140.4 07/27/2021       Lab Results   Component Value Date    TRIG 119 01/19/2024    TRIG 173 (H) 09/28/2022    TRIG 173 (H) 07/27/2021       Lab Results   Component Value Date    CHOLHDL 17.2 (L) 01/19/2024    CHOLHDL 17.2 (L) 09/28/2022    CHOLHDL 19.0 (L) 07/27/2021     No results found for: "RPR"  No results found for: "QUANTIFERON"    Medications:  Current Outpatient Medications on File Prior to Visit   Medication Sig Dispense Refill    amLODIPine (NORVASC) 10 MG tablet Take 1 tablet (10 mg total) by mouth once daily. 90 tablet 3    aspirin (ECOTRIN) 81 MG EC tablet Take 81 mg by mouth once daily.      chlorhexidine (HIBICLENS) 4 % external liquid Apply topically daily as needed (staph decolonization). 946 mL 11    clonazePAM (KLONOPIN) 0.5 MG tablet Take 1 tablet (0.5 mg total) by mouth 2 (two) times a day. 60 tablet 5    diclofenac (VOLTAREN) 75 MG EC tablet Take 1 tablet (75 mg total) by mouth 2 (two) times daily as needed (pain). 60 tablet 2    DULoxetine (CYMBALTA) 30 MG capsule Take 30 mg by mouth once daily. Plus 60 mg for total daily 90 mg      DULoxetine (CYMBALTA) 60 MG capsule Take 90 mg by mouth once daily.  1    gabapentin (NEURONTIN) 300 MG capsule Take one capsule PO at night for one week then increased to one capsule PO every 12 hours for one week " "then increased to one capsule PO every 8 hours and continue with three times a day 90 capsule 2    meloxicam (MOBIC) 15 MG tablet Take 1 tablet (15 mg total) by mouth daily as needed for Pain. 90 tablet 0    multivitamin capsule Take 1 capsule by mouth once daily.      mupirocin (BACTROBAN) 2 % ointment by Nasal route once daily. Apply to affected area 3 times daily 22 g 11    sulfamethoxazole-trimethoprim 800-160mg (BACTRIM DS) 800-160 mg Tab Take 2 tablets by mouth 2 (two) times daily. for 14 days 56 tablet 0    tadalafiL (CIALIS) 20 MG Tab Take 1 tablet (20 mg total) by mouth daily as needed. 30 tablet 0    valsartan (DIOVAN) 320 MG tablet Take 1 tablet (320 mg total) by mouth once daily. 90 tablet 3     No current facility-administered medications on file prior to visit.       Antibiotics:   Antibiotics (From admission, onward)      None            HIV: No components found for: "HIV 1/2 AG/AB"  Hepatitis C IgG: No components found for: "HEPATITIS C"  Syphilis: No results found for: "RPR"    Hepatitis A IgG: No components found for: "HEPATITIS A IGG"  Hepatitis Bc IgG: No components found for: "HEPATITIS B CORE IGG"  Hepatitis Bs IgG:  Quantiferon: No results found for: "QUANTIFERON"  VZV IgG: No components found for: "VARICELLA IGG"    No components found for: "SEDIMENTATION RATE"  No components found for: "C-REACTIVE PROTEIN"      Microbiology x 7d:   Microbiology Results (last 7 days)       ** No results found for the last 168 hours. **            Immunization History   Administered Date(s) Administered    COVID-19 MRNA, LN-S PF (MODERNA HALF 0.25 ML DOSE) 11/24/2021    COVID-19, MRNA, LN-S, PF (MODERNA FULL 0.5 ML DOSE) 02/25/2021, 03/25/2021    Influenza 11/02/2021    Influenza - Quadrivalent - PF *Preferred* (6 months and older) 11/02/2021, 10/13/2022    Tdap 07/27/2021         Reviewed records today as well as relevant labs, cultures, and imaging    Assessment:       Hives- given timeline and location of " rash, suspect doxycycline culprit  Mrsa SSTI improved    Plan:     Stop antibiotics. Update allergy to doxycycline.     I have sent communication to the referring physician and/or primary care provider.     The total time for evaluation and management services performed on 3/6/24 was greater than 20 minutes.  This includes face to face time and non-face to face time preparing to see the patient (eg, review of tests), obtaining and/or reviewing separately obtained history, documenting clinical information in the electronic or other health record, independently interpreting results, and communicating results to the patient/family/caregiver, or care coordination.             Cherie Bergman MD, MPH  Infectious Disease

## 2024-03-13 ENCOUNTER — PATIENT MESSAGE (OUTPATIENT)
Dept: NEUROSURGERY | Facility: CLINIC | Age: 49
End: 2024-03-13
Payer: COMMERCIAL

## 2024-03-14 ENCOUNTER — TELEPHONE (OUTPATIENT)
Dept: NEUROSURGERY | Facility: CLINIC | Age: 49
End: 2024-03-14
Payer: COMMERCIAL

## 2024-03-14 RX ORDER — METHYLPREDNISOLONE 4 MG/1
TABLET ORAL
Qty: 1 EACH | Refills: 0 | Status: SHIPPED | OUTPATIENT
Start: 2024-03-14

## 2024-03-14 NOTE — TELEPHONE ENCOUNTER
LM for patient stating Yamilet Abdi PA prescribed Medrol dose pack for him and Yamilet suggests getting MRI done. If he wants to get it done within [facility] system I asked for him to call back so we could get it scheduled.     AS

## 2024-03-14 NOTE — TELEPHONE ENCOUNTER
Medrol dosepack prescribed.    Figure out where he wants MRI done and let me know.    Yamilet Abdi, [facility], PA-C  Neurosurgery  [facility]  03/14/2024

## 2024-03-15 NOTE — TELEPHONE ENCOUNTER
MRI ordered.    Please schedule and schedule fu in clinic after.    JABARI Wiggins, PA-C  Neurosurgery  [facility]  03/15/2024

## 2024-03-18 ENCOUNTER — PATIENT MESSAGE (OUTPATIENT)
Dept: NEUROSURGERY | Facility: CLINIC | Age: 49
End: 2024-03-18
Payer: COMMERCIAL

## 2024-03-24 ENCOUNTER — OFFICE VISIT (OUTPATIENT)
Dept: URGENT CARE | Facility: CLINIC | Age: 49
End: 2024-03-24
Payer: COMMERCIAL

## 2024-03-24 VITALS
HEART RATE: 125 BPM | SYSTOLIC BLOOD PRESSURE: 137 MMHG | OXYGEN SATURATION: 95 % | BODY MASS INDEX: 44.1 KG/M2 | HEIGHT: 71 IN | DIASTOLIC BLOOD PRESSURE: 68 MMHG | TEMPERATURE: 103 F | WEIGHT: 315 LBS | RESPIRATION RATE: 16 BRPM

## 2024-03-24 DIAGNOSIS — J02.0 STREP PHARYNGITIS: Primary | ICD-10-CM

## 2024-03-24 DIAGNOSIS — R50.9 FEVER, UNSPECIFIED FEVER CAUSE: ICD-10-CM

## 2024-03-24 DIAGNOSIS — Z11.59 ENCOUNTER FOR SCREENING FOR OTHER VIRAL DISEASES: ICD-10-CM

## 2024-03-24 LAB
CTP QC/QA: YES
MOLECULAR STREP A: POSITIVE
POC MOLECULAR INFLUENZA A AGN: NEGATIVE
POC MOLECULAR INFLUENZA B AGN: NEGATIVE
SARS-COV-2 AG RESP QL IA.RAPID: NEGATIVE

## 2024-03-24 PROCEDURE — 87502 INFLUENZA DNA AMP PROBE: CPT | Mod: QW,S$GLB,, | Performed by: PHYSICIAN ASSISTANT

## 2024-03-24 PROCEDURE — 87811 SARS-COV-2 COVID19 W/OPTIC: CPT | Mod: QW,S$GLB,, | Performed by: PHYSICIAN ASSISTANT

## 2024-03-24 PROCEDURE — 87651 STREP A DNA AMP PROBE: CPT | Mod: QW,S$GLB,, | Performed by: PHYSICIAN ASSISTANT

## 2024-03-24 PROCEDURE — 99214 OFFICE O/P EST MOD 30 MIN: CPT | Mod: S$GLB,,, | Performed by: PHYSICIAN ASSISTANT

## 2024-03-24 RX ORDER — IBUPROFEN 200 MG
600 TABLET ORAL
Status: COMPLETED | OUTPATIENT
Start: 2024-03-24 | End: 2024-03-24

## 2024-03-24 RX ORDER — ACETAMINOPHEN 500 MG
1000 TABLET ORAL
Status: COMPLETED | OUTPATIENT
Start: 2024-03-24 | End: 2024-03-24

## 2024-03-24 RX ORDER — AMOXICILLIN 500 MG/1
500 CAPSULE ORAL EVERY 12 HOURS
Qty: 20 CAPSULE | Refills: 0 | Status: SHIPPED | OUTPATIENT
Start: 2024-03-24 | End: 2024-04-03

## 2024-03-24 RX ADMIN — Medication 600 MG: at 11:03

## 2024-03-24 RX ADMIN — Medication 1000 MG: at 11:03

## 2024-03-24 NOTE — PATIENT INSTRUCTIONS
PLEASE READ YOUR DISCHARGE INSTRUCTIONS ENTIRELY AS IT CONTAINS IMPORTANT INFORMATION.    Patient had covid testing done today.    Discussed corona virus precautions and reviewed [facility] FAC; printed a copy for patient.  I discussed to continue to monitor their symptoms. Discussed that if their symptoms persist or worsen to seek re-evaluation. Clinic vs. ER precautions were given.  Patient verbalized understanding and agreed with the entire plan of care.    If Negative and no direct exposure: symptom free without fever reducing meds in 24 hours - can go back to work in 24 hours.    - Reviewed radiographs and all diagnostic testing with patient/family.    - Rest.  Drink plenty of fluids.    - Tylenol OR anti-inflammatory (NSAIDs, ibuprofen, aleve, motrin) as directed as needed for fever/pain.  For Tylenol, do not exceed 3000 mg/ day. If no contraindication or allergies.  -OK to supplement with OTC DayQuil, NyQuil or TheraFlu every 6 hours as needed for cough and congestion.  Use caution of total amount of Tylenol/acetaminophen per day.  - If you were prescribed antibiotics, please take them to completion. Please supplement with OTC probiotics and yogurt.  Contact clinic if develop profuse diarrhea and weakness.      -Below are suggestions for symptomatic relief:              -Salt water gargles to soothe throat pain.              -Chloroseptic spray also helps to numb throat pain. Drink hot tea with honey or lemon to soothe your throat.              -Nasal saline spray reduces inflammation and dryness.              -Warm face compresses to help with facial sinus pain/pressure.              -Vicks vapor rub at night.             **may also supplement with OTC nasal spray to help with inflammation and congestion.   Wean to off when you nose becomes to dry or bleed. Also use nasal saline twice a day to help with dryness.               -Flonase OTC or Nasacort OTC  once or twice a day for nasal/sinus congestion. DON'T USE  IF YOU HAVE GLAUCOMA. CHECK WITH YOUR PHARMACIST/EYE PHYSICIAN.              -Simple foods like chicken noodle soup.              -Mucinex DM (ANY COUGH EXPECTORANT-- guaifenesin) for cough or chest congestion with mucus and (ANY COUGH SUPPRESSANT- dextromethorphan) helps with coughing every 12 hours. Mucinex-DM if you have chest congestion or sputum (caution if history of high blood pressure or palpitations).              -Zyrtec/Claritin/xyzal during the day time  & Benadryl at night (only if severe runny nose) may help with allergies and runny nose. Add decongestant if you have nasal/sinus congestion/sinus pressure/ear fullness sensation. (see below)              -may take OTC meclizine as needed for dizziness or nausea.         -You must understand that you've received an Urgent Care treatment only and that you may be released before all your medical problems are known or treated. You, the patient, will arrange for follow up care as instructed. Please arrange follow up with your primary medical clinic within 2-5 days if your signs and symptoms have not resolved or worsen.     - Follow up with your PCP or specialty clinic as directed.  You can call [phone] or [phone] to schedule an appointment with the appropriate provider.  Schedule CENTER is open Mon-Friday 8-5pm (excluded holidays).    - If your condition worsens or fails to improve we recommend that you receive another evaluation at the emergency room immediately or contact your primary medical clinic to discuss your concerns.

## 2024-03-24 NOTE — PROGRESS NOTES
"Subjective:      Patient ID: Sarkis Saez Jr. is a 49 y.o. male.    Vitals:  height is 5' 11" (1.803 m) and weight is 160.1 kg (353 lb) (abnormal). His oral temperature is 102.6 °F (39.2 °C) (abnormal). His blood pressure is 137/68 and his pulse is 125 (abnormal). His respiration is 16 and oxygen saturation is 95%.     Chief Complaint: Fever    49-year-old male who presents to urgent care clinic for evaluation.  Patient reports fever , nasal congestion, headache, body aches, and sore throat started this morning. Patient took nothing for his symptoms.  No other associated symptoms.  He has a .  Found to have fever 102.6 in clinic.  Sore throat is rated as 5/10.    Fever   This is a new problem. The current episode started today. The problem occurs constantly. The problem has been unchanged. The maximum temperature noted was 101 to 101.9 F. The temperature was taken using an oral thermometer. Associated symptoms include congestion, headaches, muscle aches and a sore throat. Pertinent negatives include no abdominal pain, chest pain, coughing, diarrhea, ear pain, nausea, rash, urinary pain, vomiting or wheezing. Associated symptoms comments: Fatigue. He has tried nothing for the symptoms.   Risk factors: no recent travel and no sick contacts        Constitution: Positive for chills, sweating, fatigue and fever. Negative for activity change and generalized weakness.   HENT:  Positive for congestion, sinus pressure and sore throat. Negative for ear pain, hearing loss, facial swelling, postnasal drip, sinus pain, trouble swallowing and voice change.    Neck: Negative for neck pain, neck stiffness and painful lymph nodes.   Cardiovascular:  Negative for chest pain, leg swelling, palpitations, sob on exertion and passing out.   Eyes:  Negative for eye discharge, eye pain, eye redness, photophobia, vision loss, double vision, blurred vision and eyelid swelling.   Respiratory:  Negative for chest tightness, " cough, sputum production, COPD, shortness of breath, wheezing and asthma.    Gastrointestinal:  Negative for abdominal pain, nausea, vomiting, diarrhea, bright red blood in stool, dark colored stools, rectal bleeding, heartburn and bowel incontinence.   Genitourinary:  Negative for dysuria, frequency, urgency, urine decreased, flank pain, bladder incontinence, hematuria and history of kidney stones.   Musculoskeletal:  Positive for muscle ache. Negative for trauma, joint pain, joint swelling, abnormal ROM of joint and muscle cramps.   Skin:  Negative for color change, pale, rash and wound.   Allergic/Immunologic: Negative for seasonal allergies, asthma and immunocompromised state.   Neurological:  Positive for headaches. Negative for dizziness, history of vertigo, light-headedness, passing out, facial drooping, speech difficulty, coordination disturbances, loss of balance, disorientation, altered mental status, loss of consciousness, numbness, tingling and seizures.   Hematologic/Lymphatic: Negative for swollen lymph nodes, easy bruising/bleeding and trouble clotting. Does not bruise/bleed easily.   Psychiatric/Behavioral:  Negative for altered mental status and disorientation.       Objective:     Physical Exam   Constitutional: He is oriented to person, place, and time. He appears well-developed. He is cooperative. He does not appear ill. No distress. obesity  HENT:   Head: Normocephalic.   Ears:   Right Ear: Hearing, external ear and ear canal normal. No no drainage, swelling or tenderness. No mastoid tenderness.   Left Ear: Hearing, external ear and ear canal normal. No no drainage, swelling or tenderness. No mastoid tenderness.   Nose: Nose normal. No rhinorrhea or congestion. Right sinus exhibits no maxillary sinus tenderness and no frontal sinus tenderness. Left sinus exhibits no maxillary sinus tenderness and no frontal sinus tenderness.   Mouth/Throat: Uvula is midline and mucous membranes are normal.  Mucous membranes are moist. No oral lesions. No trismus in the jaw. No uvula swelling. Posterior oropharyngeal edema and posterior oropharyngeal erythema present. No oropharyngeal exudate or tonsillar abscesses. Tonsils are 2+ on the right. Tonsils are 2+ on the left. No tonsillar exudate. Oropharynx is clear.   Eyes: Conjunctivae, EOM and lids are normal. Right eye exhibits no discharge. Left eye exhibits no discharge. Right conjunctiva is not injected. Right conjunctiva has no hemorrhage. Left conjunctiva is not injected. Left conjunctiva has no hemorrhage. Extraocular movement intact vision grossly intact gaze aligned appropriately   Neck: Phonation normal. Neck supple. No neck rigidity present.   Cardiovascular: Normal rate, regular rhythm, normal heart sounds and normal pulses.   No murmur heard.  Pulmonary/Chest: Effort normal and breath sounds normal. No accessory muscle usage. No respiratory distress. He has no wheezes. He exhibits no tenderness.   Abdominal: Normal appearance. He exhibits no distension. Soft. There is no abdominal tenderness. There is no rebound and no guarding.   Musculoskeletal: Normal range of motion.         General: Normal range of motion.      Comments: Moves all extremities with normal tone, strength, and ROM.  Gait normal.   Lymphadenopathy:     He has cervical adenopathy.        Right cervical: No superficial cervical adenopathy present.       Left cervical: No superficial cervical adenopathy present.   Neurological: no focal deficit. He is alert, oriented to person, place, and time and at baseline. He has normal motor skills and normal sensation. He displays facial symmetry and no dysarthria. He exhibits normal muscle tone. Gait and coordination normal. Coordination normal. GCS eye subscore is 4. GCS verbal subscore is 5. GCS motor subscore is 6.   Skin: Skin is warm, diaphoretic and no rash. Capillary refill takes less than 2 seconds.   Psychiatric: He experiences Normal  attention. His speech is normal and behavior is normal. Thought content normal.   Nursing note and vitals reviewed.    Results for orders placed or performed in visit on 03/24/24   POCT Influenza A/B MOLECULAR   Result Value Ref Range    POC Molecular Influenza A Ag Negative Negative, Not Reported    POC Molecular Influenza B Ag Negative Negative, Not Reported     Acceptable Yes    SARS Coronavirus 2 Antigen, POCT Manual Read   Result Value Ref Range    SARS Coronavirus 2 Antigen Negative Negative     Acceptable Yes    POCT Strep A, Molecular   Result Value Ref Range    Molecular Strep A, POC Positive (A) Negative     Acceptable Yes          Assessment:     1. Strep pharyngitis    2. Fever, unspecified fever cause    3. Encounter for screening for other viral diseases      Note dictated with voice recognition software, please excuse any grammatical errors.    Patient presents with clinical exam findings and history consistent with above.  We discussed the differential diagnosis.    On exam, patient is nontoxic appearing and vitals are stable.  Patient is essentially neurovascularly intact on exam.      Diagnostic testing results were independently reviewed and interpreted, which were discussed in depth with patient.   Test ordered in clinic:  COVID and flu negative.  Strep A positive.  Additionally, previous progress notes/admissions/lab were reviewed and interpreted.  BMP 02/20/2024 and CMP 01/19/2024 with Good kidney function and EGFR.      Plan:     Given 1 g oral Tylenol and 600 oral ibuprofen in clinic given his fever and symptoms.  Has improvements in symptoms.  Prescribed amoxicillin 10 day course for strep a and given work note.    Strep pharyngitis  -     amoxicillin (AMOXIL) 500 MG capsule; Take 1 capsule (500 mg total) by mouth every 12 (twelve) hours. for 10 days  Dispense: 20 capsule; Refill: 0    Fever, unspecified fever cause  -     acetaminophen tablet  1,000 mg  -     ibuprofen tablet 600 mg  -     POCT Strep A, Molecular    Encounter for screening for other viral diseases  -     POCT Influenza A/B MOLECULAR  -     SARS Coronavirus 2 Antigen, POCT Manual Read      Note dictated with voice recognition software, please excuse any grammatical errors.    We had shared decision making for patient's treatment. We discussed side effects/alternatives/benefits/risk and patient would like to proceed with treatment plan. We also discussed other OTC treatment recommendations.    Patient was counseled, explained with the test results meaning, expected course, and answered all of questions. They can also receive results via my chart.      Patient was instructed to return for re-evaluation with urgent care or PCP for continued outpatient workup and management if symptoms do not improve/worsening symptoms. Strict ED versus clinic precautions given in depth.   Guideline, discharge and follow-up instructions given verbally/printed; patient will also receive via [employer]. Patient verbalized understanding and agreed with the entirety of plan of care.         Additional MDM:     Heart Failure Score:   COPD = No          Patient Instructions     PLEASE READ YOUR DISCHARGE INSTRUCTIONS ENTIRELY AS IT CONTAINS IMPORTANT INFORMATION.    Patient had covid testing done today.    Discussed corona virus precautions and reviewed CDC FAC; printed a copy for patient.  I discussed to continue to monitor their symptoms. Discussed that if their symptoms persist or worsen to seek re-evaluation. Clinic vs. ER precautions were given.  Patient verbalized understanding and agreed with the entire plan of care.    If Negative and no direct exposure: symptom free without fever reducing meds in 24 hours - can go back to work in 24 hours.    - Reviewed radiographs and all diagnostic testing with patient/family.    - Rest.  Drink plenty of fluids.    - Tylenol OR anti-inflammatory (NSAIDs, ibuprofen, aleve,  motrin) as directed as needed for fever/pain.  For Tylenol, do not exceed 3000 mg/ day. If no contraindication or allergies.  -OK to supplement with OTC DayQuil, NyQuil or TheraFlu every 6 hours as needed for cough and congestion.  Use caution of total amount of Tylenol/acetaminophen per day.  - If you were prescribed antibiotics, please take them to completion. Please supplement with OTC probiotics and yogurt.  Contact clinic if develop profuse diarrhea and weakness.      -Below are suggestions for symptomatic relief:              -Salt water gargles to soothe throat pain.              -Chloroseptic spray also helps to numb throat pain. Drink hot tea with honey or lemon to soothe your throat.              -Nasal saline spray reduces inflammation and dryness.              -Warm face compresses to help with facial sinus pain/pressure.              -Vicks vapor rub at night.             **may also supplement with OTC nasal spray to help with inflammation and congestion.   Wean to off when you nose becomes to dry or bleed. Also use nasal saline twice a day to help with dryness.               -Flonase OTC or Nasacort OTC  once or twice a day for nasal/sinus congestion. DON'T USE IF YOU HAVE GLAUCOMA. CHECK WITH YOUR PHARMACIST/EYE PHYSICIAN.              -Simple foods like chicken noodle soup.              -Mucinex DM (ANY COUGH EXPECTORANT-- guaifenesin) for cough or chest congestion with mucus and (ANY COUGH SUPPRESSANT- dextromethorphan) helps with coughing every 12 hours. Mucinex-DM if you have chest congestion or sputum (caution if history of high blood pressure or palpitations).              -Zyrtec/Claritin/xyzal during the day time  & Benadryl at night (only if severe runny nose) may help with allergies and runny nose. Add decongestant if you have nasal/sinus congestion/sinus pressure/ear fullness sensation. (see below)              -may take OTC meclizine as needed for dizziness or nausea.         -You must  understand that you've received an Urgent Care treatment only and that you may be released before all your medical problems are known or treated. You, the patient, will arrange for follow up care as instructed. Please arrange follow up with your primary medical clinic within 2-5 days if your signs and symptoms have not resolved or worsen.     - Follow up with your PCP or specialty clinic as directed.  You can call [phone] or [phone] to schedule an appointment with the appropriate provider.  Schedule CENTER is open Mon-Friday 8-5pm (excluded holidays).    - If your condition worsens or fails to improve we recommend that you receive another evaluation at the emergency room immediately or contact your primary medical clinic to discuss your concerns.

## 2024-03-24 NOTE — LETTER
March 24, 2024      [facility] [address] [place] [facility] [address]  Phone: [phone]  Fax: [phone]       Patient: Sarkis Saez   YOB: 1975  Date of Visit: 03/24/2024    To Whom It May Concern:    Dalton Saez  was at [facility] on 03/24/2024. The patient may return to work/school on 3/26/24 with no restrictions. If you have any questions or concerns, or if I can be of further assistance, please do not hesitate to contact me.    Sincerely,    Brayden Reynolds PA-C

## 2024-04-18 RX ORDER — GABAPENTIN 300 MG/1
300 CAPSULE ORAL 3 TIMES DAILY
Qty: 72 CAPSULE | Refills: 2 | Status: SHIPPED | OUTPATIENT
Start: 2024-04-18

## 2024-05-04 ENCOUNTER — HOSPITAL ENCOUNTER (OUTPATIENT)
Dept: RADIOLOGY | Facility: HOSPITAL | Age: 49
Discharge: HOME | End: 2024-05-04
Attending: PHYSICIAN ASSISTANT
Payer: COMMERCIAL

## 2024-05-04 DIAGNOSIS — M54.9 DORSALGIA, UNSPECIFIED: ICD-10-CM

## 2024-05-04 PROCEDURE — 72148 MRI LUMBAR SPINE W/O DYE: CPT | Mod: TC

## 2024-05-04 PROCEDURE — 72148 MRI LUMBAR SPINE W/O DYE: CPT | Mod: 26,,, | Performed by: RADIOLOGY

## 2024-05-06 ENCOUNTER — TELEPHONE (OUTPATIENT)
Dept: NEUROSURGERY | Facility: CLINIC | Age: 49
End: 2024-05-06
Payer: COMMERCIAL

## 2024-05-09 ENCOUNTER — OFFICE VISIT (OUTPATIENT)
Dept: NEUROSURGERY | Facility: CLINIC | Age: 49
End: 2024-05-09
Payer: COMMERCIAL

## 2024-05-09 VITALS — HEART RATE: 81 BPM | DIASTOLIC BLOOD PRESSURE: 79 MMHG | SYSTOLIC BLOOD PRESSURE: 151 MMHG

## 2024-05-09 DIAGNOSIS — M47.26 OTHER SPONDYLOSIS WITH RADICULOPATHY, LUMBAR REGION: ICD-10-CM

## 2024-05-09 DIAGNOSIS — M51.26 HNP (HERNIATED NUCLEUS PULPOSUS), LUMBAR: Primary | ICD-10-CM

## 2024-05-09 DIAGNOSIS — M54.16 LUMBAR RADICULOPATHY: ICD-10-CM

## 2024-05-09 PROCEDURE — 99214 OFFICE O/P EST MOD 30 MIN: CPT | Mod: S$GLB,,, | Performed by: PHYSICIAN ASSISTANT

## 2024-05-09 PROCEDURE — 1160F RVW MEDS BY RX/DR IN RCRD: CPT | Mod: CPTII,S$GLB,, | Performed by: PHYSICIAN ASSISTANT

## 2024-05-09 PROCEDURE — 1159F MED LIST DOCD IN RCRD: CPT | Mod: CPTII,S$GLB,, | Performed by: PHYSICIAN ASSISTANT

## 2024-05-09 PROCEDURE — 3078F DIAST BP <80 MM HG: CPT | Mod: CPTII,S$GLB,, | Performed by: PHYSICIAN ASSISTANT

## 2024-05-09 PROCEDURE — 3077F SYST BP >= 140 MM HG: CPT | Mod: CPTII,S$GLB,, | Performed by: PHYSICIAN ASSISTANT

## 2024-05-09 PROCEDURE — 4010F ACE/ARB THERAPY RXD/TAKEN: CPT | Mod: CPTII,S$GLB,, | Performed by: PHYSICIAN ASSISTANT

## 2024-05-09 PROCEDURE — 99999 PR PBB SHADOW E&M-EST. PATIENT-LVL III: CPT | Mod: PBBFAC,,, | Performed by: PHYSICIAN ASSISTANT

## 2024-05-09 PROCEDURE — 3044F HG A1C LEVEL LT 7.0%: CPT | Mod: CPTII,S$GLB,, | Performed by: PHYSICIAN ASSISTANT

## 2024-05-10 ENCOUNTER — TELEPHONE (OUTPATIENT)
Dept: PAIN MEDICINE | Facility: CLINIC | Age: 49
End: 2024-05-10
Payer: COMMERCIAL

## 2024-05-10 DIAGNOSIS — M51.26 HERNIATED NUCLEUS PULPOSUS, LUMBAR: Primary | ICD-10-CM

## 2024-05-10 DIAGNOSIS — M54.16 LUMBAR RADICULOPATHY: ICD-10-CM

## 2024-05-10 DIAGNOSIS — M47.26 OTHER SPONDYLOSIS WITH RADICULOPATHY, LUMBAR REGION: ICD-10-CM

## 2024-05-10 NOTE — TELEPHONE ENCOUNTER
----- Message from Yamilet Peng PA-C sent at 2024  4:42 PM CDT -----  Regarding: Order for DIANA DRUMMOND JR.    Patient Name: DIANA DRUMMOND JR.([id number])  Sex: Male  : 1975      PCP: KULDIP BARCENAS    Center: [facility]     Types of orders made on 2024: Procedure Request    Order Date:2024  Ordering User:YAMILET PENG [[id number]  Encounter Provider:Yamilet Peng PA-C [[id number]  ]  Authorizing Provider: Yamilet Peng PA-C [[id number]]  Supervising Provider:LISE RUIZ [[id number]]  Type of Supervision:Supervision Required  Department:[facility] NEUROSURGERY[[id number]]    Common Order Information  Procedure -> Transforaminal Injection (Specify level and laterality) Cmt: Right             L4 TF ZENIA and right L5 TF ZENIA    Order Specific Information  Order: Procedure Request Order for Pain Man  agement [Custom: [id number]]  Order #:          [id number]: 1 FUTURE    Priority: Routine  Class: Clinic Performed    Future Order Information      Expires on:2025            Expected by:2024                   Associated Diagnoses      M51.26 HNP (herniated nucleus pulposus), lumbar      M54.16 Lumbar radiculopathy      M47.26 Other spondylosis with radiculopathy, lumbar region      [facility]         Facility Name: -> [facility]           Priority: Routine  Class: Clinic Performed    Future Order Information      Expires on:2025            Expected by:2024                   Associated Diagnoses      M51.26 HNP (herniated nucleus pulposus), lumbar      M54.16 Lumbar radiculopathy      M47.26 Other spondylosis with radiculopathy, lumbar region      Procedure -> Transforaminal Injection (Sp  ecify level and laterality) Cmt:                 Right L4 TF ZENIA and right L5 TF ZENIA        Physician -> [facility]         Facility Name: -> [facility]

## 2024-05-10 NOTE — PROGRESS NOTES
Subjective:     Patient ID:  Sarkis Saez Jr. is a 49 y.o. male.    Jamie    Chief Complaint:  Back pain and right leg pain    Interval History:   05/10/2024    Patient continues to have pain in the right low back with radiation down the right lateral leg to the ankle.  Worse with activity bending walking standing and better with sitting.  He tried diclofenac and Mobic and Medrol pack without relief.  He is taking gabapentin 3 times a day 300 mg without relief.  He denies any left leg pain.      HPI       Sarkis Saez Jr. is a 49 y.o. male who presents with the above CC.  I had seen this patient in 2019 for similar symptoms.  Eventually the pain went away.  He took diclofenac and gabapentin at that time.  Patient states in August 2023 he started having pain in the right low back right buttock and right lateral lower leg.  It has gotten better since then but still happens especially in certain positions that he is in.  He describes the pain as burning sensation in the lower leg.  It tends to be worse in the morning and when putting pressure on the right side and better with standing.  Denies any left leg pain or paresthesias.  No weakness in the legs.    Patient has had PT in the past.  No ESIs.  No spine surgery.  Patient is currently taking ibuprofen PRN.    Patient denies any recent accidents or trauma, no saddle anesthesias, and no bowel or bladder incontinence.      Review of Systems:    Review of Systems   Constitutional:  Negative for chills, diaphoresis, fever, malaise/fatigue and weight loss.   HENT:  Positive for sinus pain. Negative for congestion, ear discharge, ear pain, hearing loss, nosebleeds, sore throat and tinnitus.    Eyes:  Negative for blurred vision, double vision, photophobia, pain, discharge and redness.   Respiratory:  Positive for cough. Negative for hemoptysis, sputum production, shortness of breath, wheezing and stridor.    Cardiovascular:  Negative for chest pain,  palpitations, orthopnea, leg swelling and PND.   Gastrointestinal:  Negative for abdominal pain, blood in stool, constipation, diarrhea, heartburn, melena, nausea and vomiting.   Genitourinary:  Negative for dysuria, flank pain, frequency, hematuria and urgency.   Musculoskeletal:  Positive for back pain. Negative for falls, joint pain, myalgias and neck pain.   Skin:  Negative for itching and rash.   Neurological:  Negative for dizziness, tingling, tremors, sensory change, speech change, seizures, loss of consciousness, weakness and headaches.   Endo/Heme/Allergies:  Negative for environmental allergies and polydipsia. Does not bruise/bleed easily.   Psychiatric/Behavioral:  Positive for depression. Negative for hallucinations, memory loss and substance abuse. The patient is nervous/anxious. The patient does not have insomnia.          Past Medical History:   Diagnosis Date    Anxiety     Hypertension     Obesity      History reviewed. No pertinent surgical history.  Current Outpatient Medications on File Prior to Visit   Medication Sig Dispense Refill    amLODIPine (NORVASC) 10 MG tablet Take 1 tablet (10 mg total) by mouth once daily. 90 tablet 3    aspirin (ECOTRIN) 81 MG EC tablet Take 81 mg by mouth once daily.      chlorhexidine (HIBICLENS) 4 % external liquid Apply topically daily as needed (staph decolonization). 946 mL 11    clonazePAM (KLONOPIN) 0.5 MG tablet Take 1 tablet (0.5 mg total) by mouth 2 (two) times a day. 60 tablet 5    diclofenac (VOLTAREN) 75 MG EC tablet Take 1 tablet (75 mg total) by mouth 2 (two) times daily as needed (pain). 60 tablet 2    DULoxetine (CYMBALTA) 30 MG capsule Take 30 mg by mouth once daily. Plus 60 mg for total daily 90 mg      DULoxetine (CYMBALTA) 60 MG capsule Take 90 mg by mouth once daily.  1    gabapentin (NEURONTIN) 300 MG capsule Take 1 capsule (300 mg total) by mouth 3 (three) times daily. 72 capsule 2    meloxicam (MOBIC) 15 MG tablet Take 1 tablet (15 mg total)  by mouth daily as needed for Pain. 90 tablet 0    methylPREDNISolone (MEDROL DOSEPACK) 4 mg tablet use as directed 1 each 0    multivitamin capsule Take 1 capsule by mouth once daily.      mupirocin (BACTROBAN) 2 % ointment by Nasal route once daily. Apply to affected area 3 times daily 22 g 11    tadalafiL (CIALIS) 20 MG Tab Take 1 tablet (20 mg total) by mouth daily as needed. 30 tablet 0    valsartan (DIOVAN) 320 MG tablet Take 1 tablet (320 mg total) by mouth once daily. 90 tablet 3     No current facility-administered medications on file prior to visit.     Review of patient's allergies indicates:   Allergen Reactions    Doxycycline Hives     Social History     Socioeconomic History    Marital status: Legally    Tobacco Use    Smoking status: Former     Types: Cigars     Quit date: 2014     Years since quittin.4    Smokeless tobacco: Never   Substance and Sexual Activity    Alcohol use: Yes   Social History Narrative    Pt is  at [employer].      Social Determinants of Health     Financial Resource Strain: Medium Risk (2024)    Overall Financial Resource Strain (CARDIA)     Difficulty of Paying Living Expenses: Somewhat hard   Food Insecurity: No Food Insecurity (2024)    Hunger Vital Sign     Worried About Running Out of Food in the Last Year: Never true     Ran Out of Food in the Last Year: Never true   Transportation Needs: No Transportation Needs (2024)    PRAPARE - Transportation     Lack of Transportation (Medical): No     Lack of Transportation (Non-Medical): No   Physical Activity: Insufficiently Active (2024)    Exercise Vital Sign     Days of Exercise per Week: 2 days     Minutes of Exercise per Session: 20 min   Stress: No Stress Concern Present (2024)    [employer] [place] [employer] - Occupational Stress Questionnaire     Feeling of Stress : Only a little   Housing Stability: Low Risk  (2024)    Housing Stability Vital  Sign     Unable to Pay for Housing in the Last Year: No     Number of Places Lived in the Last Year: 1     Unstable Housing in the Last Year: No     Family History   Problem Relation Name Age of Onset    Diabetes Father      Hypertension Father      Heart disease Maternal Grandfather      Heart disease Paternal Grandfather         Objective:      Vitals:    05/09/24 1618   BP: (!) 151/79   Pulse: 81   PainSc:   5   PainLoc: Back         Physical Exam:      General:  Sarkis Saez Jr. is well-developed, well-nourished, appears stated age, in no acute distress, alert and oriented to person, place, and time.    Pulmonary/Chest:  Respiratory effort normal  Abdominal: Exhibits no distension  Psychiatric:  Normal mood and affect.  Behavior is normal.  Judgement and thought content normal      Musculoskeletal:    Lumbar ROM:   No pain in lumbar extension and right lateral bending.  Pain in flexion and right lateral bending.    Lumbar Spine Inspection:  Normal with no surgical scars and no visible rashes.    Lumbar Spine Palpation:  No tenderness to low back palpation.    SI Joint Palpation:  No tenderness to SI Joint palpation.      Neurological:     Muscle strength against resistance:     Right Left   Hip flexion  5 / 5 5 / 5   Hip extension 5 / 5 5 / 5   Hip abduction 5 / 5 5 / 5   Hip adduction  5 / 5 5 / 5   Knee extension  5 / 5 5 / 5   Knee flexion 5 / 5 5 / 5   Dorsiflexion  5 / 5 5 / 5   EHL  5 / 5 5 / 5   Plantar flexion  5 / 5 5 / 5   Inversion of the feet 5 / 5 5 / 5   Eversion of the feet  5 / 5 5 / 5       On gross examination of the bilateral upper extremities, patient has full painfree ROM with no signs of clubbing, cyanosis, edema, or weakness.       XRAY/MRI Interpretation:     Lumbar spine xrays were personally reviewed today.  No fractures.  No movement on flexion and extension.  L5-S1DDD.    Lumbar spine MRI was personally reviewed today from 2024.  Degenerative disc at L5-S1. Central to right  paracentral L4-5 disc herniation.      Assessment:          1. HNP (herniated nucleus pulposus), lumbar    2. Lumbar radiculopathy    3. Other spondylosis with radiculopathy, lumbar region            Plan:          Orders Placed This Encounter    Procedure Request Order for Pain Management     Degenerative disc at L5-S1. Central to right paracentral L4-5 disc herniation.    -Increase gabapentin to 600 mg 3 times a day over the next week  -right L4 and right L5 transforaminal epidural steroid injection with [facility]  -Follow up in 2 months potentially consider surgical consult if no relief    Follow-Up:  Follow up in about 2 months (around 7/9/2024). If there are any questions prior to this, the patient was instructed to contact the office.       JABARI Wiggins, PA-C  Neurosurgery  [facility]  05/10/2024

## 2024-05-10 NOTE — H&P (VIEW-ONLY)
Subjective:     Patient ID:  Sarkis Saez Jr. is a 49 y.o. male.    Jamie    Chief Complaint:  Back pain and right leg pain    Interval History:   05/10/2024    Patient continues to have pain in the right low back with radiation down the right lateral leg to the ankle.  Worse with activity bending walking standing and better with sitting.  He tried diclofenac and Mobic and Medrol pack without relief.  He is taking gabapentin 3 times a day 300 mg without relief.  He denies any left leg pain.      HPI       Sarkis Saez Jr. is a 49 y.o. male who presents with the above CC.  I had seen this patient in 2019 for similar symptoms.  Eventually the pain went away.  He took diclofenac and gabapentin at that time.  Patient states in August 2023 he started having pain in the right low back right buttock and right lateral lower leg.  It has gotten better since then but still happens especially in certain positions that he is in.  He describes the pain as burning sensation in the lower leg.  It tends to be worse in the morning and when putting pressure on the right side and better with standing.  Denies any left leg pain or paresthesias.  No weakness in the legs.    Patient has had PT in the past.  No ESIs.  No spine surgery.  Patient is currently taking ibuprofen PRN.    Patient denies any recent accidents or trauma, no saddle anesthesias, and no bowel or bladder incontinence.      Review of Systems:    Review of Systems   Constitutional:  Negative for chills, diaphoresis, fever, malaise/fatigue and weight loss.   HENT:  Positive for sinus pain. Negative for congestion, ear discharge, ear pain, hearing loss, nosebleeds, sore throat and tinnitus.    Eyes:  Negative for blurred vision, double vision, photophobia, pain, discharge and redness.   Respiratory:  Positive for cough. Negative for hemoptysis, sputum production, shortness of breath, wheezing and stridor.    Cardiovascular:  Negative for chest pain,  palpitations, orthopnea, leg swelling and PND.   Gastrointestinal:  Negative for abdominal pain, blood in stool, constipation, diarrhea, heartburn, melena, nausea and vomiting.   Genitourinary:  Negative for dysuria, flank pain, frequency, hematuria and urgency.   Musculoskeletal:  Positive for back pain. Negative for falls, joint pain, myalgias and neck pain.   Skin:  Negative for itching and rash.   Neurological:  Negative for dizziness, tingling, tremors, sensory change, speech change, seizures, loss of consciousness, weakness and headaches.   Endo/Heme/Allergies:  Negative for environmental allergies and polydipsia. Does not bruise/bleed easily.   Psychiatric/Behavioral:  Positive for depression. Negative for hallucinations, memory loss and substance abuse. The patient is nervous/anxious. The patient does not have insomnia.          Past Medical History:   Diagnosis Date    Anxiety     Hypertension     Obesity      History reviewed. No pertinent surgical history.  Current Outpatient Medications on File Prior to Visit   Medication Sig Dispense Refill    amLODIPine (NORVASC) 10 MG tablet Take 1 tablet (10 mg total) by mouth once daily. 90 tablet 3    aspirin (ECOTRIN) 81 MG EC tablet Take 81 mg by mouth once daily.      chlorhexidine (HIBICLENS) 4 % external liquid Apply topically daily as needed (staph decolonization). 946 mL 11    clonazePAM (KLONOPIN) 0.5 MG tablet Take 1 tablet (0.5 mg total) by mouth 2 (two) times a day. 60 tablet 5    diclofenac (VOLTAREN) 75 MG EC tablet Take 1 tablet (75 mg total) by mouth 2 (two) times daily as needed (pain). 60 tablet 2    DULoxetine (CYMBALTA) 30 MG capsule Take 30 mg by mouth once daily. Plus 60 mg for total daily 90 mg      DULoxetine (CYMBALTA) 60 MG capsule Take 90 mg by mouth once daily.  1    gabapentin (NEURONTIN) 300 MG capsule Take 1 capsule (300 mg total) by mouth 3 (three) times daily. 72 capsule 2    meloxicam (MOBIC) 15 MG tablet Take 1 tablet (15 mg total)  by mouth daily as needed for Pain. 90 tablet 0    methylPREDNISolone (MEDROL DOSEPACK) 4 mg tablet use as directed 1 each 0    multivitamin capsule Take 1 capsule by mouth once daily.      mupirocin (BACTROBAN) 2 % ointment by Nasal route once daily. Apply to affected area 3 times daily 22 g 11    tadalafiL (CIALIS) 20 MG Tab Take 1 tablet (20 mg total) by mouth daily as needed. 30 tablet 0    valsartan (DIOVAN) 320 MG tablet Take 1 tablet (320 mg total) by mouth once daily. 90 tablet 3     No current facility-administered medications on file prior to visit.     Review of patient's allergies indicates:   Allergen Reactions    Doxycycline Hives     Social History     Socioeconomic History    Marital status: Legally    Tobacco Use    Smoking status: Former     Types: Cigars     Quit date: 2014     Years since quittin.4    Smokeless tobacco: Never   Substance and Sexual Activity    Alcohol use: Yes   Social History Narrative    Pt is  at [employer].      Social Determinants of Health     Financial Resource Strain: Medium Risk (2024)    Overall Financial Resource Strain (CARDIA)     Difficulty of Paying Living Expenses: Somewhat hard   Food Insecurity: No Food Insecurity (2024)    Hunger Vital Sign     Worried About Running Out of Food in the Last Year: Never true     Ran Out of Food in the Last Year: Never true   Transportation Needs: No Transportation Needs (2024)    PRAPARE - Transportation     Lack of Transportation (Medical): No     Lack of Transportation (Non-Medical): No   Physical Activity: Insufficiently Active (2024)    Exercise Vital Sign     Days of Exercise per Week: 2 days     Minutes of Exercise per Session: 20 min   Stress: No Stress Concern Present (2024)    [employer] - Occupational Stress Questionnaire     Feeling of Stress : Only a little   Housing Stability: Low Risk  (2024)    Housing Stability Vital  Sign     Unable to Pay for Housing in the Last Year: No     Number of Places Lived in the Last Year: 1     Unstable Housing in the Last Year: No     Family History   Problem Relation Name Age of Onset    Diabetes Father      Hypertension Father      Heart disease Maternal Grandfather      Heart disease Paternal Grandfather         Objective:      Vitals:    05/09/24 1618   BP: (!) 151/79   Pulse: 81   PainSc:   5   PainLoc: Back         Physical Exam:      General:  Sarkis Saez Jr. is well-developed, well-nourished, appears stated age, in no acute distress, alert and oriented to person, place, and time.    Pulmonary/Chest:  Respiratory effort normal  Abdominal: Exhibits no distension  Psychiatric:  Normal mood and affect.  Behavior is normal.  Judgement and thought content normal      Musculoskeletal:    Lumbar ROM:   No pain in lumbar extension and right lateral bending.  Pain in flexion and right lateral bending.    Lumbar Spine Inspection:  Normal with no surgical scars and no visible rashes.    Lumbar Spine Palpation:  No tenderness to low back palpation.    SI Joint Palpation:  No tenderness to SI Joint palpation.      Neurological:     Muscle strength against resistance:     Right Left   Hip flexion  5 / 5 5 / 5   Hip extension 5 / 5 5 / 5   Hip abduction 5 / 5 5 / 5   Hip adduction  5 / 5 5 / 5   Knee extension  5 / 5 5 / 5   Knee flexion 5 / 5 5 / 5   Dorsiflexion  5 / 5 5 / 5   EHL  5 / 5 5 / 5   Plantar flexion  5 / 5 5 / 5   Inversion of the feet 5 / 5 5 / 5   Eversion of the feet  5 / 5 5 / 5       On gross examination of the bilateral upper extremities, patient has full painfree ROM with no signs of clubbing, cyanosis, edema, or weakness.       XRAY/MRI Interpretation:     Lumbar spine xrays were personally reviewed today.  No fractures.  No movement on flexion and extension.  L5-S1DDD.    Lumbar spine MRI was personally reviewed today from 2024.  Degenerative disc at L5-S1. Central to right  paracentral L4-5 disc herniation.      Assessment:          1. HNP (herniated nucleus pulposus), lumbar    2. Lumbar radiculopathy    3. Other spondylosis with radiculopathy, lumbar region            Plan:          Orders Placed This Encounter    Procedure Request Order for Pain Management     Degenerative disc at L5-S1. Central to right paracentral L4-5 disc herniation.    -Increase gabapentin to 600 mg 3 times a day over the next week  -right L4 and right L5 transforaminal epidural steroid injection with [facility]  -Follow up in 2 months potentially consider surgical consult if no relief    Follow-Up:  Follow up in about 2 months (around 7/9/2024). If there are any questions prior to this, the patient was instructed to contact the office.       JABARI Wiggins, PA-C  Neurosurgery  [facility]  05/10/2024

## 2024-05-13 ENCOUNTER — TELEPHONE (OUTPATIENT)
Dept: PAIN MEDICINE | Facility: CLINIC | Age: 49
End: 2024-05-13
Payer: COMMERCIAL

## 2024-05-15 ENCOUNTER — PATIENT MESSAGE (OUTPATIENT)
Dept: INTERNAL MEDICINE | Facility: CLINIC | Age: 49
End: 2024-05-15
Payer: COMMERCIAL

## 2024-05-17 RX ORDER — GABAPENTIN 600 MG/1
600 TABLET ORAL 3 TIMES DAILY
Qty: 270 TABLET | Refills: 0 | Status: SHIPPED | OUTPATIENT
Start: 2024-05-17 | End: 2025-05-17

## 2024-05-17 RX ORDER — GABAPENTIN 300 MG/1
CAPSULE ORAL
Qty: 54 CAPSULE | Refills: 2 | OUTPATIENT
Start: 2024-05-17

## 2024-05-27 ENCOUNTER — TELEPHONE (OUTPATIENT)
Dept: INTERNAL MEDICINE | Facility: CLINIC | Age: 49
End: 2024-05-27
Payer: COMMERCIAL

## 2024-05-27 ENCOUNTER — TELEPHONE (OUTPATIENT)
Dept: PAIN MEDICINE | Facility: CLINIC | Age: 49
End: 2024-05-27
Payer: COMMERCIAL

## 2024-05-27 ENCOUNTER — PATIENT MESSAGE (OUTPATIENT)
Dept: NEUROSURGERY | Facility: CLINIC | Age: 49
End: 2024-05-27
Payer: COMMERCIAL

## 2024-05-28 RX ORDER — GABAPENTIN 600 MG/1
600 TABLET ORAL 3 TIMES DAILY
Qty: 270 TABLET | Refills: 0 | OUTPATIENT
Start: 2024-05-28 | End: 2025-05-28

## 2024-05-28 NOTE — TELEPHONE ENCOUNTER
I called the patient and apologized for the confusion regarding the medication.  He is also unhappy with his appointment being changed at his last visit.    I told him I would reach out to our supervisor regarding his concerns.    JABARI Wiggins, PA-C  Neurosurgery  [facility]  05/28/2024

## 2024-05-30 ENCOUNTER — TELEPHONE (OUTPATIENT)
Dept: PAIN MEDICINE | Facility: CLINIC | Age: 49
End: 2024-05-30
Payer: COMMERCIAL

## 2024-05-30 NOTE — TELEPHONE ENCOUNTER
----- Message from Dawson Lamas MA sent at 5/30/2024  1:17 PM CDT -----  Please advise....  ----- Message -----  From: Thais Wu  Sent: 5/30/2024  12:00 PM CDT  To: [facility] Staff    Type:  Procedure Questions    Who Called: Pt  Does the patient know what this is regarding?: Pt would like to know if tylenol is included in the aspirin that he cannot take prior to procedure  Would the patient rather a call back or a response via [facility]? [facility]  Best Call Back Number: [phone]  Additional Information:

## 2024-05-30 NOTE — PRE-PROCEDURE INSTRUCTIONS
Patient reviewed on 5/30/2024.  Okay to proceed at [facility]. The following pre-procedure instructions and arrival time have been sent to patient portal for review.  Patient reviewed portal message.       Dear Sarkis ,     You are scheduled for a procedure with Dr. Dimas on 6/3/2024.    Your scheduled arrival time is 11:30 am.  This arrival time is roughly 1 hour before your anticipated procedure time to allow sufficient time for pre-op..    Please wear comfortable clothes. You will be placed in a gown for your procedure.  Please do not wear a dress.  This procedure will take place at the [facility] at the corner of [facility] and [facility].  It is in the [place] next to [facility].  The address is:     [address] [place] [address].  [place] [id number]     After entering the building, you will proceed to the second floor where you can check in with registration. You should take any medications that you routinely take for blood pressure, heart medications, thyroid, cholesterol, etc.      The fasting restrictions are dependent on whether or not you are receiving sedation.  Sedation is not available for all procedures.      Your fasting instructions are as follow:  IV sedation. You should not eat for 8 hours and can only drink clear liquids (water or black coffee without cream/sugar) up until 2 hours before your scheduled time.  You CANNOT drive yourself and must have a .     If you are on blood thinners, you need to follow the anticoagulation instructions that had been discussed previously.  You should only stop the blood thinners if it was approved by your primary care physician or your cardiologist.  In the event that you are not able to stop your blood thinners, a blood thinner was not listed on your medication list, or we were not able to get clearance from your cardiologist, then the procedure may have to be postponed/canceled.      IF you were told to stop your  blood thinners, this is how long you should generally hold some of the more common ones.  Remember that stopping blood thinners is only necessary for certain procedures. If you are unsure of your instructions, please call us.   Aspirin - 5 days  Plavix/Clopidogrel - 7 days  Warfarin / Coumadin - 5 days  Eliquis - 3 days  Pradaxa/Dabigatran - 4 days  Xarelto/Rivaroxaban - 3 days     If you are a diabetic, do not take your medication if you will be fasting, but bring it with you. Please plan on being here for roughly 3 hours.     Please call us if you have been sick (running fever, having any flu-like symptoms) or have been taking antibiotics in the past 2 weeks or had any outpatient procedures other than with us (colonoscopy, endoscopy, OBGYN, dental, etc.). If you have been previously COVID positive, you will need to hold off on your procedure until you are symptom free for 10 days. If you did not have any symptoms, you can have your procedure 10 days from your positive test result.       *HOLD ALL VITAMINS, MINERALS, HERBS (INCLUDING HERBAL TEAS) AND SUPPLEMENTS  *SHOWER WITH ANTIBACTERIAL SOAP (EX. DIAL) NIGHT BEFORE AND MORNING OF PROCEDURE  *DO NOT APPLY ANY LOTIONS, OILS, POWDERS, PERFUME/COLOGNE, OINTMENTS, GELS, CREAMS, MAKEUP OR DEODORANT TO YOUR SKIN MORNING OF PROCEDURE  *LEAVE JEWELRY AND ANY VALUABLES AT HOME  *WEAR LOOSE COMFORTABLE CLOTHING (PREFERABLY A BUTTON UP SHIRT)     Please reply to this message as receipt of delivery.     Thank you,  [facility]  [place], LPN [facility]  Pre-Admit

## 2024-06-03 ENCOUNTER — HOSPITAL ENCOUNTER (OUTPATIENT)
Facility: HOSPITAL | Age: 49
Discharge: HOME | End: 2024-06-03
Attending: STUDENT IN AN ORGANIZED HEALTH CARE EDUCATION/TRAINING PROGRAM | Admitting: STUDENT IN AN ORGANIZED HEALTH CARE EDUCATION/TRAINING PROGRAM
Payer: COMMERCIAL

## 2024-06-03 VITALS
BODY MASS INDEX: 42.66 KG/M2 | WEIGHT: 315 LBS | TEMPERATURE: 98 F | HEART RATE: 80 BPM | DIASTOLIC BLOOD PRESSURE: 73 MMHG | OXYGEN SATURATION: 95 % | RESPIRATION RATE: 14 BRPM | HEIGHT: 72 IN | SYSTOLIC BLOOD PRESSURE: 145 MMHG

## 2024-06-03 DIAGNOSIS — M51.26 LUMBAR DISC HERNIATION: Primary | ICD-10-CM

## 2024-06-03 DIAGNOSIS — G89.29 CHRONIC PAIN: ICD-10-CM

## 2024-06-03 DIAGNOSIS — M54.16 LUMBAR RADICULOPATHY: ICD-10-CM

## 2024-06-03 PROCEDURE — 25000003 PHARM REV CODE 250: Performed by: STUDENT IN AN ORGANIZED HEALTH CARE EDUCATION/TRAINING PROGRAM

## 2024-06-03 PROCEDURE — 64483 NJX AA&/STRD TFRM EPI L/S 1: CPT | Mod: RT | Performed by: STUDENT IN AN ORGANIZED HEALTH CARE EDUCATION/TRAINING PROGRAM

## 2024-06-03 PROCEDURE — 25500020 PHARM REV CODE 255: Performed by: STUDENT IN AN ORGANIZED HEALTH CARE EDUCATION/TRAINING PROGRAM

## 2024-06-03 PROCEDURE — 64483 NJX AA&/STRD TFRM EPI L/S 1: CPT | Mod: RT,,, | Performed by: STUDENT IN AN ORGANIZED HEALTH CARE EDUCATION/TRAINING PROGRAM

## 2024-06-03 PROCEDURE — 63600175 PHARM REV CODE 636 W HCPCS: Performed by: STUDENT IN AN ORGANIZED HEALTH CARE EDUCATION/TRAINING PROGRAM

## 2024-06-03 RX ORDER — DEXAMETHASONE SODIUM PHOSPHATE 10 MG/ML
INJECTION INTRAMUSCULAR; INTRAVENOUS
Status: DISCONTINUED | OUTPATIENT
Start: 2024-06-03 | End: 2024-06-03 | Stop reason: HOSPADM

## 2024-06-03 RX ORDER — LIDOCAINE HYDROCHLORIDE 10 MG/ML
INJECTION, SOLUTION EPIDURAL; INFILTRATION; INTRACAUDAL; PERINEURAL
Status: DISCONTINUED | OUTPATIENT
Start: 2024-06-03 | End: 2024-06-03 | Stop reason: HOSPADM

## 2024-06-03 RX ORDER — SODIUM CHLORIDE 9 MG/ML
INJECTION, SOLUTION INTRAVENOUS CONTINUOUS
Status: DISCONTINUED | OUTPATIENT
Start: 2024-06-03 | End: 2024-06-03 | Stop reason: HOSPADM

## 2024-06-03 RX ORDER — FENTANYL CITRATE 50 UG/ML
INJECTION, SOLUTION INTRAMUSCULAR; INTRAVENOUS
Status: DISCONTINUED | OUTPATIENT
Start: 2024-06-03 | End: 2024-06-03 | Stop reason: HOSPADM

## 2024-06-03 RX ORDER — MIDAZOLAM HYDROCHLORIDE 1 MG/ML
INJECTION INTRAMUSCULAR; INTRAVENOUS
Status: DISCONTINUED | OUTPATIENT
Start: 2024-06-03 | End: 2024-06-03 | Stop reason: HOSPADM

## 2024-06-03 RX ORDER — LIDOCAINE HYDROCHLORIDE 20 MG/ML
INJECTION, SOLUTION EPIDURAL; INFILTRATION; INTRACAUDAL; PERINEURAL
Status: DISCONTINUED | OUTPATIENT
Start: 2024-06-03 | End: 2024-06-03 | Stop reason: HOSPADM

## 2024-06-03 NOTE — DISCHARGE INSTRUCTIONS
Home Care Instructions Pain Management:    1.  DIET:    You may resume your normal diet today.    2.  BATHING:    You may shower with luke warm water.    3.  DRESSING:    You may remove your bandage today.    4.  ACTIVITY LEVEL:      You may resume your normal activities 24 hours after your procedure.    5.  MEDICATIONS:    You may resume your normal medications today.    6.  SPECIAL INSTRUCTIONS:    No heat to the injection site for 24 hours including bath or shower, heating pad, moist heat or hot tubs.    Use an ice pack to the injection site for any pain or discomfort.  Apply ice packs for 20 minute intervals as needed.    If you have received any sedatives by mouth today, you can not drive for 12 hours.    If you have received sedation through an IV, you can not drive for 24 hours.    PLEASE CALL YOUR DOCTOR FOR THE FOLLOWIN.  Redness or swelling around the injection site.  2.  Fever of 101 degrees.  3.  Drainage (pus) from the injection site.  4.  For any continuous bleeding (some dried blood over the incision is normal.)    FOR EMERGENCIES:    If any unusual problems or difficulties occur during clinic hours, call [phone] or dial [phone].    Follow up with with your physician in 2-3 weeks.

## 2024-06-03 NOTE — INTERVAL H&P NOTE
HPI  Patient presenting for Procedure(s) (LRB):  TFESI RT L4/L5 (Right)     Patient on Anti-coagulation No    No health changes since previous encounter    Past Medical History:   Diagnosis Date    Anxiety     Hypertension     Obesity     Sleep apnea      History reviewed. No pertinent surgical history.  Review of patient's allergies indicates:   Allergen Reactions    Doxycycline Hives      Current Facility-Administered Medications   Medication    0.9%  NaCl infusion       PMHx, PSHx, Allergies, Medications reviewed in epic    ROS negative except pain complaints in HPI    OBJECTIVE:    BP (!) 175/95   Pulse 83   Temp 98.1 °F (36.7 °C) (Temporal)   Resp 16   Ht 6' (1.829 m)   Wt (!) 158.8 kg (350 lb)   SpO2 96%   BMI 47.47 kg/m²     PHYSICAL EXAMINATION:    GENERAL: Well appearing, in no acute distress, alert and oriented x3.  PSYCH:  Mood and affect appropriate.  SKIN: Skin color, texture, turgor normal, no rashes or lesions which will impact the procedure.  CV: RRR with palpation of the radial artery.  PULM: No evidence of respiratory difficulty, symmetric chest rise. Clear to auscultation.  NEURO: Cranial nerves grossly intact.    Plan:    Proceed with procedure as planned Procedure(s) (LRB):  TFESI RT L4/L5 (Right)    Mine Dimas  06/03/2024

## 2024-06-03 NOTE — OP NOTE
Lumbar Transforaminal Epidural Steroid Injection under Fluoroscopic Guidance    The procedure, risks, benefits, and options were discussed with the patient. There are no contraindications to the procedure. The patent expressed understanding and agreed to the procedure. Informed written consent was obtained prior to the start of the procedure and can be found in the patient's chart.    PATIENT NAME: Sarkis Saez Jr.   MRN: [id number]     DATE OF PROCEDURE: 06/03/2024    PROCEDURE:  Right  L4/5 Lumbar Transforaminal Epidural Steroid Injection under Fluoroscopic Guidance    PRE-OP DIAGNOSIS: Herniated nucleus pulposus, lumbar [M51.26]  Lumbar radiculopathy [M54.16]  Other spondylosis with radiculopathy, lumbar region [M47.26] Lumbar radiculopathy [M54.16]    POST-OP DIAGNOSIS: Same    PHYSICIAN: Mine Dimas DO    ASSISTANTS: None     MEDICATIONS INJECTED: Preservative-free Decadron 10mg with 2 cc of Lidocaine 1% MPF     LOCAL ANESTHETIC INJECTED: Xylocaine 2%     SEDATION: Versed 2mg and Fentanyl 50mcg                                                                                                                                                                                     Conscious sedation ordered by M.D. Patient re-evaluation prior to administration of conscious sedation. No changes noted in patient's status from initial evaluation. The patient's vital signs were monitored by RN and patient remained hemodynamically stable throughout the procedure.    Event Time In   Sedation Start 1255   Sedation End 1301       ESTIMATED BLOOD LOSS: None    COMPLICATIONS: None    TECHNIQUE: Time-out was performed to identify the patient and procedure to be performed. With the patient laying in a prone position, the surgical area was prepped and draped in the usual sterile fashion using ChloraPrep and a fenestrated drape.The levels were determined under fluoroscopy guidance. Skin anesthesia was achieved by injecting  Lidocaine 2% over the injection sites. The transforaminal spaces were then approached with a 22 gauge, 5 inch spinal quinke needle that was introduced under fluoroscopic guidance in the AP and Lateral views. Once the needle tip was in the area of the transforaminal space, and there was no blood, CSF or paraesthesias, contrast dye Omnipaque (300mg/mL) was injected to confirm placement and there was no vascular runoff. Fluoroscopic imaging in the AP and lateral views revealed a clear outline of the spinal nerve with proximal spread of agent through the neural foramen into the epidural space. 3 mL of the medication mixture listed above was injected slowly at each site. Displacement of the radio opaque contrast after injection of the medication confirmed that the medication went into the area of the transforaminal spaces. The needles were removed and bleeding was nil. A sterile dressing was applied. No specimens collected. The patient tolerated the procedure well.       The patient was monitored after the procedure in the recovery area. They were given post-procedure and discharge instructions to follow at home. The patient was discharged in a stable condition.      Mine Dimas DO

## 2024-06-03 NOTE — DISCHARGE SUMMARY
Discharge Note  Short Stay      SUMMARY     Admit Date: 6/3/2024    Attending Physician: Mine Dimas      Discharge Physician: Mine Dimas      Discharge Date: 6/3/2024 1:06 PM    Procedure(s) (LRB):  TFESI RT L4/L5 (Right)    Final Diagnosis: Herniated nucleus pulposus, lumbar [M51.26]  Lumbar radiculopathy [M54.16]  Other spondylosis with radiculopathy, lumbar region [M47.26]    Disposition: Home or self care    Patient Instructions:   Current Discharge Medication List        CONTINUE these medications which have NOT CHANGED    Details   amLODIPine (NORVASC) 10 MG tablet Take 1 tablet (10 mg total) by mouth once daily.  Qty: 90 tablet, Refills: 3    Comments: .  Associated Diagnoses: Hypertension, essential      clonazePAM (KLONOPIN) 0.5 MG tablet Take 1 tablet (0.5 mg total) by mouth 2 (two) times a day.  Qty: 60 tablet, Refills: 5    Comments: approved per samuel owusu      !! DULoxetine (CYMBALTA) 30 MG capsule Take 30 mg by mouth once daily. Plus 60 mg for total daily 90 mg      !! DULoxetine (CYMBALTA) 60 MG capsule Take 90 mg by mouth once daily.  Refills: 1      gabapentin (NEURONTIN) 600 MG tablet Take 1 tablet (600 mg total) by mouth 3 (three) times daily.  Qty: 270 tablet, Refills: 0      multivitamin capsule Take 1 capsule by mouth once daily.      tadalafiL (CIALIS) 20 MG Tab Take 1 tablet (20 mg total) by mouth daily as needed.  Qty: 30 tablet, Refills: 0    Associated Diagnoses: Erectile dysfunction, unspecified erectile dysfunction type      valsartan (DIOVAN) 320 MG tablet Take 1 tablet (320 mg total) by mouth once daily.  Qty: 90 tablet, Refills: 3    Comments: .  Associated Diagnoses: Hypertension, essential      aspirin (ECOTRIN) 81 MG EC tablet Take 81 mg by mouth once daily.      chlorhexidine (HIBICLENS) 4 % external liquid Apply topically daily as needed (staph decolonization).  Qty: 946 mL, Refills: 11    Associated Diagnoses: Skin infection      diclofenac (VOLTAREN) 75 MG EC  tablet Take 1 tablet (75 mg total) by mouth 2 (two) times daily as needed (pain).  Qty: 60 tablet, Refills: 2      gabapentin (NEURONTIN) 300 MG capsule Take 1 capsule (300 mg total) by mouth 3 (three) times daily.  Qty: 72 capsule, Refills: 2      meloxicam (MOBIC) 15 MG tablet Take 1 tablet (15 mg total) by mouth daily as needed for Pain.  Qty: 90 tablet, Refills: 0      methylPREDNISolone (MEDROL DOSEPACK) 4 mg tablet use as directed  Qty: 1 each, Refills: 0      mupirocin (BACTROBAN) 2 % ointment by Nasal route once daily. Apply to affected area 3 times daily  Qty: 22 g, Refills: 11    Associated Diagnoses: Skin infection       !! - Potential duplicate medications found. Please discuss with provider.              Discharge Diagnosis: Herniated nucleus pulposus, lumbar [M51.26]  Lumbar radiculopathy [M54.16]  Other spondylosis with radiculopathy, lumbar region [M47.26]  Condition on Discharge: Stable with no complications to procedure   Diet on Discharge: Same as before.  Activity: as per instruction sheet.  Discharge to: Home with a responsible adult.  Follow up: 2-4 weeks       Please call my office or pager at [phone] if experienced any weakness or loss of sensation, fever > 101.5, pain uncontrolled with oral medications, persistent nausea/vomiting/or diarrhea, redness or drainage from the incisions, or any other worrisome concerns. If physician on call was not reached or could not communicate with our office for any reason please go to the nearest emergency department

## 2024-06-04 ENCOUNTER — PATIENT MESSAGE (OUTPATIENT)
Dept: PAIN MEDICINE | Facility: HOSPITAL | Age: 49
End: 2024-06-04
Payer: COMMERCIAL

## 2024-06-10 ENCOUNTER — PATIENT MESSAGE (OUTPATIENT)
Dept: INTERNAL MEDICINE | Facility: CLINIC | Age: 49
End: 2024-06-10
Payer: COMMERCIAL

## 2024-06-19 ENCOUNTER — OFFICE VISIT (OUTPATIENT)
Dept: PAIN MEDICINE | Facility: CLINIC | Age: 49
End: 2024-06-19
Payer: COMMERCIAL

## 2024-06-19 DIAGNOSIS — G89.4 CHRONIC PAIN SYNDROME: ICD-10-CM

## 2024-06-19 DIAGNOSIS — M51.26 HERNIATED NUCLEUS PULPOSUS, LUMBAR: ICD-10-CM

## 2024-06-19 DIAGNOSIS — M54.16 LUMBAR RADICULOPATHY: ICD-10-CM

## 2024-06-19 DIAGNOSIS — M51.26 LUMBAR DISC HERNIATION: Primary | ICD-10-CM

## 2024-06-19 DIAGNOSIS — M47.26 OTHER SPONDYLOSIS WITH RADICULOPATHY, LUMBAR REGION: ICD-10-CM

## 2024-06-19 PROCEDURE — 4010F ACE/ARB THERAPY RXD/TAKEN: CPT | Mod: CPTII,95,, | Performed by: NURSE PRACTITIONER

## 2024-06-19 PROCEDURE — 1160F RVW MEDS BY RX/DR IN RCRD: CPT | Mod: CPTII,95,, | Performed by: NURSE PRACTITIONER

## 2024-06-19 PROCEDURE — 3044F HG A1C LEVEL LT 7.0%: CPT | Mod: CPTII,95,, | Performed by: NURSE PRACTITIONER

## 2024-06-19 PROCEDURE — 99214 OFFICE O/P EST MOD 30 MIN: CPT | Mod: 95,,, | Performed by: NURSE PRACTITIONER

## 2024-06-19 PROCEDURE — 1159F MED LIST DOCD IN RCRD: CPT | Mod: CPTII,95,, | Performed by: NURSE PRACTITIONER

## 2024-06-19 RX ORDER — TIZANIDINE 4 MG/1
4 TABLET ORAL NIGHTLY PRN
Qty: 30 TABLET | Refills: 0 | Status: SHIPPED | OUTPATIENT
Start: 2024-06-19 | End: 2024-07-19

## 2024-06-19 NOTE — PROGRESS NOTES
Chronic Pain - Established New Patient follow up Clinic Visit  telemedicine Encounter    Telemedicine Bundle:  This visit was completed during the Coronavirus Crisis to enhance patient safety.  The patient location is: patient's home  The chief complaint leading to consultation is: Low-back Pain and Leg Pain  Visit type: Virtual visit with synchronous audio and video  Total time spent with patient:  20 minutes  Each patient to whom he or she provides medical services by telemedicine is:    (1) informed of the relationship between the physician and patient and the respective role of any other health care provider with respect to management of the patient  (2) notified that he or she may decline to receive medical services by telemedicine and may withdraw from such care at any time.      Referring Physician: No ref. provider found    Chief Complaint:   Chief Complaint   Patient presents with    Low-back Pain    Leg Pain     Right      SUBJECTIVE: Interval History 6/19/2024:  50 y/o male that presents virtually, he was a direct procedure referral he is s/p  Right  L4/5 Lumbar TFESI 06/03/2024 80% relief of his symptoms for 2 days 80% and then the pain returned.  He reports continued relief of his right leg pain at 50% but the majority of his pain today is his right low back with radiating symptoms into his right hip.  He denies any paresthesia denies any profound weakness.  He states his worst pain is when he attempts to rise from a seated position.     Patient denies night fever/night sweats, urinary incontinence, bowel incontinence, significant weight loss, significant motor weakness, and loss of sensations.    Physical Therapy/Home Exercise: no  never prescribed    Per neurosurgery note-    HPI Sarkis Nolasconeyda Cook is a 49 y.o. male who presents with the above CC.  I had seen this patient in 2019 for similar symptoms.  Eventually the pain went away.  He took diclofenac and gabapentin at that time.  Patient states  in August 2023 he started having pain in the right low back right buttock and right lateral lower leg.  It has gotten better since then but still happens especially in certain positions that he is in.  He describes the pain as burning sensation in the lower leg.  It tends to be worse in the morning and when putting pressure on the right side and better with standing.  Denies any left leg pain or paresthesias.  No weakness in the legs.     Patient has had PT in the past.  No ESIs.  No spine surgery.  Patient is currently taking ibuprofen PRN.          Pain Disability Index Review:       No data to display                Pain Medications:  N/a      report:  Not applicable    Pain Procedures:   -06/03/2024  Right  L4/5 Lumbar Transforaminal Epidural 80% relief for 2 days and then pain returned    Current Medications:  Gabapentin 600 mg TID   Imaging:   MRI LUMBAR SPINE WITHOUT CONTRAST     CLINICAL HISTORY:  Low back pain, symptoms persist with > 6wks conservative treatment; Dorsalgia, unspecified     TECHNIQUE:  Multiplanar, multisequence MR images were acquired from the thoracolumbar junction to the sacrum without the administration of contrast.     COMPARISON:  01/30/2024     FINDINGS:  Alignment: Normal.     Vertebrae: 5 lumbar-type vertebral bodies. No aggressive marrow replacement process or fracture.Nobone marrow edema .     Discs: Desiccation of disc material L4-L5 and L5-S1.     Cord: Normal. Conus terminates at .     Degenerative findings:     T12-L1: There is no focal disc herniation. No significant central canal narrowing . No significant neural foraminal narrowing.     L1-L2: There is no focal disc herniation. No significant central canal narrowing . No significant neural foraminal narrowing.     L2-L3: There is no focal disc herniation. No significant central canal narrowing . No significant neural foraminal narrowing.     L3-L4: There is no focal disc herniation. No significant central canal narrowing  . No significant neural foraminal narrowing.     L4-L5: Posterior central disc extrusion with concavity upon the anterior thecal sac margins and abutment of the RIGHT L5 nerve root.  Note: This finding is improved from prior examination of 2016.  Facet hypertrophy with RIGHT-sided joint effusion .  Mild central canal narrowing . No significant neural foraminal narrowing.     L5-S1: There is no focal disc herniation.Mild facet hypertrophy.  No significant central canal narrowing . No significant neural foraminal narrowing.     Paraspinal muscles & soft tissues: Unremarkable.     Impression:     Posterior central disc extrusion L4-L5 with concavity upon the anterior thecal sac margins and abutment of RIGHT L5 nerve root.  Associated facet hypertrophy result in mild central canal narrowing.  Note: The findings are improved from prior examination of 2016 which at that time demonstrated a more significant disc extrusion.    Past Medical History:   Diagnosis Date    Anxiety     Hypertension     Obesity     Sleep apnea      Past Surgical History:   Procedure Laterality Date    INJECTION, SPINE, LUMBOSACRAL, TRANSFORAMINAL APPROACH Right 6/3/2024    Procedure: TFESI RT L4/L5;  Surgeon: Mine Dimas DO;  Location: [facility] PAIN [facility];  Service: Pain Management;  Laterality: Right;  20mins-ASA 5d     Social History     Socioeconomic History    Marital status:    Tobacco Use    Smoking status: Former     Types: Cigars     Quit date: 2014     Years since quittin.5    Smokeless tobacco: Never   Substance and Sexual Activity    Alcohol use: Yes   Social History Narrative    Pt is  at [employer].      Social Determinants of Health     Financial Resource Strain: Medium Risk (2024)    Overall Financial Resource Strain (CARDIA)     Difficulty of Paying Living Expenses: Somewhat hard   Food Insecurity: No Food Insecurity (2024)    Hunger Vital Sign     Worried About  Running Out of Food in the Last Year: Never true     Ran Out of Food in the Last Year: Never true   Transportation Needs: No Transportation Needs (1/31/2024)    PRAPARE - Transportation     Lack of Transportation (Medical): No     Lack of Transportation (Non-Medical): No   Physical Activity: Insufficiently Active (1/31/2024)    Exercise Vital Sign     Days of Exercise per Week: 2 days     Minutes of Exercise per Session: 20 min   Stress: No Stress Concern Present (1/31/2024)    [employer] - Occupational Stress Questionnaire     Feeling of Stress : Only a little   Housing Stability: Low Risk  (1/31/2024)    Housing Stability Vital Sign     Unable to Pay for Housing in the Last Year: No     Number of Places Lived in the Last Year: 1     Unstable Housing in the Last Year: No     Family History   Problem Relation Name Age of Onset    Diabetes Father      Hypertension Father      Heart disease Maternal Grandfather      Heart disease Paternal Grandfather         Review of patient's allergies indicates:   Allergen Reactions    Doxycycline Hives       Current Outpatient Medications   Medication Sig    amLODIPine (NORVASC) 10 MG tablet Take 1 tablet (10 mg total) by mouth once daily.    aspirin (ECOTRIN) 81 MG EC tablet Take 81 mg by mouth once daily.    chlorhexidine (HIBICLENS) 4 % external liquid Apply topically daily as needed (staph decolonization).    clonazePAM (KLONOPIN) 0.5 MG tablet Take 1 tablet (0.5 mg total) by mouth 2 (two) times a day.    diclofenac (VOLTAREN) 75 MG EC tablet Take 1 tablet (75 mg total) by mouth 2 (two) times daily as needed (pain).    DULoxetine (CYMBALTA) 30 MG capsule Take 30 mg by mouth once daily. Plus 60 mg for total daily 90 mg    DULoxetine (CYMBALTA) 60 MG capsule Take 90 mg by mouth once daily.    gabapentin (NEURONTIN) 300 MG capsule Take 1 capsule (300 mg total) by mouth 3 (three) times daily.    gabapentin (NEURONTIN) 600 MG tablet Take 1 tablet (600  mg total) by mouth 3 (three) times daily.    meloxicam (MOBIC) 15 MG tablet Take 1 tablet (15 mg total) by mouth daily as needed for Pain.    methylPREDNISolone (MEDROL DOSEPACK) 4 mg tablet use as directed    multivitamin capsule Take 1 capsule by mouth once daily.    mupirocin (BACTROBAN) 2 % ointment by Nasal route once daily. Apply to affected area 3 times daily    tadalafiL (CIALIS) 20 MG Tab Take 1 tablet (20 mg total) by mouth daily as needed.    valsartan (DIOVAN) 320 MG tablet Take 1 tablet (320 mg total) by mouth once daily.     No current facility-administered medications for this visit.       REVIEW OF SYSTEMS:    GENERAL:  No weight loss, malaise or fevers.  HEENT:  Negative for frequent or significant headaches.  NECK:  Negative for lumps, goiter, pain and significant neck swelling.  RESPIRATORY:  Negative for cough, wheezing or shortness of breath.  CARDIOVASCULAR:  Negative for chest pain, leg swelling or palpitations.  GI:  Negative for abdominal discomfort, blood in stools or black stools or change in bowel habits.  MUSCULOSKELETAL:  See HPI.  SKIN:  Negative for lesions, rash, and itching.  PSYCH:  Negative for sleep disturbance, mood disorder and recent psychosocial stressors.  HEMATOLOGY/LYMPHOLOGY:  Negative for prolonged bleeding, bruising easily or swollen nodes.  NEURO:   No history of headaches, syncope, paralysis, seizures or tremors.  All other reviewed and negative other than HPI.    OBJECTIVE:    There were no vitals taken for this visit.  There was no PE done for this visit    PHYSICAL EXAMINATION:  GEN: No acute distress. Calm, comfortable  HENT: Normocephalic, atraumatic, moist mucous membranes  EYE: Anicteric sclera, non-injected.   CV: Non-diaphoretic.   RESP: Breathing comfortably. Chest expansion symmetric.  PSYCH: Pleasant mood and appropriate affect. Recent and remote memory intact.     ASSESSMENT: 49 y.o. year old male with mainly right  low back pain and intermittently right  leg  pain, consistent with     1. Lumbar disc herniation        2. Lumbar radiculopathy        3. Herniated nucleus pulposus, lumbar        4. Chronic pain syndrome        5. Other spondylosis with radiculopathy, lumbar region              PLAN:   -none at this time discussed with patient that we will consider in the future a lumbar interlaminar ZENIA targeting L4-5 in effort to reduce his low back pain related to his herniated disc.    -consult Dr. Dimas on this case and recommend a lumbar ZENIA targeting L4-5  -recommended he continue gabapentin 600 mg t.i.d. taking 2 capsules at night and 1 in the morning.  -recommended Tylenol a 1000 mg t.i.d. not to exceed 3000 mg in 25.  -start tizanidine 4 mg q.h.s.  - I have stressed the importance of physical activity and a home exercise plan to help with pain and improve health.  - Referral to Physical therapy for Lumbar stabilization, core strengthening, and a home exercise program.  - Patient can continue with medications for now since they are providing benefits, using them appropriately, and without side effects.  - Counseled patient regarding the importance of activity modification, constant sleeping habits, and physical therapy.    The above plan and management options were discussed at length with patient. Patient is in agreement with the above and verbalized understanding. It will be communicated with the referring physician via electronic record, fax, or mail.    SERAFIN Romero  Interventional Pain Management    06/19/2024

## 2024-07-09 ENCOUNTER — OFFICE VISIT (OUTPATIENT)
Dept: NEUROSURGERY | Facility: CLINIC | Age: 49
End: 2024-07-09
Payer: COMMERCIAL

## 2024-07-09 ENCOUNTER — TELEPHONE (OUTPATIENT)
Dept: NEUROSURGERY | Facility: CLINIC | Age: 49
End: 2024-07-09

## 2024-07-09 VITALS
BODY MASS INDEX: 42.66 KG/M2 | HEIGHT: 72 IN | DIASTOLIC BLOOD PRESSURE: 83 MMHG | SYSTOLIC BLOOD PRESSURE: 148 MMHG | WEIGHT: 315 LBS | HEART RATE: 97 BPM

## 2024-07-09 DIAGNOSIS — G89.29 CHRONIC RIGHT-SIDED LOW BACK PAIN WITHOUT SCIATICA: Primary | ICD-10-CM

## 2024-07-09 DIAGNOSIS — M51.26 HNP (HERNIATED NUCLEUS PULPOSUS), LUMBAR: ICD-10-CM

## 2024-07-09 DIAGNOSIS — M47.26 OTHER SPONDYLOSIS WITH RADICULOPATHY, LUMBAR REGION: ICD-10-CM

## 2024-07-09 DIAGNOSIS — M51.36 DDD (DEGENERATIVE DISC DISEASE), LUMBAR: ICD-10-CM

## 2024-07-09 DIAGNOSIS — M54.16 LUMBAR RADICULOPATHY: ICD-10-CM

## 2024-07-09 DIAGNOSIS — M54.50 CHRONIC RIGHT-SIDED LOW BACK PAIN WITHOUT SCIATICA: Primary | ICD-10-CM

## 2024-07-09 PROCEDURE — 4010F ACE/ARB THERAPY RXD/TAKEN: CPT | Mod: CPTII,S$GLB,, | Performed by: PHYSICIAN ASSISTANT

## 2024-07-09 PROCEDURE — 3008F BODY MASS INDEX DOCD: CPT | Mod: CPTII,S$GLB,, | Performed by: PHYSICIAN ASSISTANT

## 2024-07-09 PROCEDURE — 1160F RVW MEDS BY RX/DR IN RCRD: CPT | Mod: CPTII,S$GLB,, | Performed by: PHYSICIAN ASSISTANT

## 2024-07-09 PROCEDURE — 3079F DIAST BP 80-89 MM HG: CPT | Mod: CPTII,S$GLB,, | Performed by: PHYSICIAN ASSISTANT

## 2024-07-09 PROCEDURE — 3044F HG A1C LEVEL LT 7.0%: CPT | Mod: CPTII,S$GLB,, | Performed by: PHYSICIAN ASSISTANT

## 2024-07-09 PROCEDURE — 1159F MED LIST DOCD IN RCRD: CPT | Mod: CPTII,S$GLB,, | Performed by: PHYSICIAN ASSISTANT

## 2024-07-09 PROCEDURE — 99214 OFFICE O/P EST MOD 30 MIN: CPT | Mod: S$GLB,,, | Performed by: PHYSICIAN ASSISTANT

## 2024-07-09 PROCEDURE — 99999 PR PBB SHADOW E&M-EST. PATIENT-LVL IV: CPT | Mod: PBBFAC,,, | Performed by: PHYSICIAN ASSISTANT

## 2024-07-09 PROCEDURE — 3077F SYST BP >= 140 MM HG: CPT | Mod: CPTII,S$GLB,, | Performed by: PHYSICIAN ASSISTANT

## 2024-07-09 NOTE — H&P (VIEW-ONLY)
Subjective:     Patient ID:  Sarkis Saez Jr. is a 49 y.o. male.    Jamie    Chief Complaint:  Back pain and right leg pain    Interval History:   07/09/2024    Patient had the right L4 transforaminal epidural steroid injection with Dr. Dimas.  He got 80% for 2 to 3 days.  Over the next 1-1.5 weeks he started having increased pain in the right low back buttock and hip region.  The leg is still better.  The pain is worse when lying flat on his back or when going from sitting to standing or lying to sitting.  He has more severe pain when he coughs.  He was trying Tylenol 3000 mg 3 times a day and Zanaflex at night without relief.  He is still taking gabapentin 600 mg 3 times a day.      Interval History:       Patient continues to have pain in the right low back with radiation down the right lateral leg to the ankle.  Worse with activity bending walking standing and better with sitting.  He tried diclofenac and Mobic and Medrol pack without relief.  He is taking gabapentin 3 times a day 300 mg without relief.  He denies any left leg pain.      HPI       Sarkis Saez Jr. is a 49 y.o. male who presents with the above CC.  I had seen this patient in 2019 for similar symptoms.  Eventually the pain went away.  He took diclofenac and gabapentin at that time.  Patient states in August 2023 he started having pain in the right low back right buttock and right lateral lower leg.  It has gotten better since then but still happens especially in certain positions that he is in.  He describes the pain as burning sensation in the lower leg.  It tends to be worse in the morning and when putting pressure on the right side and better with standing.  Denies any left leg pain or paresthesias.  No weakness in the legs.    Patient has had PT in the past.  No ESIs.  No spine surgery.  Patient is currently taking ibuprofen PRN.    Patient denies any recent accidents or trauma, no saddle anesthesias, and no bowel or bladder  incontinence.      Review of Systems:    Review of Systems   Constitutional:  Negative for chills, diaphoresis, fever, malaise/fatigue and weight loss.   HENT:  Positive for sinus pain. Negative for congestion, ear discharge, ear pain, hearing loss, nosebleeds, sore throat and tinnitus.    Eyes:  Negative for blurred vision, double vision, photophobia, pain, discharge and redness.   Respiratory:  Positive for cough. Negative for hemoptysis, sputum production, shortness of breath, wheezing and stridor.    Cardiovascular:  Negative for chest pain, palpitations, orthopnea, leg swelling and PND.   Gastrointestinal:  Negative for abdominal pain, blood in stool, constipation, diarrhea, heartburn, melena, nausea and vomiting.   Genitourinary:  Negative for dysuria, flank pain, frequency, hematuria and urgency.   Musculoskeletal:  Positive for back pain. Negative for falls, joint pain, myalgias and neck pain.   Skin:  Negative for itching and rash.   Neurological:  Negative for dizziness, tingling, tremors, sensory change, speech change, seizures, loss of consciousness, weakness and headaches.   Endo/Heme/Allergies:  Negative for environmental allergies and polydipsia. Does not bruise/bleed easily.   Psychiatric/Behavioral:  Positive for depression. Negative for hallucinations, memory loss and substance abuse. The patient is nervous/anxious. The patient does not have insomnia.          Past Medical History:   Diagnosis Date    Anxiety     Hypertension     Obesity     Sleep apnea      Past Surgical History:   Procedure Laterality Date    INJECTION, SPINE, LUMBOSACRAL, TRANSFORAMINAL APPROACH Right 6/3/2024    Procedure: TFESI RT L4/L5;  Surgeon: Mine Dimas DO;  Location: [facility];  Service: Pain Management;  Laterality: Right;  20mins-ASA 5d     Current Outpatient Medications on File Prior to Visit   Medication Sig Dispense Refill    amLODIPine (NORVASC) 10 MG tablet Take 1 tablet (10 mg total) by mouth  once daily. 90 tablet 3    aspirin (ECOTRIN) 81 MG EC tablet Take 81 mg by mouth once daily.      chlorhexidine (HIBICLENS) 4 % external liquid Apply topically daily as needed (staph decolonization). 946 mL 11    clonazePAM (KLONOPIN) 0.5 MG tablet Take 1 tablet (0.5 mg total) by mouth 2 (two) times a day. 60 tablet 5    diclofenac (VOLTAREN) 75 MG EC tablet Take 1 tablet (75 mg total) by mouth 2 (two) times daily as needed (pain). 60 tablet 2    DULoxetine (CYMBALTA) 30 MG capsule Take 30 mg by mouth once daily. Plus 60 mg for total daily 90 mg      DULoxetine (CYMBALTA) 60 MG capsule Take 90 mg by mouth once daily.  1    gabapentin (NEURONTIN) 600 MG tablet Take 1 tablet (600 mg total) by mouth 3 (three) times daily. 270 tablet 0    multivitamin capsule Take 1 capsule by mouth once daily.      tadalafiL (CIALIS) 20 MG Tab Take 1 tablet (20 mg total) by mouth daily as needed. 30 tablet 0    tiZANidine (ZANAFLEX) 4 MG tablet Take 1 tablet (4 mg total) by mouth nightly as needed. 30 tablet 0    valsartan (DIOVAN) 320 MG tablet Take 1 tablet (320 mg total) by mouth once daily. 90 tablet 3    gabapentin (NEURONTIN) 300 MG capsule Take 1 capsule (300 mg total) by mouth 3 (three) times daily. 72 capsule 2    meloxicam (MOBIC) 15 MG tablet Take 1 tablet (15 mg total) by mouth daily as needed for Pain. (Patient not taking: Reported on 2024) 90 tablet 0    methylPREDNISolone (MEDROL DOSEPACK) 4 mg tablet use as directed 1 each 0    mupirocin (BACTROBAN) 2 % ointment by Nasal route once daily. Apply to affected area 3 times daily (Patient not taking: Reported on 2024) 22 g 11     No current facility-administered medications on file prior to visit.     Review of patient's allergies indicates:   Allergen Reactions    Doxycycline Hives     Social History     Socioeconomic History    Marital status:    Tobacco Use    Smoking status: Former     Types: Cigars     Quit date: 2014     Years since quittin.5     Smokeless tobacco: Never   Substance and Sexual Activity    Alcohol use: Yes   Social History Narrative    Pt is  at [employer].      Social Determinants of Health     Financial Resource Strain: Medium Risk (1/31/2024)    Overall Financial Resource Strain (CARDIA)     Difficulty of Paying Living Expenses: Somewhat hard   Food Insecurity: No Food Insecurity (1/31/2024)    Hunger Vital Sign     Worried About Running Out of Food in the Last Year: Never true     Ran Out of Food in the Last Year: Never true   Transportation Needs: No Transportation Needs (1/31/2024)    PRAPARE - Transportation     Lack of Transportation (Medical): No     Lack of Transportation (Non-Medical): No   Physical Activity: Insufficiently Active (1/31/2024)    Exercise Vital Sign     Days of Exercise per Week: 2 days     Minutes of Exercise per Session: 20 min   Stress: No Stress Concern Present (1/31/2024)    [employer] - Occupational Stress Questionnaire     Feeling of Stress : Only a little   Housing Stability: Low Risk  (1/31/2024)    Housing Stability Vital Sign     Unable to Pay for Housing in the Last Year: No     Number of Places Lived in the Last Year: 1     Unstable Housing in the Last Year: No     Family History   Problem Relation Name Age of Onset    Diabetes Father      Hypertension Father      Heart disease Maternal Grandfather      Heart disease Paternal Grandfather         Objective:      Vitals:    07/09/24 0913   BP: (!) 148/83   Pulse: 97   Weight: (!) 158.8 kg (350 lb 1.5 oz)   Height: 6' (1.829 m)   PainSc:   8   PainLoc: Back         Physical Exam:      General:  Sarkis Saez JrKristian is well-developed, well-nourished, appears stated age, in no acute distress, alert and oriented to person, place, and time.    Pulmonary/Chest:  Respiratory effort normal  Abdominal: Exhibits no distension  Psychiatric:  Normal mood and affect.  Behavior is normal.  Judgement and thought  content normal      Musculoskeletal:    Lumbar ROM:   Pain in lumbar flexion and right lateral bending. No pain in extension and left lateral bending.    Lumbar Spine Inspection:  Normal with no surgical scars and no visible rashes.    Lumbar Spine Palpation:  No tenderness to low back palpation.    SI Joint Palpation:  No tenderness to SI Joint palpation.      Neurological:     Muscle strength against resistance:     Right Left   Hip flexion  5 / 5 5 / 5   Hip extension 5 / 5 5 / 5   Hip abduction 5 / 5 5 / 5   Hip adduction  5 / 5 5 / 5   Knee extension  5 / 5 5 / 5   Knee flexion 5 / 5 5 / 5   Dorsiflexion  5 / 5 5 / 5   EHL  5 / 5 5 / 5   Plantar flexion  5 / 5 5 / 5   Inversion of the feet 5 / 5 5 / 5   Eversion of the feet  5 / 5 5 / 5       On gross examination of the bilateral upper extremities, patient has full painfree ROM with no signs of clubbing, cyanosis, edema, or weakness.       XRAY/MRI Interpretation:     Lumbar spine xrays were personally reviewed today.  No fractures.  No movement on flexion and extension.  L5-S1DDD.    Lumbar spine MRI was personally reviewed today from 2024.  Degenerative disc at L5-S1. Central to right paracentral L4-5 disc herniation.      Assessment:          1. Chronic right-sided low back pain without sciatica    2. Other spondylosis with radiculopathy, lumbar region    3. DDD (degenerative disc disease), lumbar    4. Lumbar radiculopathy    5. HNP (herniated nucleus pulposus), lumbar              Plan:               Degenerative disc at L5-S1. Central to right paracentral L4-5 disc herniation.    -Increase gabapentin to 600 mg morning and afternoon and 1200mg at night  -will discuss with Dr. Dimas what she recommends next for another injection    Follow-Up:  Follow up if symptoms worsen or fail to improve. If there are any questions prior to this, the patient was instructed to contact the office.       Yamilet Abdi, [facility], PA-C  Neurosurgery  [facility]  [place]  07/09/2024

## 2024-07-09 NOTE — TELEPHONE ENCOUNTER
Dr. Dimas,    He got 80% relief of the back and right leg pain for 2-3 days.  The leg pain is still better but more of the pain is in the right low back and hip region.     He said Lexa was going to review with you what injection to do next.    What do you recommend?  Another right L4 TF ZENIA or L4-5 IL?    Thanks,  Yamilet Abdi, [facility], PA-C  Neurosurgery  [facility]  07/09/2024

## 2024-07-09 NOTE — PROGRESS NOTES
Subjective:     Patient ID:  Sarkis Saez Jr. is a 49 y.o. male.    Jamie    Chief Complaint:  Back pain and right leg pain    Interval History:   07/09/2024    Patient had the right L4 transforaminal epidural steroid injection with Dr. Dimas.  He got 80% for 2 to 3 days.  Over the next 1-1.5 weeks he started having increased pain in the right low back buttock and hip region.  The leg is still better.  The pain is worse when lying flat on his back or when going from sitting to standing or lying to sitting.  He has more severe pain when he coughs.  He was trying Tylenol 3000 mg 3 times a day and Zanaflex at night without relief.  He is still taking gabapentin 600 mg 3 times a day.      Interval History:       Patient continues to have pain in the right low back with radiation down the right lateral leg to the ankle.  Worse with activity bending walking standing and better with sitting.  He tried diclofenac and Mobic and Medrol pack without relief.  He is taking gabapentin 3 times a day 300 mg without relief.  He denies any left leg pain.      HPI       Sarkis Saez Jr. is a 49 y.o. male who presents with the above CC.  I had seen this patient in 2019 for similar symptoms.  Eventually the pain went away.  He took diclofenac and gabapentin at that time.  Patient states in August 2023 he started having pain in the right low back right buttock and right lateral lower leg.  It has gotten better since then but still happens especially in certain positions that he is in.  He describes the pain as burning sensation in the lower leg.  It tends to be worse in the morning and when putting pressure on the right side and better with standing.  Denies any left leg pain or paresthesias.  No weakness in the legs.    Patient has had PT in the past.  No ESIs.  No spine surgery.  Patient is currently taking ibuprofen PRN.    Patient denies any recent accidents or trauma, no saddle anesthesias, and no bowel or bladder  incontinence.      Review of Systems:    Review of Systems   Constitutional:  Negative for chills, diaphoresis, fever, malaise/fatigue and weight loss.   HENT:  Positive for sinus pain. Negative for congestion, ear discharge, ear pain, hearing loss, nosebleeds, sore throat and tinnitus.    Eyes:  Negative for blurred vision, double vision, photophobia, pain, discharge and redness.   Respiratory:  Positive for cough. Negative for hemoptysis, sputum production, shortness of breath, wheezing and stridor.    Cardiovascular:  Negative for chest pain, palpitations, orthopnea, leg swelling and PND.   Gastrointestinal:  Negative for abdominal pain, blood in stool, constipation, diarrhea, heartburn, melena, nausea and vomiting.   Genitourinary:  Negative for dysuria, flank pain, frequency, hematuria and urgency.   Musculoskeletal:  Positive for back pain. Negative for falls, joint pain, myalgias and neck pain.   Skin:  Negative for itching and rash.   Neurological:  Negative for dizziness, tingling, tremors, sensory change, speech change, seizures, loss of consciousness, weakness and headaches.   Endo/Heme/Allergies:  Negative for environmental allergies and polydipsia. Does not bruise/bleed easily.   Psychiatric/Behavioral:  Positive for depression. Negative for hallucinations, memory loss and substance abuse. The patient is nervous/anxious. The patient does not have insomnia.          Past Medical History:   Diagnosis Date    Anxiety     Hypertension     Obesity     Sleep apnea      Past Surgical History:   Procedure Laterality Date    INJECTION, SPINE, LUMBOSACRAL, TRANSFORAMINAL APPROACH Right 6/3/2024    Procedure: TFESI RT L4/L5;  Surgeon: Mine Dimas DO;  Location: [facility];  Service: Pain Management;  Laterality: Right;  20mins-ASA 5d     Current Outpatient Medications on File Prior to Visit   Medication Sig Dispense Refill    amLODIPine (NORVASC) 10 MG tablet Take 1 tablet (10 mg total) by mouth  once daily. 90 tablet 3    aspirin (ECOTRIN) 81 MG EC tablet Take 81 mg by mouth once daily.      chlorhexidine (HIBICLENS) 4 % external liquid Apply topically daily as needed (staph decolonization). 946 mL 11    clonazePAM (KLONOPIN) 0.5 MG tablet Take 1 tablet (0.5 mg total) by mouth 2 (two) times a day. 60 tablet 5    diclofenac (VOLTAREN) 75 MG EC tablet Take 1 tablet (75 mg total) by mouth 2 (two) times daily as needed (pain). 60 tablet 2    DULoxetine (CYMBALTA) 30 MG capsule Take 30 mg by mouth once daily. Plus 60 mg for total daily 90 mg      DULoxetine (CYMBALTA) 60 MG capsule Take 90 mg by mouth once daily.  1    gabapentin (NEURONTIN) 600 MG tablet Take 1 tablet (600 mg total) by mouth 3 (three) times daily. 270 tablet 0    multivitamin capsule Take 1 capsule by mouth once daily.      tadalafiL (CIALIS) 20 MG Tab Take 1 tablet (20 mg total) by mouth daily as needed. 30 tablet 0    tiZANidine (ZANAFLEX) 4 MG tablet Take 1 tablet (4 mg total) by mouth nightly as needed. 30 tablet 0    valsartan (DIOVAN) 320 MG tablet Take 1 tablet (320 mg total) by mouth once daily. 90 tablet 3    gabapentin (NEURONTIN) 300 MG capsule Take 1 capsule (300 mg total) by mouth 3 (three) times daily. 72 capsule 2    meloxicam (MOBIC) 15 MG tablet Take 1 tablet (15 mg total) by mouth daily as needed for Pain. (Patient not taking: Reported on 2024) 90 tablet 0    methylPREDNISolone (MEDROL DOSEPACK) 4 mg tablet use as directed 1 each 0    mupirocin (BACTROBAN) 2 % ointment by Nasal route once daily. Apply to affected area 3 times daily (Patient not taking: Reported on 2024) 22 g 11     No current facility-administered medications on file prior to visit.     Review of patient's allergies indicates:   Allergen Reactions    Doxycycline Hives     Social History     Socioeconomic History    Marital status:    Tobacco Use    Smoking status: Former     Types: Cigars     Quit date: 2014     Years since quittin.5     Smokeless tobacco: Never   Substance and Sexual Activity    Alcohol use: Yes   Social History Narrative    Pt is  at [employer].      Social Determinants of Health     Financial Resource Strain: Medium Risk (1/31/2024)    Overall Financial Resource Strain (CARDIA)     Difficulty of Paying Living Expenses: Somewhat hard   Food Insecurity: No Food Insecurity (1/31/2024)    Hunger Vital Sign     Worried About Running Out of Food in the Last Year: Never true     Ran Out of Food in the Last Year: Never true   Transportation Needs: No Transportation Needs (1/31/2024)    PRAPARE - Transportation     Lack of Transportation (Medical): No     Lack of Transportation (Non-Medical): No   Physical Activity: Insufficiently Active (1/31/2024)    Exercise Vital Sign     Days of Exercise per Week: 2 days     Minutes of Exercise per Session: 20 min   Stress: No Stress Concern Present (1/31/2024)    [employer] - Occupational Stress Questionnaire     Feeling of Stress : Only a little   Housing Stability: Low Risk  (1/31/2024)    Housing Stability Vital Sign     Unable to Pay for Housing in the Last Year: No     Number of Places Lived in the Last Year: 1     Unstable Housing in the Last Year: No     Family History   Problem Relation Name Age of Onset    Diabetes Father      Hypertension Father      Heart disease Maternal Grandfather      Heart disease Paternal Grandfather         Objective:      Vitals:    07/09/24 0913   BP: (!) 148/83   Pulse: 97   Weight: (!) 158.8 kg (350 lb 1.5 oz)   Height: 6' (1.829 m)   PainSc:   8   PainLoc: Back         Physical Exam:      General:  Sarkis Saez JrKristian is well-developed, well-nourished, appears stated age, in no acute distress, alert and oriented to person, place, and time.    Pulmonary/Chest:  Respiratory effort normal  Abdominal: Exhibits no distension  Psychiatric:  Normal mood and affect.  Behavior is normal.  Judgement and thought  content normal      Musculoskeletal:    Lumbar ROM:   Pain in lumbar flexion and right lateral bending. No pain in extension and left lateral bending.    Lumbar Spine Inspection:  Normal with no surgical scars and no visible rashes.    Lumbar Spine Palpation:  No tenderness to low back palpation.    SI Joint Palpation:  No tenderness to SI Joint palpation.      Neurological:     Muscle strength against resistance:     Right Left   Hip flexion  5 / 5 5 / 5   Hip extension 5 / 5 5 / 5   Hip abduction 5 / 5 5 / 5   Hip adduction  5 / 5 5 / 5   Knee extension  5 / 5 5 / 5   Knee flexion 5 / 5 5 / 5   Dorsiflexion  5 / 5 5 / 5   EHL  5 / 5 5 / 5   Plantar flexion  5 / 5 5 / 5   Inversion of the feet 5 / 5 5 / 5   Eversion of the feet  5 / 5 5 / 5       On gross examination of the bilateral upper extremities, patient has full painfree ROM with no signs of clubbing, cyanosis, edema, or weakness.       XRAY/MRI Interpretation:     Lumbar spine xrays were personally reviewed today.  No fractures.  No movement on flexion and extension.  L5-S1DDD.    Lumbar spine MRI was personally reviewed today from 2024.  Degenerative disc at L5-S1. Central to right paracentral L4-5 disc herniation.      Assessment:          1. Chronic right-sided low back pain without sciatica    2. Other spondylosis with radiculopathy, lumbar region    3. DDD (degenerative disc disease), lumbar    4. Lumbar radiculopathy    5. HNP (herniated nucleus pulposus), lumbar              Plan:               Degenerative disc at L5-S1. Central to right paracentral L4-5 disc herniation.    -Increase gabapentin to 600 mg morning and afternoon and 1200mg at night  -will discuss with Dr. Dimas what she recommends next for another injection    Follow-Up:  Follow up if symptoms worsen or fail to improve. If there are any questions prior to this, the patient was instructed to contact the office.       Yamilet Abdi, [facility], PA-C  Neurosurgery  [facility]  [place]  07/09/2024

## 2024-07-10 ENCOUNTER — TELEPHONE (OUTPATIENT)
Dept: PAIN MEDICINE | Facility: CLINIC | Age: 49
End: 2024-07-10
Payer: COMMERCIAL

## 2024-07-10 DIAGNOSIS — M51.26 LUMBAR DISC HERNIATION: Primary | ICD-10-CM

## 2024-07-10 DIAGNOSIS — M51.26 HERNIATED NUCLEUS PULPOSUS, LUMBAR: ICD-10-CM

## 2024-07-10 DIAGNOSIS — M54.16 LUMBAR RADICULOPATHY: ICD-10-CM

## 2024-07-10 NOTE — TELEPHONE ENCOUNTER
----- Message from Myron Dimas DO sent at 7/10/2024  2:49 PM CDT -----  Regarding: Order for HODADIANA MARY URENAKristian    Patient Name: DIANA DRUMMOND JR.([id number])  Sex: Male  : 1975      PCP: KULDIP BARCENAS    Center: [facility]     Types of orders made on 07/10/2024: Procedure Request    Order Date:7/10/2024  Ordering User:MYRON DIMAS [[id number]]  Encounter Provider:Myron Dimas DO [[id number]]    Z1 Authorizing Provider: Myron Dimas DO [[id number]]  Department:[facility] PAIN MANAGEMENT[[id number]]    Common Order Information  Procedure -> Epidural Injection (specify level) Cmt: Interlaminar L4/L5    Pre-op Diagnosis -> DDD (degenerative disc disease), lumbar       -> Lumbar radiculopathy     Order Specific Information  Order: Procedure Order to Pain Management [Custom: [id number]]  Order #:          [id number]: 1 FUTURE      Priority: Routine  Class: Clinic Performed    Future Order Information      Expires on:07/10/2025            Expected by:07/10/2024                   Associated Diagnoses      M51.26 Lumbar disc herniation      M54.16 Lumbar radiculopathy      M51.26 Herniated nucleus pulposus, lumbar      Physician -> Gelter         Facility Name: -> [facility]           Priority: Routine  Class: Clinic Performed      Future Order Information      Expires on:07/10/2025            Expected by:07/10/2024                   Associated Diagnoses      M51.26 Lumbar disc herniation      M54.16 Lumbar radiculopathy      M51.26 Herniated nucleus pulposus, lumbar      Procedure -> Epidural Injection (specify level) Cmt: Interlaminar L4/L5        Physician -> Gelter         Pre-op Diagnosis -> DDD (degenerative disc disease), lumbar           -> Sally  mbar radiculopathy         Facility Name: -> [facility]          None

## 2024-07-18 NOTE — PRE-PROCEDURE INSTRUCTIONS
Pre procedure instructions and arrival time given to patient. Patient confirmed instructions and states he will have a  when the procedure is finished.    Dear Sarkis     You are scheduled for your procedure on 7/22/24 with Dr. Dimas, please report to [facility] [address].     Please park in the lot in front of the building and enter at the main entrance. Proceed to the second floor for registration.     Arrival time: 9:40 am     Anesthesia fasting instructions:   Oral Sedation. You do not need to fast before this procedure.  You can eat and drink like normal.  You CANNOT drive yourself and must have a .     *Please ensure that you have planned your ride home.   *You may not leave alone in an uber, taxi, etc., If you have received oral and iv sedation.  *You must be discharged to a responsible adult.   *Please remain under adult supervision for 24 hours.     If possible, please have your ride stay in or near the facility during your procedure.     You should take any medications that you routinely take for blood pressure, heart medications, thyroid, cholesterol, etc with small sips of water.   If you are a diabetic, do not take your medication on the morning of your procedure. Please bring medication with you to the facility.   Please plan on being here roughly 2 hours.   HOLD vitamins and supplements the morning of your procedure.  HOLD any Anticoagulants (ex. Aspirin, goody or BC powder, Coumadin, Eliquis, Heparin, Plavix, Lovenox, Xarelto, etc.) for a total of 5 days  HOLD any GLP-1 injections for 7 Days Prior to procedure (Ozempic, Mounjaro, Trulicity, Victoza, Byetta, Wegovy, Adlyxin, etc)      Shower the night before and the morning of your procedure with antibacterial soap (example: Dial)      Do not apply any products to your skin or hair after showering such as; lotions, oils, ointments, creams, powders, lotion, deodorant, make-up, perfumes, etc.         No contacts, please bring glasses.  If you have dentures, please bring a case.  Please leave all jewelry and valuables at home.  Wear loose comfortable clothes. All patients will be placed in a gown.        -If you start to feel sick (fever, chills, coughing, sore throat, etc.) or begin taking any new antibiotics, please contact your doctor's office at [phone] prior to coming to the facility.      Please reply to this message as receipt of delivery.     Best Wishes,  [facility]  Pre Admit testing

## 2024-07-22 ENCOUNTER — HOSPITAL ENCOUNTER (OUTPATIENT)
Facility: HOSPITAL | Age: 49
Discharge: HOME | End: 2024-07-22
Attending: STUDENT IN AN ORGANIZED HEALTH CARE EDUCATION/TRAINING PROGRAM | Admitting: STUDENT IN AN ORGANIZED HEALTH CARE EDUCATION/TRAINING PROGRAM
Payer: COMMERCIAL

## 2024-07-22 VITALS
DIASTOLIC BLOOD PRESSURE: 65 MMHG | TEMPERATURE: 98 F | WEIGHT: 315 LBS | BODY MASS INDEX: 42.66 KG/M2 | SYSTOLIC BLOOD PRESSURE: 140 MMHG | HEIGHT: 72 IN | RESPIRATION RATE: 19 BRPM | OXYGEN SATURATION: 94 % | HEART RATE: 75 BPM

## 2024-07-22 DIAGNOSIS — M51.36 DDD (DEGENERATIVE DISC DISEASE), LUMBAR: ICD-10-CM

## 2024-07-22 DIAGNOSIS — M54.16 LUMBAR RADICULOPATHY: ICD-10-CM

## 2024-07-22 DIAGNOSIS — M51.26 LUMBAR DISC HERNIATION: Primary | ICD-10-CM

## 2024-07-22 DIAGNOSIS — G89.29 CHRONIC PAIN: ICD-10-CM

## 2024-07-22 PROCEDURE — 62323 NJX INTERLAMINAR LMBR/SAC: CPT | Performed by: STUDENT IN AN ORGANIZED HEALTH CARE EDUCATION/TRAINING PROGRAM

## 2024-07-22 PROCEDURE — 25000003 PHARM REV CODE 250: Performed by: STUDENT IN AN ORGANIZED HEALTH CARE EDUCATION/TRAINING PROGRAM

## 2024-07-22 PROCEDURE — 63600175 PHARM REV CODE 636 W HCPCS: Performed by: STUDENT IN AN ORGANIZED HEALTH CARE EDUCATION/TRAINING PROGRAM

## 2024-07-22 PROCEDURE — 25500020 PHARM REV CODE 255: Performed by: STUDENT IN AN ORGANIZED HEALTH CARE EDUCATION/TRAINING PROGRAM

## 2024-07-22 PROCEDURE — 62323 NJX INTERLAMINAR LMBR/SAC: CPT | Mod: ,,, | Performed by: STUDENT IN AN ORGANIZED HEALTH CARE EDUCATION/TRAINING PROGRAM

## 2024-07-22 RX ORDER — DEXAMETHASONE SODIUM PHOSPHATE 10 MG/ML
INJECTION INTRAMUSCULAR; INTRAVENOUS
Status: DISCONTINUED | OUTPATIENT
Start: 2024-07-22 | End: 2024-07-22 | Stop reason: HOSPADM

## 2024-07-22 RX ORDER — MIDAZOLAM HYDROCHLORIDE 1 MG/ML
INJECTION INTRAMUSCULAR; INTRAVENOUS
Status: DISCONTINUED | OUTPATIENT
Start: 2024-07-22 | End: 2024-07-22 | Stop reason: HOSPADM

## 2024-07-22 RX ORDER — ALPRAZOLAM 0.5 MG/1
0.5 TABLET, ORALLY DISINTEGRATING ORAL
Status: DISCONTINUED | OUTPATIENT
Start: 2024-07-22 | End: 2024-07-22 | Stop reason: HOSPADM

## 2024-07-22 RX ORDER — LIDOCAINE HYDROCHLORIDE 20 MG/ML
INJECTION, SOLUTION EPIDURAL; INFILTRATION; INTRACAUDAL; PERINEURAL
Status: DISCONTINUED | OUTPATIENT
Start: 2024-07-22 | End: 2024-07-22 | Stop reason: HOSPADM

## 2024-07-22 RX ORDER — FENTANYL CITRATE 50 UG/ML
INJECTION, SOLUTION INTRAMUSCULAR; INTRAVENOUS
Status: DISCONTINUED | OUTPATIENT
Start: 2024-07-22 | End: 2024-07-22 | Stop reason: HOSPADM

## 2024-07-22 NOTE — OP NOTE
Lumbar Interlaminar Epidural Steroid Injection under Fluoroscopic Guidance    The procedure, risks, benefits, and options were discussed with the patient. There are no contraindications to the procedure. The patent expressed understanding and agreed to the procedure. Informed written consent was obtained prior to the start of the procedure and can be found in the patient's chart.    PATIENT NAME: Sarkis Saez Jr.   MRN: [id number]     DATE OF PROCEDURE: 07/22/2024    PROCEDURE: Lumbar Interlaminar Epidural Steroid Injection L4/L5 under Fluoroscopic Guidance    PRE-OP DIAGNOSIS: Lumbar disc herniation [M51.26]  Lumbar radiculopathy [M54.16]  Herniated nucleus pulposus, lumbar [M51.26] Lumbar radiculopathy [M54.16]    POST-OP DIAGNOSIS: Same    PHYSICIAN: Mine Dimas DO    ASSISTANTS: None     MEDICATIONS INJECTED: Preservative-free Decadron 10mg with 4cc of Lidocaine 1% MPF and preservative free normal saline    LOCAL ANESTHETIC INJECTED: Xylocaine 2%     SEDATION: Versed 2mg and Fentanyl 50mcg                                                                                                                                                                                     Conscious sedation ordered by M.D. Patient re-evaluation prior to administration of conscious sedation. No changes noted in patient's status from initial evaluation. The patient's vital signs were monitored by RN and patient remained hemodynamically stable throughout the procedure.    Event Time In   Sedation Start 1127   Sedation End 1132       ESTIMATED BLOOD LOSS: None    COMPLICATIONS: None    TECHNIQUE: Time-out was performed to identify the patient and procedure to be performed. With the patient laying in a prone position, the surgical area was prepped and draped in the usual sterile fashion using ChloraPrep and a fenestrated drape. The level was determined under fluoroscopy guidance. Skin anesthesia was achieved by injecting Lidocaine  2% over the injection site. The interlaminar space was then approached with a 20 gauge,  5 inch Tuohy needle that was introduced under fluoroscopic guidance in the AP, lateral and/or contralateral oblique imaging. Once the Ligamentum flavum was encountered loss of resistance to saline was used to enter the epidural space. With positive loss of resistance and negative aspiration for CSF or Blood, contrast dye Omnipaque (300mg/mL) was injected to confirm placement and there was no vascular runoff. 5 mL of the medication mixture listed above was injected slowly. Displacement of the radio opaque contrast after injection of the medication confirmed that the medication went into the area of the epidural space. The needles were removed and bleeding was nil. A sterile dressing was applied. No specimens collected. The patient tolerated the procedure well.       The patient was monitored after the procedure in the recovery area. They were given post-procedure and discharge instructions to follow at home. The patient was discharged in a stable condition.      Mine Dimas DO

## 2024-07-22 NOTE — PLAN OF CARE
Pre-op charting checklist complete. Patient vital signs stable, calm and cooperative. Patient educated on unit policies and procedures, patient verbalized understanding.

## 2024-07-22 NOTE — DISCHARGE SUMMARY
Discharge Note  Short Stay      SUMMARY     Admit Date: 7/22/2024    Attending Physician: Mine Dimas      Discharge Physician: Mine Dimas      Discharge Date: 7/22/2024 11:34 AM    Procedure(s) (LRB):  Interlaminar ZENIA L4/L5 (N/A)    Final Diagnosis: Lumbar disc herniation [M51.26]  Lumbar radiculopathy [M54.16]  Herniated nucleus pulposus, lumbar [M51.26]    Disposition: Home or self care    Patient Instructions:   Current Discharge Medication List        CONTINUE these medications which have NOT CHANGED    Details   amLODIPine (NORVASC) 10 MG tablet Take 1 tablet (10 mg total) by mouth once daily.  Qty: 90 tablet, Refills: 3    Comments: .  Associated Diagnoses: Hypertension, essential      aspirin (ECOTRIN) 81 MG EC tablet Take 81 mg by mouth once daily.      clonazePAM (KLONOPIN) 0.5 MG tablet Take 1 tablet (0.5 mg total) by mouth 2 (two) times a day.  Qty: 60 tablet, Refills: 5    Comments: approved per samuel owusu      !! DULoxetine (CYMBALTA) 30 MG capsule Take 30 mg by mouth once daily. Plus 60 mg for total daily 90 mg      !! DULoxetine (CYMBALTA) 60 MG capsule Take 90 mg by mouth once daily.  Refills: 1      gabapentin (NEURONTIN) 600 MG tablet Take 1 tablet (600 mg total) by mouth 3 (three) times daily.  Qty: 270 tablet, Refills: 0      multivitamin capsule Take 1 capsule by mouth once daily.      tadalafiL (CIALIS) 20 MG Tab Take 1 tablet (20 mg total) by mouth daily as needed.  Qty: 30 tablet, Refills: 0    Associated Diagnoses: Erectile dysfunction, unspecified erectile dysfunction type      valsartan (DIOVAN) 320 MG tablet Take 1 tablet (320 mg total) by mouth once daily.  Qty: 90 tablet, Refills: 3    Comments: .  Associated Diagnoses: Hypertension, essential      chlorhexidine (HIBICLENS) 4 % external liquid Apply topically daily as needed (staph decolonization).  Qty: 946 mL, Refills: 11    Associated Diagnoses: Skin infection      methylPREDNISolone (MEDROL DOSEPACK) 4 mg tablet use  as directed  Qty: 1 each, Refills: 0       !! - Potential duplicate medications found. Please discuss with provider.              Discharge Diagnosis: Lumbar disc herniation [M51.26]  Lumbar radiculopathy [M54.16]  Herniated nucleus pulposus, lumbar [M51.26]  Condition on Discharge: Stable with no complications to procedure   Diet on Discharge: Same as before.  Activity: as per instruction sheet.  Discharge to: Home with a responsible adult.  Follow up: 2-4 weeks       Please call my office or pager at [phone] if experienced any weakness or loss of sensation, fever > 101.5, pain uncontrolled with oral medications, persistent nausea/vomiting/or diarrhea, redness or drainage from the incisions, or any other worrisome concerns. If physician on call was not reached or could not communicate with our office for any reason please go to the nearest emergency department

## 2024-07-25 ENCOUNTER — OFFICE VISIT (OUTPATIENT)
Dept: UROLOGY | Facility: CLINIC | Age: 49
End: 2024-07-25
Payer: COMMERCIAL

## 2024-07-25 VITALS
WEIGHT: 315 LBS | DIASTOLIC BLOOD PRESSURE: 65 MMHG | HEIGHT: 72 IN | HEART RATE: 75 BPM | SYSTOLIC BLOOD PRESSURE: 141 MMHG | BODY MASS INDEX: 42.66 KG/M2

## 2024-07-25 DIAGNOSIS — R97.20 ELEVATED PSA: Primary | ICD-10-CM

## 2024-07-25 DIAGNOSIS — N52.9 ERECTILE DYSFUNCTION, UNSPECIFIED ERECTILE DYSFUNCTION TYPE: ICD-10-CM

## 2024-07-25 PROCEDURE — 3077F SYST BP >= 140 MM HG: CPT | Mod: CPTII,S$GLB,, | Performed by: UROLOGY

## 2024-07-25 PROCEDURE — 99999 PR PBB SHADOW E&M-EST. PATIENT-LVL III: CPT | Mod: PBBFAC,,, | Performed by: UROLOGY

## 2024-07-25 PROCEDURE — 3008F BODY MASS INDEX DOCD: CPT | Mod: CPTII,S$GLB,, | Performed by: UROLOGY

## 2024-07-25 PROCEDURE — 99203 OFFICE O/P NEW LOW 30 MIN: CPT | Mod: S$GLB,,, | Performed by: UROLOGY

## 2024-07-25 PROCEDURE — 3078F DIAST BP <80 MM HG: CPT | Mod: CPTII,S$GLB,, | Performed by: UROLOGY

## 2024-07-25 PROCEDURE — 1159F MED LIST DOCD IN RCRD: CPT | Mod: CPTII,S$GLB,, | Performed by: UROLOGY

## 2024-07-25 PROCEDURE — 4010F ACE/ARB THERAPY RXD/TAKEN: CPT | Mod: CPTII,S$GLB,, | Performed by: UROLOGY

## 2024-07-25 PROCEDURE — 3044F HG A1C LEVEL LT 7.0%: CPT | Mod: CPTII,S$GLB,, | Performed by: UROLOGY

## 2024-07-25 RX ORDER — TADALAFIL 20 MG/1
20 TABLET ORAL DAILY PRN
Qty: 30 TABLET | Refills: 11 | Status: SHIPPED | OUTPATIENT
Start: 2024-07-25

## 2024-07-25 NOTE — PROGRESS NOTES
Subjective:       Patient ID: Sarkis Saez Jr. is a 49 y.o. male.    Chief Complaint: Elevated PSA (Pt here for elevated psa and ED. Pt reports taking Cialis few hours before expecting intercourse. )    HPI patient is here for a slow rise in his PSA went from 1.1-1.5 he is concerned about prostate cancer he has a negative history he is voiding well he also takes Cialis but does not seem to be helping recommended that he take 1 pill every day.  He does not take nitroglycerin    Past Medical History:   Diagnosis Date    Anxiety     Hypertension     Obesity     Sleep apnea        Past Surgical History:   Procedure Laterality Date    EPIDURAL STEROID INJECTION INTO LUMBAR SPINE N/A 2024    Procedure: Interlaminar ZENIA L4/L5;  Surgeon: Mine Dimas DO;  Location: [facility];  Service: Pain Management;  Laterality: N/A;  Oral sed-20mins-ASA 5d    INJECTION, SPINE, LUMBOSACRAL, TRANSFORAMINAL APPROACH Right 6/3/2024    Procedure: TFESI RT L4/L5;  Surgeon: Mine Dimas DO;  Location: [facility];  Service: Pain Management;  Laterality: Right;  20mins-ASA 5d       Family History   Problem Relation Name Age of Onset    Diabetes Father      Hypertension Father      Heart disease Maternal Grandfather      Heart disease Paternal Grandfather         Social History     Socioeconomic History    Marital status:    Tobacco Use    Smoking status: Former     Types: Cigars     Quit date: 2014     Years since quittin.6    Smokeless tobacco: Never   Substance and Sexual Activity    Alcohol use: Yes   Social History Narrative    Pt is  at [employer].      Social Determinants of Health     Financial Resource Strain: Medium Risk (2024)    Overall Financial Resource Strain (CARDIA)     Difficulty of Paying Living Expenses: Somewhat hard   Food Insecurity: No Food Insecurity (2024)    Hunger Vital Sign     Worried About Running Out of Food in the Last Year:  Never true     Ran Out of Food in the Last Year: Never true   Transportation Needs: No Transportation Needs (1/31/2024)    PRAPARE - Transportation     Lack of Transportation (Medical): No     Lack of Transportation (Non-Medical): No   Physical Activity: Insufficiently Active (1/31/2024)    Exercise Vital Sign     Days of Exercise per Week: 2 days     Minutes of Exercise per Session: 20 min   Stress: No Stress Concern Present (1/31/2024)    [employer] - Occupational Stress Questionnaire     Feeling of Stress : Only a little   Housing Stability: Low Risk  (1/31/2024)    Housing Stability Vital Sign     Unable to Pay for Housing in the Last Year: No     Number of Places Lived in the Last Year: 1     Unstable Housing in the Last Year: No       Allergies:  Doxycycline    Medications:    Current Outpatient Medications:     amLODIPine (NORVASC) 10 MG tablet, Take 1 tablet (10 mg total) by mouth once daily., Disp: 90 tablet, Rfl: 3    aspirin (ECOTRIN) 81 MG EC tablet, Take 81 mg by mouth once daily., Disp: , Rfl:     chlorhexidine (HIBICLENS) 4 % external liquid, Apply topically daily as needed (staph decolonization)., Disp: 946 mL, Rfl: 11    clonazePAM (KLONOPIN) 0.5 MG tablet, Take 1 tablet (0.5 mg total) by mouth 2 (two) times a day., Disp: 60 tablet, Rfl: 5    DULoxetine (CYMBALTA) 30 MG capsule, Take 30 mg by mouth once daily. Plus 60 mg for total daily 90 mg, Disp: , Rfl:     DULoxetine (CYMBALTA) 60 MG capsule, Take 90 mg by mouth once daily., Disp: , Rfl: 1    gabapentin (NEURONTIN) 600 MG tablet, Take 1 tablet (600 mg total) by mouth 3 (three) times daily., Disp: 270 tablet, Rfl: 0    methylPREDNISolone (MEDROL DOSEPACK) 4 mg tablet, use as directed, Disp: 1 each, Rfl: 0    multivitamin capsule, Take 1 capsule by mouth once daily., Disp: , Rfl:     tadalafiL (CIALIS) 20 MG Tab, Take 1 tablet (20 mg total) by mouth daily as needed., Disp: 30 tablet, Rfl: 0    valsartan (DIOVAN) 320  MG tablet, Take 1 tablet (320 mg total) by mouth once daily., Disp: 90 tablet, Rfl: 3    Review of Systems    Objective:      Physical Exam  Genitourinary:     Comments: 30 g benign        Assessment:       1. Elevated PSA    2. Erectile dysfunction, unspecified erectile dysfunction type        Plan:       Sarkis was seen today for elevated psa.    Diagnoses and all orders for this visit:    Elevated PSA  -     Prostate Specific Antigen, Diagnostic; Future    Erectile dysfunction, unspecified erectile dysfunction type  The following orders have not been finalized:  -     tadalafiL (CIALIS) 20 MG Tab      Return to clinic 2-3 months with

## 2024-07-26 ENCOUNTER — TELEPHONE (OUTPATIENT)
Dept: PAIN MEDICINE | Facility: CLINIC | Age: 49
End: 2024-07-26
Payer: COMMERCIAL

## 2024-09-13 ENCOUNTER — PATIENT MESSAGE (OUTPATIENT)
Dept: NEUROSURGERY | Facility: CLINIC | Age: 49
End: 2024-09-13
Payer: COMMERCIAL

## 2024-09-13 RX ORDER — GABAPENTIN 600 MG/1
600 TABLET ORAL 3 TIMES DAILY
Qty: 270 TABLET | Refills: 0 | Status: SHIPPED | OUTPATIENT
Start: 2024-09-13 | End: 2025-09-13

## 2024-10-13 ENCOUNTER — OFFICE VISIT (OUTPATIENT)
Dept: URGENT CARE | Facility: CLINIC | Age: 49
End: 2024-10-13
Payer: COMMERCIAL

## 2024-10-13 VITALS
DIASTOLIC BLOOD PRESSURE: 76 MMHG | BODY MASS INDEX: 42.66 KG/M2 | SYSTOLIC BLOOD PRESSURE: 125 MMHG | TEMPERATURE: 99 F | WEIGHT: 315 LBS | RESPIRATION RATE: 20 BRPM | HEIGHT: 72 IN | HEART RATE: 95 BPM | OXYGEN SATURATION: 95 %

## 2024-10-13 DIAGNOSIS — L02.91 ABSCESS: Primary | ICD-10-CM

## 2024-10-13 PROCEDURE — 10060 I&D ABSCESS SIMPLE/SINGLE: CPT | Mod: S$GLB,,, | Performed by: FAMILY MEDICINE

## 2024-10-13 PROCEDURE — 87070 CULTURE OTHR SPECIMN AEROBIC: CPT | Performed by: FAMILY MEDICINE

## 2024-10-13 PROCEDURE — 99499 UNLISTED E&M SERVICE: CPT | Mod: S$GLB,,, | Performed by: FAMILY MEDICINE

## 2024-10-13 PROCEDURE — 87077 CULTURE AEROBIC IDENTIFY: CPT | Performed by: FAMILY MEDICINE

## 2024-10-13 PROCEDURE — 87186 SC STD MICRODIL/AGAR DIL: CPT | Performed by: FAMILY MEDICINE

## 2024-10-13 PROCEDURE — 87075 CULTR BACTERIA EXCEPT BLOOD: CPT | Performed by: FAMILY MEDICINE

## 2024-10-13 RX ORDER — MUPIROCIN 20 MG/G
OINTMENT TOPICAL
Qty: 22 G | Refills: 1 | Status: SHIPPED | OUTPATIENT
Start: 2024-10-13

## 2024-10-13 RX ORDER — SULFAMETHOXAZOLE AND TRIMETHOPRIM 800; 160 MG/1; MG/1
1 TABLET ORAL 2 TIMES DAILY
Qty: 14 TABLET | Refills: 0 | Status: SHIPPED | OUTPATIENT
Start: 2024-10-13 | End: 2024-10-20

## 2024-10-13 NOTE — PROGRESS NOTES
"Subjective:      Patient ID: Sarkis Saez Jr. is a 49 y.o. male.    Vitals:  height is 6' (1.829 m) and weight is 158.8 kg (350 lb) (abnormal). His temperature is 98.7 °F (37.1 °C). His blood pressure is 125/76 and his pulse is 95. His respiration is 20 and oxygen saturation is 95%.     Chief Complaint: Abscess    This is a 49 y.o. male   who presents today with a chief complaint of an abscess located under right armpit. He noticed the abscess two days ago. He's complaining of redness and swelling. He's been using Hibiclens to help relieve his symptoms.     Abscess  Chronicity:  New  Onset:  2 days ago  Progression Since Onset: rapidly worsening  Abscess location: right armpit.  Associated Symptoms: no fever, no chills, no sweats  Characteristics: painful and redness    Characteristics: not draining, no itching, no dryness, no scaling, no peeling, no swelling, no bruising and no blistering    Pain Scale:  4/10  Ineffective treatments: hibiclens.      Constitution: Negative for chills and fever.   Skin:  Positive for abscess.      Objective:     Physical Exam   Abdominal: Normal appearance.   Neurological: He is alert.   Skin: Skin is warm, dry and rash.         Comments: Large 4 x 4 subcutaneous mass of rt axilla/chest wall region with a second smaller abscess anterior to the larger mass - the second is 2 cm in diameter.      Nursing note and vitals reviewed.    Incision & Drainage    Date/Time: 10/13/2024 4:30 PM    Performed by: Regine Villalba DO  Authorized by: Regine Villalba DO    Time out: Immediately prior to procedure a "time out" was called to verify the correct patient, procedure, equipment, support staff and site/side marked as required.    Consent Done?:  Yes (Verbal)    Type:  Abscess  Body area:  Trunk  Location details:  Chest  Anesthesia:  Local infiltration  Local anesthetic: Lidocaine 1% without epinephrine  Scalpel size:  11  Incision type:  Single straight  Incision depth: " subcutaneous    Complexity:  Simple  Drainage:  Pus and serosanguinous  Drainage amount:  Moderate  Wound treatment:  Incision and wound left open  Patient tolerance:  Patient tolerated the procedure well with no immediate complications  Pain Assessment: 5    Bactroban applied. Cultures taken     Assessment:     1. Abscess        Plan:       Abscess  -     CULTURE, ANAEROBE  -     Aerobic culture  -     mupirocin (BACTROBAN) 2 % ointment; Apply to affected area 3 times daily  Dispense: 22 g; Refill: 1  -     sulfamethoxazole-trimethoprim 800-160mg (BACTRIM DS) 800-160 mg Tab; Take 1 tablet by mouth 2 (two) times daily. for 7 days  Dispense: 14 tablet; Refill: 0    Pt or guardian provided educational materials and instructions regarding their visit diagnosis.

## 2024-10-13 NOTE — PATIENT INSTRUCTIONS
"Patient education: Methicillin-resistant Staphylococcus aureus (MRSA) (Beyond the Basics)  Author:  Sarkis Meredith MD, MPH  Section :  Fred Weinstein MD  [place] :  Radha Chaney MD, MS  Contributor Disclosures  All topics are updated as new evidence becomes available and our peer review process is complete.  Literature review current through: Jan 2022.  This topic last updated: Nov 20, 2020.    Please read the Disclaimer at the end of this page.  ANTIBIOTIC-RESISTANT STAPH INFECTIONS -- Staphylococcus aureus (Staph aureus or "Staph") is a bacterium that is carried on the skin or nasal lining of up to 30 percent of healthy individuals. In this setting, the bacteria usually cause no symptoms.  However, when the skin is damaged, even with a minor injury such as a scratch or a small cut from shaving, Staph can cause a wide range of problems. These problems can range from a mild pimple to severe illness, especially in young children, older adults, and people with a weakened immune system.  This topic review discusses the signs and symptoms, diagnostic tests, treatment, and prevention of a particularly dangerous form of Staph aureus called methicillin-resistant Staphylococcus aureus (MRSA; pronounced "Mursa").  WHERE DID MRSA COME FROM? -- Initially, most Staph infections were sensitive to penicillin. In the 1950s, many infections became resistant to penicillin and methicillin (a related drug developed to treat these germs). Thus, the term methicillin-resistant Staphylococcus aureus (MRSA) was derived.   Unfortunately, some strains of Staph have become resistant to methicillin and other similar antibiotics. These strains are known as MRSA, which cannot be cured with traditional penicillin-related drugs. Instead, MRSA must be treated with alternate antibiotics.   Initially, after problems with MRSA were recognized, most cases of MRSA infection occurred in people who were hospitalized or lived in nursing " "homes. However, in the 1990s, MRSA infections were recognized in people who were not hospitalized or in contact with people in nursing homes. In the 1990s and 2000s, the frequency of cases of MRSA in people who live in the community increased greatly.  We now recognize that infections with MRSA occur in three specific groups of people: (1) persons currently in the hospital (hospital-associated MRSA), (2) persons with recent hospitalization or ongoing contact with medical clinics, dialysis units, or those undergoing complex outpatient treatments, such as chemotherapy (health care-associated MRSA), and (3) persons in the community (community-associated MRSA).  HOW IS MRSA SPREAD? -- You can be "colonized" with MRSA, meaning that you carry the bacteria on your skin or in your nose but you have no signs or symptoms of the illness. You can become colonized with MRSA in a variety of ways:  ?By touching the skin of another person who is colonized with MRSA  ?By touching a contaminated surface (such as a countertop, door handle, or phone)  You can develop an infection from MRSA if your skin is colonized and the bacteria enter an opening (eg, a cut, scrape, or wound) in the skin.  MRSA RISK FACTORS -- Anyone can become colonized and then infected with MRSA, although certain people are at a higher risk.  Hospital care -- Risk factors for becoming infected with hospital-associated MRSA include the following:  ?Having a surgical wound and/or intravenous (IV) line  ?Being hospitalized for a prolonged period of time  ?Recent use of antibiotics  ?Having a weakened immune system due to a medical condition or its treatment (eg, receiving medications that weaken the immune system)  ?Being in close proximity to other patients, family members, or health care workers who are colonized with MRSA  In hospitals and other long-term health care facilities, MRSA can be spread from one patient to another on the hands of health care workers. " "Hands may become contaminated with MRSA when health care workers touch a patient's skin, wounds, wound dressings, or devices, such as IV tubing.  Preventive measures are discussed below. (See 'Prevention in the hospital' below.)  Hemodialysis -- People who need hemodialysis for kidney failure have a substantially higher risk of becoming infected with MRSA compared with other patients. (See "Patient education: Hemodialysis (Beyond the Basics)".)  Community-associated MRSA -- You can  MRSA outside the hospital, especially if you:  ?Have skin trauma (eg, "turf burns," cuts, or sores)  ?Are an athlete  ?Shave or wax to remove body hair, particularly of the armpits and groin  ?Have tattoos or body piercing  ?Have physical contact with a person who has a draining cut or sore or is a carrier of MRSA  ?Share personal items or equipment that is not cleaned or laundered between users (such as towels or protective sport pads)  Community-associated MRSA infections may occur more commonly in certain populations, such as  centers, prisons, in the , or in athletes who play on a team. Spread of MRSA within households is common.  MRSA SYMPTOMS -- Some people infected with community-associated MRSA (CA-MRSA) have signs of a skin infection. Such skin infections may appear spontaneously and, in some cases, may be mistaken for a spider bite. The skin may have a single raised red lump that is tender, a cluster of "pimples," or a large, tender lump that drains pus (called a carbuncle). The area may enlarge and become progressively more tender, red, and swollen. The center of the raised area may ooze pus.  It is also possible to develop an infection in areas other than the skin if the bacteria enter the bloodstream through an opening in the skin. Infection can then develop on a heart valve, in a bone, joint, or the lungs, or around devices (such as an intravenous [IV] line, pacemaker, or replacement joint). In these " situations, symptoms may include fever and fatigue as well as pain or swelling in the infected area.  MRSA DIAGNOSIS -- People with skin infections can be tested for MRSA with a culture. Results of the test are usually available in 24 to 72 hours.  People with infections of the lung, bone, joint, or other internal areas usually require blood tests as well as imaging studies (eg, x-ray, computed tomography [CT] scan, echocardiogram).  MRSA TREATMENT -- If MRSA infection is diagnosed, you will be given an antibiotic. The antibiotic dose or type may be changed when the results of the laboratory culture are available.  At home -- Treatment of MRSA at home usually includes a 7- to 10-day course of an antibiotic (by mouth) such as trimethoprim-sulfamethoxazole (brand name: Bactrim), clindamycin, minocycline, linezolid, or doxycycline. It is very important to carefully follow the instructions for taking the antibiotic; this means taking it on time and finishing the entire course of treatment, even if you feel better after a few days. If the oral antibiotic is not effective or if the infection is making you ill, your doctor might try a different antibiotic instead, or you may need to be treated in the hospital. (See 'In the hospital' below.).  In addition to antibiotics, your health care provider may drain the infected area by inserting a needle or making a small cut in the skin. This is done to reduce the amount of infected material (pus), which will help the tissue to heal. You should not try to drain a boil or pimple on your own because this could worsen the infection.  In some cases, additional strategies may be used for management of household spread and/or recurrent infection. These may include use of mupirocin ointment, chlorhexidine soap, and other techniques. These strategies are not always fully effective.  In the hospital -- Hospitalized people with MRSA infections are usually treated with an intravenous  "medication. The intravenous antibiotic is usually continued until the person is improving.  In many cases, the person will be given antibiotics after discharge from the hospital, either by mouth or by intravenous (IV). This may be needed for a short period of time or for as long as six to eight weeks. Intravenous antibiotics can be given at home, by a visiting nurse, or in a rehabilitation facility.  In the hospital, if you are colonized but not infected with MRSA, you may be treated with mupirocin ointment and chlorhexidine soap.  MRSA PREVENTION -- A number of prevention strategies are recommended to avoid becoming infected with MRSA.  Prevention in the hospital -- In the hospital, MRSA is commonly spread to patients from the hands of health care workers. To minimize this risk, patients and family members can help to ensure that anyone who comes in contact with the patient washes their hands or uses an alcohol-based hand  before and after touching the patient. Patients with active infection should also wash their hands frequently.  Hospitalized patients who are colonized or infected with MRSA should be placed on "contact precautions." This means that health care workers entering the patient's room must wear gloves and a clean cover gown to prevent contamination of their clothing.  Prevention in the community -- The best way to prevent and control MRSA in the community is not clear. The [place] Centers for Disease Control and Prevention has made the following recommendations [1]:  ?Keep hands clean by washing thoroughly with soap and water. Hands should be wet with water and plain soap and be rubbed together for 15 to 30 seconds. Special attention should be paid to the fingernails, between the fingers, and the wrists. Hands should be rinsed thoroughly and dried with a single-use towel (eg, paper towels).  ?Alcohol-based hand sanitizers are a good alternative for disinfecting hands if a sink is not " available. Hand sanitizers should be rubbed over the entire surface of hands, fingers, and wrists until dry and may be used several times. Hand sanitizers are available as a liquid or wipe in small, portable sizes that are easy to carry in a pocket or handbag. When a sink is available, visibly soiled hands should be washed with soap and water.  ?Keep cuts and scrapes clean, dry, and covered with a bandage until healed.  ?Avoid touching other people's wounds or bandages.  ?Avoid sharing personal items such as towels, washcloths, razors, clothing, or uniforms. Other items that should not be shared include brushes, barba, and makeup.  ?Students who participate in team sports should shower after every athletic activity using soap and clean towels. Athletes with skin infections should receive prompt treatment and should not compete when they have draining or active skin infections.  ?People who use exercise machines at sports clubs or schools should be sure to wipe down the equipment, including the hand , with an alcohol-based solution after using it.  Care for family members of infected person -- Careful preventive measures, including washing hands, keeping wounds covered, washing bed sheets and towels, and avoiding shared personal items, are recommended for family members of a person with community-associated MRSA infection. There is no role for routine use of antibiotics for family members who have no signs or symptoms of infection.   Basic infection prevention measures -- There are a number of other measures that may help to prevent the spread of infections, including infection with MRSA.  ?Use a tissue to cover the mouth when sneezing or coughing. Used tissues should be disposed of promptly. Sneezing/coughing into the sleeve of one's clothing (at the inner elbow) is another means of containing sprays of saliva and secretions and has the advantage of not contaminating the hands.  ?Use of disinfectant  (antimicrobial cleaning agent) on surfaces (eg, counters, doorknobs, phones, computer keyboards) can help to reduce or eliminate bacteria.  Should I be tested for MRSA? -- Experts do not recommend widespread testing for MRSA for patients with no symptoms. The risk of becoming infected is small; only about 4 out of 10,000 people living in the community develop the infection per year.  WHERE TO GET MORE INFORMATION -- Your health care provider is the best source of information for questions and concerns related to your medical problem.  This article will be updated as needed on our website ([email or web address]). Related topics for patients, as well as selected articles written for health care professionals, are also available. Some of the most relevant are listed below.  Patient level information -- [employer] offers two types of patient education materials.  The Basics -- The Basics patient education pieces answer the four or five key questions a patient might have about a given condition. These articles are best for patients who want a general overview and who prefer short, easy-to-read materials.  Patient education: Methicillin-resistant Staphylococcus aureus (MRSA) (The Basics)  Patient education: Cellulitis and erysipelas (skin infections) (The Basics)  Patient education: What you should know about antibiotics (The Basics)  Patient education: Hospital-acquired pneumonia (The Basics)  Beyond the Basics -- Beyond the Basics patient education pieces are longer, more sophisticated, and more detailed. These articles are best for patients who want in-depth information and are comfortable with some medical jargon.  Patient education: Hemodialysis (Beyond the Basics)  Professional level information -- Professional level articles are designed to keep doctors and other health professionals up-to-date on the latest medical findings. These articles are thorough, long, and complex, and they contain multiple references to  the research on which they are based. Professional level articles are best for people who are comfortable with a lot of medical terminology and who want

## 2024-10-14 ENCOUNTER — OFFICE VISIT (OUTPATIENT)
Dept: PAIN MEDICINE | Facility: CLINIC | Age: 49
End: 2024-10-14
Payer: COMMERCIAL

## 2024-10-14 VITALS
WEIGHT: 315 LBS | HEART RATE: 88 BPM | BODY MASS INDEX: 42.66 KG/M2 | HEIGHT: 72 IN | DIASTOLIC BLOOD PRESSURE: 78 MMHG | SYSTOLIC BLOOD PRESSURE: 139 MMHG

## 2024-10-14 DIAGNOSIS — M51.362 DEGENERATION OF INTERVERTEBRAL DISC OF LUMBAR REGION WITH DISCOGENIC BACK PAIN AND LOWER EXTREMITY PAIN: ICD-10-CM

## 2024-10-14 DIAGNOSIS — M51.26 HERNIATED NUCLEUS PULPOSUS, LUMBAR: ICD-10-CM

## 2024-10-14 DIAGNOSIS — M54.16 LUMBAR RADICULOPATHY: Primary | ICD-10-CM

## 2024-10-14 DIAGNOSIS — M51.26 LUMBAR DISC HERNIATION: ICD-10-CM

## 2024-10-14 DIAGNOSIS — G89.4 CHRONIC PAIN SYNDROME: ICD-10-CM

## 2024-10-14 PROCEDURE — 99999 PR PBB SHADOW E&M-EST. PATIENT-LVL IV: CPT | Mod: PBBFAC,,, | Performed by: NURSE PRACTITIONER

## 2024-10-14 PROCEDURE — 3078F DIAST BP <80 MM HG: CPT | Mod: CPTII,S$GLB,, | Performed by: NURSE PRACTITIONER

## 2024-10-14 PROCEDURE — 1159F MED LIST DOCD IN RCRD: CPT | Mod: CPTII,S$GLB,, | Performed by: NURSE PRACTITIONER

## 2024-10-14 PROCEDURE — 99214 OFFICE O/P EST MOD 30 MIN: CPT | Mod: S$GLB,,, | Performed by: NURSE PRACTITIONER

## 2024-10-14 PROCEDURE — 3044F HG A1C LEVEL LT 7.0%: CPT | Mod: CPTII,S$GLB,, | Performed by: NURSE PRACTITIONER

## 2024-10-14 PROCEDURE — 3075F SYST BP GE 130 - 139MM HG: CPT | Mod: CPTII,S$GLB,, | Performed by: NURSE PRACTITIONER

## 2024-10-14 PROCEDURE — 4010F ACE/ARB THERAPY RXD/TAKEN: CPT | Mod: CPTII,S$GLB,, | Performed by: NURSE PRACTITIONER

## 2024-10-14 PROCEDURE — 1160F RVW MEDS BY RX/DR IN RCRD: CPT | Mod: CPTII,S$GLB,, | Performed by: NURSE PRACTITIONER

## 2024-10-14 PROCEDURE — 3008F BODY MASS INDEX DOCD: CPT | Mod: CPTII,S$GLB,, | Performed by: NURSE PRACTITIONER

## 2024-10-14 RX ORDER — METHOCARBAMOL 750 MG/1
750 TABLET, FILM COATED ORAL NIGHTLY
Qty: 30 TABLET | Refills: 0 | Status: SHIPPED | OUTPATIENT
Start: 2024-10-14 | End: 2024-11-13

## 2024-10-14 NOTE — PROGRESS NOTES
Chronic Pain - Established Established Clinic Patient follow up Clinic Visit  .      Referring Physician: No ref. provider found    Chief Complaint:   Chief Complaint   Patient presents with    Low-back Pain    Leg Pain     Right        Interval History 10/14/2024:  48 y/o male with a Hx of right low back and right leg pain that has continued, he is s/p a recent a lumbar ZENIA targeting L4/5 that provided him 0% relief of his symptoms.  He reports continued pain pain score today is 8/10, he reports that his pain has continued to disrupt his quality of life, he denies any recent incident or trauma denies any bowel bladder dysfunction denies profound weakness at this time.  He is currently taking gabapentin 2400 mg daily spread out throughout the day.  I started him on tizanidine 4 mg q.h.s. last clinic visit however this does not help with sleep.  The issue is that he has a herniated disc at L4-5 that is affecting the L4-5 nerve root.  He has seen Neurosurgery in the past.    SUBJECTIVE: Interval History 6/19/2024:  48 y/o male that presents virtually, he was a direct procedure referral he is s/p  Right  L4/5 Lumbar TFESI 06/03/2024 80% relief of his symptoms for 2 days 80% and then the pain returned.  He reports continued relief of his right leg pain at 50% but the majority of his pain today is his right low back with radiating symptoms into his right hip.  He denies any paresthesia denies any profound weakness.  He states his worst pain is when he attempts to rise from a seated position.     Patient denies night fever/night sweats, urinary incontinence, bowel incontinence, significant weight loss, significant motor weakness, and loss of sensations.    Physical Therapy/Home Exercise: no  never prescribed    Per neurosurgery note-    HPI Sarkis Lynnarchana Cook is a 49 y.o. male who presents with the above CC.  I had seen this patient in 2019 for similar symptoms.  Eventually the pain went away.  He took diclofenac and  gabapentin at that time.  Patient states in August 2023 he started having pain in the right low back right buttock and right lateral lower leg.  It has gotten better since then but still happens especially in certain positions that he is in.  He describes the pain as burning sensation in the lower leg.  It tends to be worse in the morning and when putting pressure on the right side and better with standing.  Denies any left leg pain or paresthesias.  No weakness in the legs.     Patient has had PT in the past.  No ESIs.  No spine surgery.  Patient is currently taking ibuprofen PRN.          Pain Disability Index Review:      10/14/2024     8:41 AM   Last 3 PDI Scores   Pain Disability Index (PDI) 56       Pain Medications:  N/a      report:  Not applicable    Pain Procedures:   -07/22/2024 Lumbar Interlaminar Epidural Steroid Injection L4/L5 0%  -06/03/2024  Right  L4/5 Lumbar Transforaminal Epidural 80% relief for 2 days and then pain returned    Current Medications:  Gabapentin 600 mg TID   Imaging:   MRI LUMBAR SPINE WITHOUT CONTRAST     CLINICAL HISTORY:  Low back pain, symptoms persist with > 6wks conservative treatment; Dorsalgia, unspecified     TECHNIQUE:  Multiplanar, multisequence MR images were acquired from the thoracolumbar junction to the sacrum without the administration of contrast.     COMPARISON:  01/30/2024     FINDINGS:  Alignment: Normal.     Vertebrae: 5 lumbar-type vertebral bodies. No aggressive marrow replacement process or fracture.Nobone marrow edema .     Discs: Desiccation of disc material L4-L5 and L5-S1.     Cord: Normal. Conus terminates at .     Degenerative findings:     T12-L1: There is no focal disc herniation. No significant central canal narrowing . No significant neural foraminal narrowing.     L1-L2: There is no focal disc herniation. No significant central canal narrowing . No significant neural foraminal narrowing.     L2-L3: There is no focal disc herniation. No  significant central canal narrowing . No significant neural foraminal narrowing.     L3-L4: There is no focal disc herniation. No significant central canal narrowing . No significant neural foraminal narrowing.     L4-L5: Posterior central disc extrusion with concavity upon the anterior thecal sac margins and abutment of the RIGHT L5 nerve root.  Note: This finding is improved from prior examination of 2016.  Facet hypertrophy with RIGHT-sided joint effusion .  Mild central canal narrowing . No significant neural foraminal narrowing.     L5-S1: There is no focal disc herniation.Mild facet hypertrophy.  No significant central canal narrowing . No significant neural foraminal narrowing.     Paraspinal muscles & soft tissues: Unremarkable.     Impression:     Posterior central disc extrusion L4-L5 with concavity upon the anterior thecal sac margins and abutment of RIGHT L5 nerve root.  Associated facet hypertrophy result in mild central canal narrowing.  Note: The findings are improved from prior examination of 2016 which at that time demonstrated a more significant disc extrusion.    Past Medical History:   Diagnosis Date    Anxiety     Hypertension     Obesity     Sleep apnea      Past Surgical History:   Procedure Laterality Date    EPIDURAL STEROID INJECTION INTO LUMBAR SPINE N/A 2024    Procedure: Interlaminar ZENIA L4/L5;  Surgeon: Mine Dimas DO;  Location: [facility];  Service: Pain Management;  Laterality: N/A;  Oral sed-20mins-ASA 5d    INJECTION, SPINE, LUMBOSACRAL, TRANSFORAMINAL APPROACH Right 6/3/2024    Procedure: TFESI RT L4/L5;  Surgeon: Mine Dimas DO;  Location: [facility];  Service: Pain Management;  Laterality: Right;  20mins-ASA 5d     Social History     Socioeconomic History    Marital status:    Tobacco Use    Smoking status: Former     Types: Cigars     Quit date: 2014     Years since quittin.8    Smokeless tobacco: Never    Substance and Sexual Activity    Alcohol use: Yes   Social History Narrative    Pt is  at [employer].      Social Drivers of Health     Financial Resource Strain: Medium Risk (1/31/2024)    Overall Financial Resource Strain (CARDIA)     Difficulty of Paying Living Expenses: Somewhat hard   Food Insecurity: No Food Insecurity (1/31/2024)    Hunger Vital Sign     Worried About Running Out of Food in the Last Year: Never true     Ran Out of Food in the Last Year: Never true   Transportation Needs: No Transportation Needs (1/31/2024)    PRAPARE - Transportation     Lack of Transportation (Medical): No     Lack of Transportation (Non-Medical): No   Physical Activity: Insufficiently Active (1/31/2024)    Exercise Vital Sign     Days of Exercise per Week: 2 days     Minutes of Exercise per Session: 20 min   Stress: No Stress Concern Present (1/31/2024)    [employer] [place] [employer] - Occupational Stress Questionnaire     Feeling of Stress : Only a little   Housing Stability: Low Risk  (1/31/2024)    Housing Stability Vital Sign     Unable to Pay for Housing in the Last Year: No     Number of Places Lived in the Last Year: 1     Unstable Housing in the Last Year: No     Family History   Problem Relation Name Age of Onset    Diabetes Father      Hypertension Father      Heart disease Maternal Grandfather      Heart disease Paternal Grandfather         Review of patient's allergies indicates:   Allergen Reactions    Doxycycline Hives       Current Outpatient Medications   Medication Sig    amLODIPine (NORVASC) 10 MG tablet Take 1 tablet (10 mg total) by mouth once daily.    aspirin (ECOTRIN) 81 MG EC tablet Take 81 mg by mouth once daily.    chlorhexidine (HIBICLENS) 4 % external liquid Apply topically daily as needed (staph decolonization).    clonazePAM (KLONOPIN) 0.5 MG tablet Take 1 tablet (0.5 mg total) by mouth 2 (two) times a day.    DULoxetine (CYMBALTA) 30 MG capsule Take 30 mg by  mouth once daily. Plus 60 mg for total daily 90 mg    DULoxetine (CYMBALTA) 60 MG capsule Take 90 mg by mouth once daily.    gabapentin (NEURONTIN) 600 MG tablet Take 1 tablet (600 mg total) by mouth 3 (three) times daily.    multivitamin capsule Take 1 capsule by mouth once daily.    mupirocin (BACTROBAN) 2 % ointment Apply to affected area 3 times daily    sulfamethoxazole-trimethoprim 800-160mg (BACTRIM DS) 800-160 mg Tab Take 1 tablet by mouth 2 (two) times daily. for 7 days    tadalafiL (CIALIS) 20 MG Tab Take 1 tablet (20 mg total) by mouth daily as needed.    valsartan (DIOVAN) 320 MG tablet Take 1 tablet (320 mg total) by mouth once daily.     No current facility-administered medications for this visit.       REVIEW OF SYSTEMS:    GENERAL:  No weight loss, malaise or fevers.  HEENT:  Negative for frequent or significant headaches.  NECK:  Negative for lumps, goiter, pain and significant neck swelling.  RESPIRATORY:  Negative for cough, wheezing or shortness of breath.  CARDIOVASCULAR:  Negative for chest pain, leg swelling or palpitations.  GI:  Negative for abdominal discomfort, blood in stools or black stools or change in bowel habits.  MUSCULOSKELETAL:  See HPI.  SKIN:  Negative for lesions, rash, and itching.  PSYCH:  Negative for sleep disturbance, mood disorder and recent psychosocial stressors.  HEMATOLOGY/LYMPHOLOGY:  Negative for prolonged bleeding, bruising easily or swollen nodes.  NEURO:   No history of headaches, syncope, paralysis, seizures or tremors.  All other reviewed and negative other than HPI.    OBJECTIVE:    /78   Pulse 88   Ht 6' (1.829 m)   Wt (!) 163.1 kg (359 lb 9.1 oz)   BMI 48.77 kg/m²   There was no PE done for this visit    PHYSICAL EXAMINATION:  GEN: No acute distress. Calm, comfortable  HENT: Normocephalic, atraumatic, moist mucous membranes  EYE: Anicteric sclera, non-injected.   CV: Non-diaphoretic.   RESP: Breathing comfortably. Chest expansion symmetric.  PSYCH:  Pleasant mood and appropriate affect. Recent and remote memory intact.     ASSESSMENT: 49 y.o. year old male with mainly right  low back pain and intermittently right leg  pain, consistent with     1. Lumbar radiculopathy  Ambulatory referral/consult to Physical/Occupational Therapy      2. Lumbar disc herniation  Ambulatory referral/consult to Physical/Occupational Therapy      3. Degeneration of intervertebral disc of lumbar region with discogenic back pain and lower extremity pain  Ambulatory referral/consult to Physical/Occupational Therapy      4. Herniated nucleus pulposus, lumbar        5. Chronic pain syndrome                PLAN:   -none at this time discussed with patient that we will consider in the future a lumbar interlaminar ZENIA targeting L4-5 in effort to reduce his low back pain related to his herniated disc.    -continue gabapentin 600 mg t.i.d. taking 2 capsules at night and 1 in the morning and 1 in the afternoon   -Continue Tylenol a 1000 mg t.i.d. not to exceed 3000 mg in 24  hr period.   -Failed tizanidine 4 mg QHS    -Rerturn to nsgy to discuss plan of care, we will message Neurosurgery for a follow-up appointment.  - I have stressed the importance of physical activity and a home exercise plan to help with pain and improve health.  - Referral to Physical therapy for Lumbar stabilization, core strengthening, and a home exercise program.  - Patient can continue with medications for now since they are providing benefits, using them appropriately, and without side effects.  - Counseled patient regarding the importance of activity modification, constant sleeping habits, and physical therapy.  -RTC in 2-3 months or sooner if needed.   The above plan and management options were discussed at length with patient. Patient is in agreement with the above and verbalized understanding. It will be communicated with the referring physician via electronic record, fax, or mail.    Lexa Low  [facility]ie[facility]  Interventional Pain Management    10/14/2024

## 2024-10-15 ENCOUNTER — TELEPHONE (OUTPATIENT)
Dept: NEUROSURGERY | Facility: CLINIC | Age: 49
End: 2024-10-15
Payer: COMMERCIAL

## 2024-10-15 NOTE — TELEPHONE ENCOUNTER
----- Message from DANNY Romero sent at 10/14/2024  9:29 AM CDT -----  Regarding: follow back up  Can you see this patient his pain has been refractory x2 injections   Herniated disc L4/5 I sent him to PT    Thanks     CL

## 2024-10-15 NOTE — TELEPHONE ENCOUNTER
Please schedule him a EP fu with Dr. Ayala for lumbar spine surgery consult.    I have seen in multiple times already and he has failed conservative treatment.    Thanks,  Yamilet Abdi, [facility], PA-C  Neurosurgery  [facility]  10/15/2024

## 2024-10-16 ENCOUNTER — PATIENT MESSAGE (OUTPATIENT)
Dept: INFECTIOUS DISEASES | Facility: CLINIC | Age: 49
End: 2024-10-16
Payer: COMMERCIAL

## 2024-10-16 ENCOUNTER — TELEPHONE (OUTPATIENT)
Dept: URGENT CARE | Facility: CLINIC | Age: 49
End: 2024-10-16
Payer: COMMERCIAL

## 2024-10-16 DIAGNOSIS — Z22.322 MRSA (METHICILLIN RESISTANT STAPH AUREUS) CULTURE POSITIVE: Primary | ICD-10-CM

## 2024-10-16 LAB — BACTERIA SPEC AEROBE CULT: ABNORMAL

## 2024-10-16 NOTE — TELEPHONE ENCOUNTER
Pt returned phone call to clinic. Explained test results and plan of care. All questions answered.

## 2024-10-16 NOTE — TELEPHONE ENCOUNTER
Attempted to call patient to discuss test results, no answer. Voice message left for patient to call clinic back.    Wound culture was positive for MRSA. Patient was treated appropriately with Bactrim and mupirocin ointment. Patient should continue treatment as prescribed.    Due to positive MRSA culture, patient should implement staph decolonization protocol at home:  Bactroban (mupirocin 2% ointment): apply to nose and inhale if nasal applicator or if using ointment work up to inside of both nostrils. Use twice daily for 5 days.  Skin Washes: use chlorhexidene (Hibiclens) or hexachlorophene (Phisophex) every other day for 1-2 weeks and then decrease to twice weekly. Try to apply to all body areas (except eyes, mouth) and let sit for 5 minutes before showering off.  Use dilute bleach (10% or 1 part bleach to 9 parts water) to wipe down all bathroom surfaces twice the first week and then weekly for one month.  Any clothing that is next to skin and rarely washed should be washed/laundered in hot water +/- bleach--or thrown out. This includes sports equipment, athletic supports, etc.  Use hand sanitizers such as Purell frequently, especially if you take care of children or have contact with other people on a physical basis regularly.  Do not shave skin in armpits, groin or legs. If you must shave, first wash with Hibiclens as in #2 and then use a fresh disposable razor for each shaving.

## 2024-10-17 ENCOUNTER — OFFICE VISIT (OUTPATIENT)
Dept: URGENT CARE | Facility: CLINIC | Age: 49
End: 2024-10-17
Payer: COMMERCIAL

## 2024-10-17 VITALS
RESPIRATION RATE: 18 BRPM | OXYGEN SATURATION: 95 % | HEIGHT: 72 IN | SYSTOLIC BLOOD PRESSURE: 150 MMHG | DIASTOLIC BLOOD PRESSURE: 83 MMHG | WEIGHT: 315 LBS | BODY MASS INDEX: 42.66 KG/M2 | TEMPERATURE: 99 F | HEART RATE: 87 BPM

## 2024-10-17 DIAGNOSIS — L03.319 CELLULITIS OF TRUNK, UNSPECIFIED SITE OF TRUNK: ICD-10-CM

## 2024-10-17 DIAGNOSIS — Z22.322 MRSA (METHICILLIN RESISTANT STAPH AUREUS) CULTURE POSITIVE: Primary | ICD-10-CM

## 2024-10-17 PROCEDURE — 99214 OFFICE O/P EST MOD 30 MIN: CPT | Mod: S$GLB,,,

## 2024-10-17 RX ORDER — SULFAMETHOXAZOLE AND TRIMETHOPRIM 800; 160 MG/1; MG/1
1 TABLET ORAL 2 TIMES DAILY
Qty: 42 TABLET | Refills: 0 | Status: SHIPPED | OUTPATIENT
Start: 2024-10-17 | End: 2024-11-07

## 2024-10-17 NOTE — PROGRESS NOTES
Subjective:      Patient ID: Sarkis Saez Jr. is a 49 y.o. male.    Vitals:  height is 6' (1.829 m) and weight is 162.8 kg (359 lb) (abnormal). His oral temperature is 98.6 °F (37 °C). His blood pressure is 150/83 (abnormal) and his pulse is 87. His respiration is 18 and oxygen saturation is 95%.     Chief Complaint: Wound Check    Pt is a 48 yo male with PMHx of MRSA, HLD, HTN. He is presenting for wound check.  Onset of symptoms was 4 days ago after I&D of abscess. Wound culture returned positive for MRSA.  Pt reports using Bactrim and mupirocin. Denies any fever, chills, abnormal discharge.    Wound Check  He was originally treated 3 to 5 days ago. Previous treatment included I&D of abscess. His temperature was unmeasured prior to arrival. There has been bloody discharge from the wound. The redness has improved. The swelling has improved. There is no pain present. He has no difficulty moving the affected extremity or digit.       Constitution: Negative for activity change, appetite change, chills, fatigue and fever.   HENT:  Negative for ear pain, congestion, postnasal drip, sinus pain, sinus pressure and sore throat.    Neck: Negative for neck pain and neck swelling.   Cardiovascular:  Negative for chest pain and sob on exertion.   Eyes:  Negative for eye trauma, eye discharge and eye redness.   Respiratory:  Negative for cough, shortness of breath and wheezing.    Gastrointestinal:  Negative for abdominal pain, nausea, vomiting, constipation and diarrhea.   Genitourinary:  Negative for dysuria, frequency, urgency and urine decreased.   Musculoskeletal:  Negative for pain, joint pain, joint swelling, abnormal ROM of joint and muscle ache.   Skin:  Positive for erythema. Negative for color change, rash and laceration.   Neurological:  Negative for dizziness, light-headedness, altered mental status and numbness.   Psychiatric/Behavioral:  Negative for altered mental status and confusion.       Objective:      Physical Exam   Constitutional: He is oriented to person, place, and time. He appears well-developed. He is cooperative.      Comments:Pt sitting erect on examination table. No acute respiratory distress, no use of accessory muscles, no notice of nasal flaring.        HENT:   Head: Normocephalic and atraumatic.   Ears:   Right Ear: Hearing and external ear normal.   Left Ear: Hearing and external ear normal.   Nose: Nose normal. No mucosal edema or nasal deformity. No epistaxis. Right sinus exhibits no maxillary sinus tenderness and no frontal sinus tenderness. Left sinus exhibits no maxillary sinus tenderness and no frontal sinus tenderness.   Mouth/Throat: Uvula is midline, oropharynx is clear and moist and mucous membranes are normal. No trismus in the jaw. Normal dentition. No uvula swelling.   Eyes: Conjunctivae and lids are normal.   Neck: Trachea normal and phonation normal. Neck supple.   Abdominal: Normal appearance and bowel sounds are normal. Soft.   Musculoskeletal: Normal range of motion.         General: Normal range of motion.   Neurological: He is alert and oriented to person, place, and time. He exhibits normal muscle tone.   Skin: Skin is warm, dry, intact and abscessed (open wound where I&D occured with purulent drainage). erythema        Psychiatric: His speech is normal and behavior is normal. Judgment and thought content normal.   Nursing note and vitals reviewed.      Assessment:     1. MRSA (methicillin resistant staph aureus) culture positive    2. Cellulitis of trunk, unspecified site of trunk        Plan:   I have reviewed the patient chart and pertinent past imaging/labs.  Note with ID from 2/28/24 reviewed.    MRSA (methicillin resistant staph aureus) culture positive  -     sulfamethoxazole-trimethoprim 800-160mg (BACTRIM DS) 800-160 mg Tab; Take 1 tablet by mouth 2 (two) times daily. for 21 days  Dispense: 42 tablet; Refill: 0  -     Ambulatory referral/consult to Infectious  Disease    Cellulitis of trunk, unspecified site of trunk          Medical Decision Making:   Urgent Care Management:  Pt with history of similar presentation in the past, which was positive for MRSA. Evaluated by ID and treated with Bactrim for 30d. Will begin same treatment with referral to ID for further evaluation. Pt aware of sings/symptoms to monitor.

## 2024-10-18 LAB — BACTERIA SPEC ANAEROBE CULT: NORMAL

## 2025-01-28 DIAGNOSIS — I10 HYPERTENSION, ESSENTIAL: ICD-10-CM

## 2025-01-28 RX ORDER — VALSARTAN 320 MG/1
320 TABLET ORAL DAILY
Qty: 30 TABLET | OUTPATIENT
Start: 2025-01-28

## 2025-01-28 NOTE — TELEPHONE ENCOUNTER
Care Due:                  Date            Visit Type   Department     Provider  --------------------------------------------------------------------------------                                EP -                              PRIMARY      [facility] INTERNAL  Last Visit: 01-      CARE ([facility])   MEDICINE       [facility]NA[facility]  Next Visit: None Scheduled  None         None Found                                                            Last  Test          Frequency    Reason                     Performed    Due Date  --------------------------------------------------------------------------------    Office Visit  15 months..  amLODIPine, valsartan....  01- 04-    CMP.........  12 months..  valsartan................  01- 02-    [facility] Embedded Care Due Messages. Reference number: [id number].   1/28/2025 12:46:15 PM CST

## 2025-01-29 NOTE — TELEPHONE ENCOUNTER
Provider Staff:  Action required for this patient    Requires appointment   Requires labs      Please see care gap opportunities below in Care Due Message.    Thanks!  [facility]     Appointments      Date Provider   Last Visit   1/19/2024 Dave Hansen MD   Next Visit   1/28/2025 Dave Hansen MD     Refill Decision Note   Sarkis Saez  is requesting a refill authorization.  Brief Assessment and Rationale for Refill:  Quick Discontinue     Medication Therapy Plan: Pended in another encounter      Comments:     Note composed:8:07 PM 01/28/2025

## 2025-02-04 DIAGNOSIS — I10 HYPERTENSION, ESSENTIAL: ICD-10-CM

## 2025-02-04 RX ORDER — AMLODIPINE BESYLATE 10 MG/1
10 TABLET ORAL
Qty: 30 TABLET | OUTPATIENT
Start: 2025-02-04

## 2025-02-04 NOTE — TELEPHONE ENCOUNTER
No care due was identified.  [facility] Embedded Care Due Messages. Reference number: [id number].   2/04/2025 9:36:46 AM CST

## 2025-02-04 NOTE — TELEPHONE ENCOUNTER
Refill Decision Note   Sarkis Saez  is requesting a refill authorization.  Brief Assessment and Rationale for Refill:  Quick Discontinue     Medication Therapy Plan: DUPLICATE      Comments:     Note composed:12:15 PM 02/04/2025

## 2025-02-26 ENCOUNTER — PATIENT MESSAGE (OUTPATIENT)
Dept: INTERNAL MEDICINE | Facility: CLINIC | Age: 50
End: 2025-02-26
Payer: COMMERCIAL

## 2025-02-26 DIAGNOSIS — I10 HYPERTENSION, ESSENTIAL: ICD-10-CM

## 2025-02-26 RX ORDER — VALSARTAN 320 MG/1
320 TABLET ORAL DAILY
Qty: 90 TABLET | Refills: 0 | Status: SHIPPED | OUTPATIENT
Start: 2025-02-26

## 2025-02-26 RX ORDER — VALSARTAN 320 MG/1
320 TABLET ORAL DAILY
Qty: 30 TABLET | Refills: 0 | OUTPATIENT
Start: 2025-02-26

## 2025-02-26 NOTE — TELEPHONE ENCOUNTER
No care due was identified.  [facility] Embedded Care Due Messages. Reference number: [id number].   2/26/2025 10:33:41 AM CST

## 2025-02-26 NOTE — TELEPHONE ENCOUNTER
Refill Routing Note   Medication(s) are not appropriate for processing by [facility] for the following reason(s):        Required vitals abnormal  Required labs outdated    [facility] action(s):  Defer               Appointments  past 12m or future 3m with PCP    Date Provider   Last Visit   1/19/2024 Dave Hansen MD   Next Visit   2/26/2025 Dave Hansen MD   ED visits in past 90 days: 0        Note composed:12:22 PM 02/26/2025

## 2025-02-26 NOTE — TELEPHONE ENCOUNTER
Refill Decision Note   Sarkis Saez  is requesting a refill authorization.  Brief Assessment and Rationale for Refill:  Quick Discontinue     Medication Therapy Plan:  Duplicate      Comments:     Note composed:12:24 PM 02/26/2025

## 2025-02-26 NOTE — TELEPHONE ENCOUNTER
No care due was identified.  [facility] Embedded Care Due Messages. Reference number: [id number].   2/26/2025 10:34:03 AM CST

## 2025-03-06 ENCOUNTER — LAB VISIT (OUTPATIENT)
Dept: LAB | Facility: HOSPITAL | Age: 50
End: 2025-03-06
Attending: UROLOGY
Payer: COMMERCIAL

## 2025-03-06 ENCOUNTER — OFFICE VISIT (OUTPATIENT)
Dept: UROLOGY | Facility: CLINIC | Age: 50
End: 2025-03-06
Payer: COMMERCIAL

## 2025-03-06 VITALS — HEART RATE: 105 BPM | SYSTOLIC BLOOD PRESSURE: 144 MMHG | DIASTOLIC BLOOD PRESSURE: 86 MMHG

## 2025-03-06 DIAGNOSIS — N13.8 BPH WITH URINARY OBSTRUCTION: ICD-10-CM

## 2025-03-06 DIAGNOSIS — N13.8 BPH WITH URINARY OBSTRUCTION: Primary | ICD-10-CM

## 2025-03-06 DIAGNOSIS — N40.1 BPH WITH URINARY OBSTRUCTION: Primary | ICD-10-CM

## 2025-03-06 DIAGNOSIS — I10 HYPERTENSION, ESSENTIAL: ICD-10-CM

## 2025-03-06 DIAGNOSIS — N40.1 BPH WITH URINARY OBSTRUCTION: ICD-10-CM

## 2025-03-06 DIAGNOSIS — E11.8 DM TYPE 2 CAUSING COMPLICATION: ICD-10-CM

## 2025-03-06 DIAGNOSIS — E78.49 OTHER HYPERLIPIDEMIA: ICD-10-CM

## 2025-03-06 DIAGNOSIS — N52.01 ERECTILE DYSFUNCTION DUE TO ARTERIAL INSUFFICIENCY: ICD-10-CM

## 2025-03-06 LAB — COMPLEXED PSA SERPL-MCNC: 2.2 NG/ML (ref 0–4)

## 2025-03-06 PROCEDURE — 3077F SYST BP >= 140 MM HG: CPT | Mod: CPTII,S$GLB,, | Performed by: UROLOGY

## 2025-03-06 PROCEDURE — 99999 PR PBB SHADOW E&M-EST. PATIENT-LVL IV: CPT | Mod: PBBFAC,,, | Performed by: UROLOGY

## 2025-03-06 PROCEDURE — 84153 ASSAY OF PSA TOTAL: CPT | Performed by: UROLOGY

## 2025-03-06 PROCEDURE — 4010F ACE/ARB THERAPY RXD/TAKEN: CPT | Mod: CPTII,S$GLB,, | Performed by: UROLOGY

## 2025-03-06 PROCEDURE — 3079F DIAST BP 80-89 MM HG: CPT | Mod: CPTII,S$GLB,, | Performed by: UROLOGY

## 2025-03-06 PROCEDURE — 99214 OFFICE O/P EST MOD 30 MIN: CPT | Mod: S$GLB,,, | Performed by: UROLOGY

## 2025-03-06 PROCEDURE — 36415 COLL VENOUS BLD VENIPUNCTURE: CPT | Performed by: UROLOGY

## 2025-03-06 PROCEDURE — 1159F MED LIST DOCD IN RCRD: CPT | Mod: CPTII,S$GLB,, | Performed by: UROLOGY

## 2025-03-06 NOTE — PROGRESS NOTES
CHIEF COMPLAINT:    Mr. Saez is a 50 y.o. male presenting for a consultation at the request of Dr. Hansen. Patient presents with ED and LUTS.    PRESENTING ILLNESS:    Sarkis Saez Jr. is a 50 y.o. male with ED and LUTS.    Patient reports ED for > 1 year. He has tried Cialis with good results.                          He has LUTS. Nocturia x3, has LEE and does not use CPAP. +frequency, +urgency.  No hematuria.  No dysuria.       REVIEW OF SYSTEMS:    Sarkis Saez Jr. denies headache, blurred vision, fever, nausea, vomiting, chills, abdominal pain, chest pain, sore throat, bleeding per rectum, cough, SOB, recent loss of consciousness, recent mental status changes, seizures, dizziness, or upper or lower extremity weakness.    TALA  1. 3  2. 4  3. 4  4. 4  5. 4      PATIENT HISTORY:    Past Medical History:   Diagnosis Date    Anxiety     Hypertension     Obesity     Sleep apnea        Past Surgical History:   Procedure Laterality Date    EPIDURAL STEROID INJECTION INTO LUMBAR SPINE N/A 7/22/2024    Procedure: Interlaminar ZENIA L4/L5;  Surgeon: Mine Dimas DO;  Location: [facility];  Service: Pain Management;  Laterality: N/A;  Oral sed-20mins-ASA 5d    INJECTION, SPINE, LUMBOSACRAL, TRANSFORAMINAL APPROACH Right 6/3/2024    Procedure: TFESI RT L4/L5;  Surgeon: Mine Dimas DO;  Location: [facility];  Service: Pain Management;  Laterality: Right;  20mins-ASA 5d       Family History   Problem Relation Name Age of Onset    Diabetes Father      Hypertension Father      Heart disease Maternal Grandfather      Heart disease Paternal Grandfather         Social History[1]    Allergies:  Doxycycline    Medications:  Current Medications[2]    PHYSICAL EXAMINATION:    The patient generally appears in good health, is appropriately interactive, and is in no apparent distress.     Eyes: anicteric sclerae, moist conjunctivae; no lid-lag; PERRLA     HENT: Atraumatic; oropharynx clear  with moist mucous membranes and no mucosal ulcerations;normal hard and soft palate.  No evidence of lymphadenopathy.    Neck: Trachea midline.  No thyromegaly.    Skin: No lesions.    Mental: Cooperative with normal affect.  Is oriented to time, place, and person.    Neuro: Grossly intact.    Chest: Normal inspiratory effort.   No accessory muscles.  No audible wheezes.  Respirations symmetric on inspiration and expiration.    Heart: Regular rhythm.      Abdomen:  Soft, non-tender. No masses or organomegaly. Bladder is not palpable. No evidence of flank discomfort. No evidence of inguinal hernia.    Genitourinary: The penis is circumcised with no evidence of plaques or induration. The urethral meatus is normal. The testes, epididymides, and cord structures are normal in size and contour bilaterally. The scrotum is normal in size and contour.    Normal anal sphincter tone. No rectal mass.    The prostate is 30 g. Normal landmarks. Lateral sulci. Median furrow intact.  No nodularity or induration. Seminal vesicles are normal.    Extremities: No clubbing, cyanosis, or edema      LABS:    UA with glucose. Negative for all other components.   Lab Results   Component Value Date    PSA 1.6 01/19/2024    PSA 1.1 09/28/2022       IMPRESSION:    Encounter Diagnoses   Name Primary?    BPH with urinary obstruction Yes    Erectile dysfunction due to arterial insufficiency     Hypertension, essential    HTN, stable      PLAN:    1.  Discussed that he has glucose in his urine today which indicates poorly controlled DM which will worsen his LUTS.  Recommend he see endocrine for DM control.  2. Discussed options for his ED (affected by above comorbidities).  Continue Cialis.  3. RTC 6 months to evaluate his LUTS after getting DM better controlled with an LOWELL.  4. Will draw a PSA today as he's due.    Copy to: Dr. Dave Hansen           [1]   Social History  Socioeconomic History    Marital status:    Tobacco Use     Smoking status: Former     Types: Cigars     Quit date: 12/13/2014     Years since quitting: 10.2    Smokeless tobacco: Never   Substance and Sexual Activity    Alcohol use: Yes   Social History Narrative    Pt is  at [employer].      Social Drivers of Health     Financial Resource Strain: Low Risk  (3/5/2025)    Overall Financial Resource Strain (CARDIA)     Difficulty of Paying Living Expenses: Not very hard   Food Insecurity: No Food Insecurity (3/5/2025)    Hunger Vital Sign     Worried About Running Out of Food in the Last Year: Never true     Ran Out of Food in the Last Year: Never true   Transportation Needs: No Transportation Needs (3/5/2025)    PRAPARE - Transportation     Lack of Transportation (Medical): No     Lack of Transportation (Non-Medical): No   Physical Activity: Insufficiently Active (3/5/2025)    Exercise Vital Sign     Days of Exercise per Week: 2 days     Minutes of Exercise per Session: 20 min   Stress: No Stress Concern Present (3/5/2025)    [employer] [place] [employer] - Occupational Stress Questionnaire     Feeling of Stress : Only a little   Housing Stability: Low Risk  (3/5/2025)    Housing Stability Vital Sign     Unable to Pay for Housing in the Last Year: No     Number of Times Moved in the Last Year: 0     Homeless in the Last Year: No   [2]   Current Outpatient Medications:     amLODIPine (NORVASC) 10 MG tablet, Take 1 tablet (10 mg total) by mouth once daily., Disp: 90 tablet, Rfl: 0    aspirin (ECOTRIN) 81 MG EC tablet, Take 81 mg by mouth once daily., Disp: , Rfl:     clonazePAM (KLONOPIN) 0.5 MG tablet, Take 1 tablet (0.5 mg total) by mouth 2 (two) times a day., Disp: 60 tablet, Rfl: 5    DULoxetine (CYMBALTA) 30 MG capsule, Take 30 mg by mouth once daily. Plus 60 mg for total daily 90 mg, Disp: , Rfl:     DULoxetine (CYMBALTA) 60 MG capsule, Take 90 mg by mouth once daily., Disp: , Rfl: 1    gabapentin (NEURONTIN) 600 MG tablet, Take 1 tablet (600  mg total) by mouth 3 (three) times daily., Disp: 270 tablet, Rfl: 0    multivitamin capsule, Take 1 capsule by mouth once daily., Disp: , Rfl:     mupirocin (BACTROBAN) 2 % ointment, Apply to affected area 3 times daily, Disp: 22 g, Rfl: 1    tadalafiL (CIALIS) 20 MG Tab, Take 1 tablet (20 mg total) by mouth daily as needed., Disp: 30 tablet, Rfl: 11    valsartan (DIOVAN) 320 MG tablet, Take 1 tablet (320 mg total) by mouth once daily., Disp: 90 tablet, Rfl: 0

## 2025-03-12 ENCOUNTER — LAB VISIT (OUTPATIENT)
Dept: LAB | Facility: HOSPITAL | Age: 50
End: 2025-03-12
Attending: FAMILY MEDICINE
Payer: COMMERCIAL

## 2025-03-12 ENCOUNTER — OFFICE VISIT (OUTPATIENT)
Dept: INTERNAL MEDICINE | Facility: CLINIC | Age: 50
End: 2025-03-12
Attending: FAMILY MEDICINE
Payer: COMMERCIAL

## 2025-03-12 VITALS
WEIGHT: 315 LBS | SYSTOLIC BLOOD PRESSURE: 114 MMHG | BODY MASS INDEX: 42.66 KG/M2 | DIASTOLIC BLOOD PRESSURE: 72 MMHG | HEART RATE: 99 BPM | HEIGHT: 72 IN | OXYGEN SATURATION: 94 %

## 2025-03-12 DIAGNOSIS — N13.8 BPH WITH URINARY OBSTRUCTION: ICD-10-CM

## 2025-03-12 DIAGNOSIS — R73.9 ELEVATED BLOOD SUGAR: ICD-10-CM

## 2025-03-12 DIAGNOSIS — Z12.5 PROSTATE CANCER SCREENING: ICD-10-CM

## 2025-03-12 DIAGNOSIS — Z13.6 ENCOUNTER FOR SCREENING FOR CARDIOVASCULAR DISORDERS: ICD-10-CM

## 2025-03-12 DIAGNOSIS — Z00.00 ANNUAL PHYSICAL EXAM: ICD-10-CM

## 2025-03-12 DIAGNOSIS — I10 HYPERTENSION, ESSENTIAL: ICD-10-CM

## 2025-03-12 DIAGNOSIS — N40.1 BPH WITH URINARY OBSTRUCTION: ICD-10-CM

## 2025-03-12 DIAGNOSIS — E78.5 HYPERLIPIDEMIA, UNSPECIFIED HYPERLIPIDEMIA TYPE: ICD-10-CM

## 2025-03-12 DIAGNOSIS — R73.03 PREDIABETES: ICD-10-CM

## 2025-03-12 DIAGNOSIS — Z00.00 ANNUAL PHYSICAL EXAM: Primary | ICD-10-CM

## 2025-03-12 LAB
ALBUMIN SERPL BCP-MCNC: 4.2 G/DL (ref 3.5–5.2)
ALP SERPL-CCNC: 33 U/L (ref 40–150)
ALT SERPL W/O P-5'-P-CCNC: 35 U/L (ref 10–44)
ANION GAP SERPL CALC-SCNC: 10 MMOL/L (ref 8–16)
AST SERPL-CCNC: 16 U/L (ref 10–40)
BILIRUB SERPL-MCNC: 0.5 MG/DL (ref 0.1–1)
BUN SERPL-MCNC: 12 MG/DL (ref 6–20)
CALCIUM SERPL-MCNC: 9.6 MG/DL (ref 8.7–10.5)
CHLORIDE SERPL-SCNC: 100 MMOL/L (ref 95–110)
CHOLEST SERPL-MCNC: 202 MG/DL (ref 120–199)
CHOLEST/HDLC SERPL: 6.3 {RATIO} (ref 2–5)
CO2 SERPL-SCNC: 22 MMOL/L (ref 23–29)
CREAT SERPL-MCNC: 1 MG/DL (ref 0.5–1.4)
EST. GFR  (NO RACE VARIABLE): >60 ML/MIN/1.73 M^2
ESTIMATED AVG GLUCOSE: 295 MG/DL (ref 68–131)
GLUCOSE SERPL-MCNC: 311 MG/DL (ref 70–110)
HBA1C MFR BLD: 11.9 % (ref 4–5.6)
HDLC SERPL-MCNC: 32 MG/DL (ref 40–75)
HDLC SERPL: 15.8 % (ref 20–50)
LDLC SERPL CALC-MCNC: 121.4 MG/DL (ref 63–159)
NONHDLC SERPL-MCNC: 170 MG/DL
POTASSIUM SERPL-SCNC: 4.1 MMOL/L (ref 3.5–5.1)
PROT SERPL-MCNC: 7.3 G/DL (ref 6–8.4)
SODIUM SERPL-SCNC: 132 MMOL/L (ref 136–145)
TRIGL SERPL-MCNC: 243 MG/DL (ref 30–150)

## 2025-03-12 PROCEDURE — 99396 PREV VISIT EST AGE 40-64: CPT | Mod: S$GLB,,, | Performed by: FAMILY MEDICINE

## 2025-03-12 PROCEDURE — 80061 LIPID PANEL: CPT | Performed by: FAMILY MEDICINE

## 2025-03-12 PROCEDURE — 1160F RVW MEDS BY RX/DR IN RCRD: CPT | Mod: CPTII,S$GLB,, | Performed by: FAMILY MEDICINE

## 2025-03-12 PROCEDURE — 99999 PR PBB SHADOW E&M-EST. PATIENT-LVL V: CPT | Mod: PBBFAC,,, | Performed by: FAMILY MEDICINE

## 2025-03-12 PROCEDURE — 3074F SYST BP LT 130 MM HG: CPT | Mod: CPTII,S$GLB,, | Performed by: FAMILY MEDICINE

## 2025-03-12 PROCEDURE — 85027 COMPLETE CBC AUTOMATED: CPT | Performed by: FAMILY MEDICINE

## 2025-03-12 PROCEDURE — 3078F DIAST BP <80 MM HG: CPT | Mod: CPTII,S$GLB,, | Performed by: FAMILY MEDICINE

## 2025-03-12 PROCEDURE — 3046F HEMOGLOBIN A1C LEVEL >9.0%: CPT | Mod: CPTII,S$GLB,, | Performed by: FAMILY MEDICINE

## 2025-03-12 PROCEDURE — 1159F MED LIST DOCD IN RCRD: CPT | Mod: CPTII,S$GLB,, | Performed by: FAMILY MEDICINE

## 2025-03-12 PROCEDURE — 4010F ACE/ARB THERAPY RXD/TAKEN: CPT | Mod: CPTII,S$GLB,, | Performed by: FAMILY MEDICINE

## 2025-03-12 PROCEDURE — 80053 COMPREHEN METABOLIC PANEL: CPT | Performed by: FAMILY MEDICINE

## 2025-03-12 PROCEDURE — 3008F BODY MASS INDEX DOCD: CPT | Mod: CPTII,S$GLB,, | Performed by: FAMILY MEDICINE

## 2025-03-12 PROCEDURE — 83036 HEMOGLOBIN GLYCOSYLATED A1C: CPT | Performed by: FAMILY MEDICINE

## 2025-03-12 PROCEDURE — 36415 COLL VENOUS BLD VENIPUNCTURE: CPT | Performed by: FAMILY MEDICINE

## 2025-03-12 RX ORDER — AMLODIPINE BESYLATE 10 MG/1
10 TABLET ORAL DAILY
Qty: 90 TABLET | Refills: 3 | Status: SHIPPED | OUTPATIENT
Start: 2025-03-12

## 2025-03-12 RX ORDER — VALSARTAN 320 MG/1
320 TABLET ORAL DAILY
Qty: 90 TABLET | Refills: 3 | Status: SHIPPED | OUTPATIENT
Start: 2025-03-12

## 2025-03-12 NOTE — PROGRESS NOTES
Subjective:       Patient ID: Sarkis Saez Jr. is a 50 y.o. male.    Chief Complaint: Annual Exam    Established patient for an annual wellness check/physical exam and also chronic disease management. Specific complaints - see dictation, M*model entries and please see ROS.  Past, Surgical, Family, Social Histories; Medications, Allergies reviewed and reconciled.  Health maintenance file reviewed and addressed items due. Recent applicable lab, imaging and cardiovascular results reviewed.  Problem list items reviewed and modified or added entries (in the overview section) may not be transcribed into this encounter note due to note writer format.      Difficulty with urination and frequency. Seeing urology.  Tells me that the urologist wanted diabetes ruled out.  Historically prediabetes without any significant change, however will check A1c with his laboratory.    Discussed ASCVD risk of 8.4%.  Offered calcium CT however patient wants to defer until this summer.        Review of Systems   Constitutional:  Negative for activity change and unexpected weight change.   HENT:  Negative for hearing loss, rhinorrhea and trouble swallowing.    Eyes:  Negative for discharge and visual disturbance.   Respiratory:  Negative for chest tightness and wheezing.    Cardiovascular:  Negative for chest pain and palpitations.   Gastrointestinal:  Negative for blood in stool, constipation, diarrhea and vomiting.   Endocrine: Positive for polydipsia and polyuria.   Genitourinary:  Positive for frequency and urgency. Negative for difficulty urinating and hematuria.   Musculoskeletal:  Negative for arthralgias, joint swelling and neck pain.   Neurological:  Negative for weakness and headaches.   Psychiatric/Behavioral:  Negative for confusion and dysphoric mood.        Objective:      Physical Exam  Vitals and nursing note reviewed.   Constitutional:       Appearance: He is well-developed. He is obese. He is not diaphoretic.   Eyes:       General: No scleral icterus.  Neck:      Thyroid: No thyromegaly.      Vascular: No JVD.      Trachea: No tracheal deviation.   Cardiovascular:      Rate and Rhythm: Normal rate and regular rhythm.      Heart sounds: Normal heart sounds. No murmur heard.     No friction rub. No gallop.   Pulmonary:      Effort: Pulmonary effort is normal. No respiratory distress.      Breath sounds: Normal breath sounds. No wheezing or rales.   Abdominal:      General: There is no distension.      Palpations: Abdomen is soft. There is no mass.      Tenderness: There is no abdominal tenderness. There is no guarding or rebound.   Musculoskeletal:      Cervical back: Normal range of motion and neck supple.   Lymphadenopathy:      Cervical: No cervical adenopathy.   Skin:     General: Skin is warm and dry.      Findings: No erythema or rash.   Neurological:      Mental Status: He is alert and oriented to person, place, and time.      Cranial Nerves: No cranial nerve deficit.      Motor: No tremor.      Coordination: Coordination normal.      Gait: Gait normal.   Psychiatric:         Behavior: Behavior normal.         Thought Content: Thought content normal.         Judgment: Judgment normal.         Assessment:       1. Annual physical exam    2. Hypertension, essential    3. Hyperlipidemia, unspecified hyperlipidemia type    4. Prostate cancer screening    5. BPH with urinary obstruction    6. Elevated blood sugar    7. Prediabetes    8. Encounter for screening for cardiovascular disorders        Plan:     Medication List with Changes/Refills   Current Medications    ASPIRIN (ECOTRIN) 81 MG EC TABLET    Take 81 mg by mouth once daily.    CLONAZEPAM (KLONOPIN) 0.5 MG TABLET    Take 1 tablet (0.5 mg total) by mouth 2 (two) times a day.    DULOXETINE (CYMBALTA) 30 MG CAPSULE    Take 30 mg by mouth once daily. Plus 60 mg for total daily 90 mg    DULOXETINE (CYMBALTA) 60 MG CAPSULE    Take 90 mg by mouth once daily.    GABAPENTIN  (NEURONTIN) 600 MG TABLET    Take 1 tablet (600 mg total) by mouth 3 (three) times daily.    MULTIVITAMIN CAPSULE    Take 1 capsule by mouth once daily.    MUPIROCIN (BACTROBAN) 2 % OINTMENT    Apply to affected area 3 times daily    TADALAFIL (CIALIS) 20 MG TAB    Take 1 tablet (20 mg total) by mouth daily as needed.   Changed and/or Refilled Medications    Modified Medication Previous Medication    AMLODIPINE (NORVASC) 10 MG TABLET amLODIPine (NORVASC) 10 MG tablet       Take 1 tablet (10 mg total) by mouth once daily.    Take 1 tablet (10 mg total) by mouth once daily.    VALSARTAN (DIOVAN) 320 MG TABLET valsartan (DIOVAN) 320 MG tablet       Take 1 tablet (320 mg total) by mouth once daily.    Take 1 tablet (320 mg total) by mouth once daily.     1. Annual physical exam  -     Hemoglobin A1C; Future; Expected date: 03/12/2025  -     Comprehensive Metabolic Panel; Future; Expected date: 03/12/2025  -     Lipid Panel; Future; Expected date: 03/12/2025  -     CBC Without Differential; Future; Expected date: 03/12/2025    2. Hypertension, essential  -     Comprehensive Metabolic Panel; Future; Expected date: 03/12/2025  -     CT Cardiac Scoring; Future; Expected date: 03/12/2025  -     amLODIPine (NORVASC) 10 MG tablet; Take 1 tablet (10 mg total) by mouth once daily.  Dispense: 90 tablet; Refill: 3  -     valsartan (DIOVAN) 320 MG tablet; Take 1 tablet (320 mg total) by mouth once daily.  Dispense: 90 tablet; Refill: 3    3. Hyperlipidemia, unspecified hyperlipidemia type  -     Lipid Panel; Future; Expected date: 03/12/2025  -     CT Cardiac Scoring; Future; Expected date: 03/12/2025    4. Prostate cancer screening    5. BPH with urinary obstruction    6. Elevated blood sugar  -     Hemoglobin A1C; Future; Expected date: 03/12/2025    7. Prediabetes  -     Hemoglobin A1C; Future; Expected date: 03/12/2025    8. Encounter for screening for cardiovascular disorders  -     CT Cardiac Scoring; Future; Expected date:  "03/12/2025      See meds, orders, follow up, routing and instructions sections of encounter and AVS. Discussed with patient and provided on AVS.    Discussed diet and exercise as therapeutic modalities for metabolic and other conditions. Provided patient information, which are included as links on the AVS for detailed information.    Lab Results   Component Value Date     (L) 03/12/2025    K 4.1 03/12/2025     03/12/2025    BUN 12 03/12/2025    CREATININE 1.0 03/12/2025     (H) 03/12/2025    HGBA1C 11.9 (H) 03/12/2025    AST 16 03/12/2025    ALT 35 03/12/2025    ALBUMIN 4.2 03/12/2025    PROT 7.3 03/12/2025    BILITOT 0.5 03/12/2025    CHOL 202 (H) 03/12/2025    HDL 32 (L) 03/12/2025    LDLCALC 121.4 03/12/2025    TRIG 243 (H) 03/12/2025    WBC 7.73 03/12/2025    HGB 14.5 03/12/2025    HCT 43.9 03/12/2025     03/12/2025    PSA 1.6 01/19/2024    PSADIAG 2.2 03/06/2025    TSH 1.056 09/28/2022         Diabetes Management Status    Statin: Not taking  ACE/ARB: Taking    Screening or Prevention Patient's value Goal Complete/Controlled?   HgA1C Testing and Control   Lab Results   Component Value Date    HGBA1C 11.9 (H) 03/12/2025      Annually/Less than 8% No   Lipid profile : 03/12/2025 Annually No   LDL control Lab Results   Component Value Date    LDLCALC 121.4 03/12/2025    Annually/Less than 100 mg/dl  No   Nephropathy screening No results found for: "LABMICR"  Lab Results   Component Value Date    PROTEINUA Negative 04/30/2019     No results found for: "UTPCR"   Annually No   Blood pressure BP Readings from Last 1 Encounters:   03/12/25 114/72    Less than 140/90 Yes   Dilated retinal exam Most Recent Eye Exam Date: Not Found Annually No   Foot exam   Most Recent Foot Exam Date: Not Found Annually No     "

## 2025-03-13 ENCOUNTER — PATIENT MESSAGE (OUTPATIENT)
Dept: INTERNAL MEDICINE | Facility: CLINIC | Age: 50
End: 2025-03-13
Payer: COMMERCIAL

## 2025-03-13 ENCOUNTER — RESULTS FOLLOW-UP (OUTPATIENT)
Dept: INTERNAL MEDICINE | Facility: CLINIC | Age: 50
End: 2025-03-13
Payer: COMMERCIAL

## 2025-03-13 LAB
ERYTHROCYTE [DISTWIDTH] IN BLOOD BY AUTOMATED COUNT: 13.4 % (ref 11.5–14.5)
HCT VFR BLD AUTO: 43.9 % (ref 40–54)
HGB BLD-MCNC: 14.5 G/DL (ref 14–18)
MCH RBC QN AUTO: 29.7 PG (ref 27–31)
MCHC RBC AUTO-ENTMCNC: 33 G/DL (ref 32–36)
MCV RBC AUTO: 90 FL (ref 82–98)
PLATELET # BLD AUTO: 382 K/UL (ref 150–450)
PMV BLD AUTO: 11 FL (ref 9.2–12.9)
RBC # BLD AUTO: 4.88 M/UL (ref 4.6–6.2)
WBC # BLD AUTO: 7.73 K/UL (ref 3.9–12.7)

## 2025-03-17 ENCOUNTER — TELEPHONE (OUTPATIENT)
Dept: INTERNAL MEDICINE | Facility: CLINIC | Age: 50
End: 2025-03-17
Payer: COMMERCIAL

## 2025-03-25 ENCOUNTER — OFFICE VISIT (OUTPATIENT)
Dept: INTERNAL MEDICINE | Facility: CLINIC | Age: 50
End: 2025-03-25
Attending: FAMILY MEDICINE
Payer: COMMERCIAL

## 2025-03-25 VITALS
WEIGHT: 315 LBS | HEART RATE: 93 BPM | DIASTOLIC BLOOD PRESSURE: 70 MMHG | SYSTOLIC BLOOD PRESSURE: 122 MMHG | BODY MASS INDEX: 42.66 KG/M2 | OXYGEN SATURATION: 92 % | HEIGHT: 72 IN

## 2025-03-25 DIAGNOSIS — I10 HYPERTENSION, ESSENTIAL: ICD-10-CM

## 2025-03-25 DIAGNOSIS — E11.9 TYPE 2 DIABETES MELLITUS WITHOUT COMPLICATION, WITHOUT LONG-TERM CURRENT USE OF INSULIN: Primary | ICD-10-CM

## 2025-03-25 DIAGNOSIS — E78.5 HYPERLIPIDEMIA, UNSPECIFIED HYPERLIPIDEMIA TYPE: ICD-10-CM

## 2025-03-25 PROBLEM — R73.03 PREDIABETES: Status: RESOLVED | Noted: 2025-03-12 | Resolved: 2025-03-25

## 2025-03-25 PROBLEM — E11.8 DM TYPE 2 CAUSING COMPLICATION: Status: RESOLVED | Noted: 2025-03-06 | Resolved: 2025-03-25

## 2025-03-25 PROCEDURE — 1159F MED LIST DOCD IN RCRD: CPT | Mod: CPTII,S$GLB,, | Performed by: FAMILY MEDICINE

## 2025-03-25 PROCEDURE — 99213 OFFICE O/P EST LOW 20 MIN: CPT | Mod: S$GLB,,, | Performed by: FAMILY MEDICINE

## 2025-03-25 PROCEDURE — 4010F ACE/ARB THERAPY RXD/TAKEN: CPT | Mod: CPTII,S$GLB,, | Performed by: FAMILY MEDICINE

## 2025-03-25 PROCEDURE — 99999 PR PBB SHADOW E&M-EST. PATIENT-LVL V: CPT | Mod: PBBFAC,,, | Performed by: FAMILY MEDICINE

## 2025-03-25 PROCEDURE — 3074F SYST BP LT 130 MM HG: CPT | Mod: CPTII,S$GLB,, | Performed by: FAMILY MEDICINE

## 2025-03-25 PROCEDURE — 82570 ASSAY OF URINE CREATININE: CPT | Performed by: FAMILY MEDICINE

## 2025-03-25 PROCEDURE — 3008F BODY MASS INDEX DOCD: CPT | Mod: CPTII,S$GLB,, | Performed by: FAMILY MEDICINE

## 2025-03-25 PROCEDURE — 1160F RVW MEDS BY RX/DR IN RCRD: CPT | Mod: CPTII,S$GLB,, | Performed by: FAMILY MEDICINE

## 2025-03-25 PROCEDURE — 3046F HEMOGLOBIN A1C LEVEL >9.0%: CPT | Mod: CPTII,S$GLB,, | Performed by: FAMILY MEDICINE

## 2025-03-25 PROCEDURE — 3078F DIAST BP <80 MM HG: CPT | Mod: CPTII,S$GLB,, | Performed by: FAMILY MEDICINE

## 2025-03-25 RX ORDER — PRAVASTATIN SODIUM 40 MG/1
40 TABLET ORAL DAILY
Qty: 90 TABLET | Refills: 3 | Status: SHIPPED | OUTPATIENT
Start: 2025-03-25

## 2025-03-25 RX ORDER — METFORMIN HYDROCHLORIDE 500 MG/1
500 TABLET, EXTENDED RELEASE ORAL 2 TIMES DAILY WITH MEALS
Qty: 180 TABLET | Refills: 1 | Status: SHIPPED | OUTPATIENT
Start: 2025-03-25

## 2025-03-25 NOTE — PROGRESS NOTES
Subjective:       Patient ID: Sarkis Saez Jr. is a 50 y.o. male.  History of Present Illness    CHIEF COMPLAINT:  Patient presents today for follow up of elevated blood sugar    DIABETES MANAGEMENT:  He has been following a diabetic diet since last visit and has eliminated fast food completely. He has switched from frequent soft drinks to sugar-free alternatives including Coke Zero, Diet Coke, green tea, mint tea, and black coffee with minimal cream and no sugar. He discontinued high-sugar [employer] beverages after discovering sugar content. When dining out, he typically consumes one glass of wine and one additional drink.    URINARY SYMPTOMS:  He reports improvement in urinary frequency that previously prompted urologist visit. Prior frequency was every 1-1.5 hours, now only urinating twice during school day. He denies current issues with urgency after fluid intake.    VISION:  He reports vision changes over the past year and a half since his last eye exam. He notes his prescription requires updating and has not had an eye exam in approximately 18 months.         Chief Complaint: Follow-up    Established patient follows up for management of chronic medical illnesses with complaints today. Please see dictation and ROS for interval problems, specific complaints and disease management discussion.    Past, Surgical, Family, Social, Histories; Medications, allergies reviewed and reconciled.  Health maintenance file reviewed and addressed items due. Recent applicable lab, imaging and cardiovascular results reviewed.  Problem list items reviewed and modified or added entries (in the overview section) may not be transcribed into this encounter note due to note writer format.              Review of Systems   Constitutional:  Negative for appetite change, chills, diaphoresis, fatigue and fever.   HENT:  Negative for congestion, postnasal drip, rhinorrhea, sore throat and trouble swallowing.    Eyes:  Negative for  visual disturbance.   Respiratory:  Negative for cough, choking, chest tightness, shortness of breath and wheezing.    Cardiovascular:  Negative for chest pain and leg swelling.   Gastrointestinal:  Negative for abdominal distention, abdominal pain, diarrhea, nausea and vomiting.   Endocrine: Positive for polydipsia and polyuria.   Genitourinary:  Negative for difficulty urinating and hematuria.   Musculoskeletal:  Negative for arthralgias and myalgias.   Skin:  Negative for rash.   Neurological:  Negative for weakness, light-headedness and headaches.   Psychiatric/Behavioral:  Negative for confusion and dysphoric mood.        Objective:      Physical Exam  Vitals and nursing note reviewed.   Constitutional:       General: He is not in acute distress.     Appearance: He is well-developed. He is obese.   Pulmonary:      Effort: Pulmonary effort is normal.   Musculoskeletal:      Cervical back: Neck supple.      Right lower leg: No edema.      Left lower leg: No edema.   Skin:     General: Skin is warm and dry.      Findings: No rash.   Neurological:      Mental Status: He is alert and oriented to person, place, and time.   Psychiatric:         Behavior: Behavior normal.         Thought Content: Thought content normal.         Judgment: Judgment normal.         Assessment:       1. Type 2 diabetes mellitus without complication, without long-term current use of insulin    2. Hyperlipidemia, unspecified hyperlipidemia type    3. Hypertension, essential        Plan:     Medication List with Changes/Refills   New Medications    METFORMIN (GLUCOPHAGE-XR) 500 MG ER 24HR TABLET    Take 1 tablet (500 mg total) by mouth 2 (two) times daily with meals.    PRAVASTATIN (PRAVACHOL) 40 MG TABLET    Take 1 tablet (40 mg total) by mouth once daily.   Current Medications    AMLODIPINE (NORVASC) 10 MG TABLET    Take 1 tablet (10 mg total) by mouth once daily.    ASPIRIN (ECOTRIN) 81 MG EC TABLET    Take 81 mg by mouth once daily.     CLONAZEPAM (KLONOPIN) 0.5 MG TABLET    Take 1 tablet (0.5 mg total) by mouth 2 (two) times a day.    DULOXETINE (CYMBALTA) 30 MG CAPSULE    Take 30 mg by mouth once daily. Plus 60 mg for total daily 90 mg    DULOXETINE (CYMBALTA) 60 MG CAPSULE    Take 90 mg by mouth once daily.    GABAPENTIN (NEURONTIN) 600 MG TABLET    Take 1 tablet (600 mg total) by mouth 3 (three) times daily.    MULTIVITAMIN CAPSULE    Take 1 capsule by mouth once daily.    MUPIROCIN (BACTROBAN) 2 % OINTMENT    Apply to affected area 3 times daily    TADALAFIL (CIALIS) 20 MG TAB    Take 1 tablet (20 mg total) by mouth daily as needed.    VALSARTAN (DIOVAN) 320 MG TABLET    Take 1 tablet (320 mg total) by mouth once daily.     1. Type 2 diabetes mellitus without complication, without long-term current use of insulin  -     metFORMIN (GLUCOPHAGE-XR) 500 MG ER 24hr tablet; Take 1 tablet (500 mg total) by mouth 2 (two) times daily with meals.  Dispense: 180 tablet; Refill: 1  -     pravastatin (PRAVACHOL) 40 MG tablet; Take 1 tablet (40 mg total) by mouth once daily.  Dispense: 90 tablet; Refill: 3  -     Ambulatory referral/consult to Optometry; Future; Expected date: 04/01/2025  -     Ambulatory referral/consult to Diabetes Education; Future; Expected date: 04/01/2025  -     Hemoglobin A1C; Future; Expected date: 09/21/2025  -     Basic Metabolic Panel; Future; Expected date: 09/21/2025  -     Microalbumin/Creatinine Ratio, Urine; Future; Expected date: 03/25/2025  -     Ambulatory referral/consult to Nutrition Services; Future; Expected date: 04/01/2025  -     Microalbumin/Creatinine Ratio, Urine; Future; Expected date: 03/25/2025    2. Hyperlipidemia, unspecified hyperlipidemia type  -     pravastatin (PRAVACHOL) 40 MG tablet; Take 1 tablet (40 mg total) by mouth once daily.  Dispense: 90 tablet; Refill: 3  -     ALT (SGPT); Future; Expected date: 09/21/2025  -     AST (SGOT); Future; Expected date: 09/21/2025  -     Lipid Panel; Future;  Expected date: 09/21/2025    3. Hypertension, essential      See meds, orders, follow up, routing and instructions sections of encounter and AVS. Discussed with patient and provided on AVS.      Assessment & Plan    E11.8 Type 2 diabetes mellitus with unspecified complications  E78.5 Hyperlipidemia, unspecified  H52.10 Myopia, unspecified eye      IMPRESSION:  - Diagnosed diabetes based on recent lab results showing elevated glucose levels, A1C 11.9.  - Initiated standard treatment protocol for newly diagnosed diabetes, including medication and lifestyle modifications.  - Considered future use of GLP-1 receptor agonists (e.g., Ozempic) if initial treatment with metformin is insufficient.  - Noted self-reported improvement in urination frequency, potentially due to recent dietary changes.    TYPE 2 DIABETES MELLITUS:  - Diagnosed the patient with diabetes based on elevated blood sugar.  - Noted changes in the patient's urination frequency.  - Explained the areas of concern with diabetes: eyes, heart, kidneys, and peripheral neurovascular system.  - Prescribed Metformin, long-acting formulation, middle dose, to be taken twice daily.  - Ordered repeat labs in 2 months to assess treatment efficacy.  - Referred the patient to a diabetic educator for diet counseling.  - Referred to diabetic educator for dietary guidance.    DIABETIC EYE COMPLICATIONS:  - Discussed the importance of regular eye exams for diabetic patients to detect and prevent retinal complications.  - Referred the patient to an optometrist for a diabetic eye exam.  - Noted that the patient reports a change in prescription and has not had eyes checked in 18 months.  - Explained that elevated glucose can affect vision by changing the shape of the eye's lens, which may impact prescription needs.  - Recommend an appointment with an optometrist for a comprehensive eye exam, including prescription update.    CARDIOVASCULAR RISK IN DIABETES:  - Informed the  patient about the increased risk of cardiovascular disease associated with diabetes.  - Explained the rationale for starting a statin medication regardless of current cholesterol levels due to increased cardiovascular risk associated with diabetes.    HYPERLIPIDEMIA:  - Prescribed Pravastatin for cholesterol management.  - Advised the patient to monitor for potential side effects of the medication.    HISTORY OF URINARY SYSTEM ISSUES:  - Noted the patient's history of excessive urination, which was previously a concern for the urologist.  - Observed improvement in the patient's urination frequency.           This note was generated with the assistance of ambient listening technology. Verbal consent was obtained by the patient and accompanying visitor(s) for the recording of patient appointment to facilitate this note. I attest to having reviewed and edited the generated note for accuracy, though some syntax or spelling errors may persist. Please contact the author of this note for any clarification.     Discussed diet and exercise as therapeutic modalities for metabolic and other conditions. Provided patient information, which are included as links on the AVS for detailed information.    Lab Results   Component Value Date     (L) 03/12/2025    K 4.1 03/12/2025     03/12/2025    BUN 12 03/12/2025    CREATININE 1.0 03/12/2025     (H) 03/12/2025    HGBA1C 11.9 (H) 03/12/2025    AST 16 03/12/2025    ALT 35 03/12/2025    ALBUMIN 4.2 03/12/2025    PROT 7.3 03/12/2025    BILITOT 0.5 03/12/2025    CHOL 202 (H) 03/12/2025    HDL 32 (L) 03/12/2025    LDLCALC 121.4 03/12/2025    TRIG 243 (H) 03/12/2025    WBC 7.73 03/12/2025    HGB 14.5 03/12/2025    HCT 43.9 03/12/2025     03/12/2025    PSA 1.6 01/19/2024    PSADIAG 2.2 03/06/2025    TSH 1.056 09/28/2022         Diabetes Management Status    Statin: Taking  ACE/ARB: Taking    Screening or Prevention Patient's value Goal Complete/Controlled?   HgA1C  "Testing and Control   Lab Results   Component Value Date    HGBA1C 11.9 (H) 03/12/2025      Annually/Less than 8% No   Lipid profile : 03/12/2025 Annually Yes   LDL control Lab Results   Component Value Date    LDLCALC 121.4 03/12/2025    Annually/Less than 100 mg/dl  No   Nephropathy screening No results found for: "LABMICR"  Lab Results   Component Value Date    PROTEINUA Negative 04/30/2019     No results found for: "UTPCR"   Annually No   Blood pressure BP Readings from Last 1 Encounters:   03/25/25 122/70    Less than 140/90 Yes   Dilated retinal exam Most Recent Eye Exam Date: Not Found Annually No   Foot exam   Most Recent Foot Exam Date: Not Found Annually No         "

## 2025-03-25 NOTE — PATIENT INSTRUCTIONS
See standing or future lab orders and please draw Labs ordered in this encounter -  2 months , thank you.

## 2025-03-26 LAB
ALBUMIN/CREAT UR: 20.5 UG/MG
CREAT UR-MCNC: 264 MG/DL (ref 23–375)
MICROALBUMIN UR-MCNC: 54 UG/ML (ref ?–5000)

## 2025-04-01 ENCOUNTER — PATIENT MESSAGE (OUTPATIENT)
Dept: INTERNAL MEDICINE | Facility: CLINIC | Age: 50
End: 2025-04-01
Payer: COMMERCIAL

## 2025-04-02 ENCOUNTER — CLINICAL SUPPORT (OUTPATIENT)
Dept: DIABETES | Facility: CLINIC | Age: 50
End: 2025-04-02
Payer: COMMERCIAL

## 2025-04-02 DIAGNOSIS — E11.9 TYPE 2 DIABETES MELLITUS WITHOUT COMPLICATION, WITHOUT LONG-TERM CURRENT USE OF INSULIN: ICD-10-CM

## 2025-04-02 DIAGNOSIS — E11.9 TYPE 2 DIABETES MELLITUS WITHOUT COMPLICATION, WITHOUT LONG-TERM CURRENT USE OF INSULIN: Primary | ICD-10-CM

## 2025-04-02 DIAGNOSIS — E11.9 TYPE 2 DIABETES MELLITUS WITHOUT COMPLICATION, UNSPECIFIED WHETHER LONG TERM INSULIN USE: ICD-10-CM

## 2025-04-02 PROCEDURE — G0108 DIAB MANAGE TRN  PER INDIV: HCPCS | Mod: S$GLB,,, | Performed by: DIETITIAN, REGISTERED

## 2025-04-02 PROCEDURE — 99999 PR PBB SHADOW E&M-EST. PATIENT-LVL I: CPT | Mod: PBBFAC,,, | Performed by: DIETITIAN, REGISTERED

## 2025-04-02 RX ORDER — LANCETS
1 EACH MISCELLANEOUS DAILY
Qty: 30 EACH | Refills: 11 | Status: SHIPPED | OUTPATIENT
Start: 2025-04-02

## 2025-04-02 RX ORDER — INSULIN PUMP SYRINGE, 3 ML
EACH MISCELLANEOUS
Qty: 1 EACH | Refills: 0 | Status: SHIPPED | OUTPATIENT
Start: 2025-04-02 | End: 2026-04-02

## 2025-04-02 NOTE — PROGRESS NOTES
Diabetes Care Specialist Progress Note  [facility] Sho Kim RD, [facility]  Date: 4/2/2025    Intake    Program Intake  Reason for Diabetes Program Visit:: Initial Diabetes Assessment  Current diabetes risk level:: high  In the last month, have you used the ER or been admitted to the hospital: No    Current Diabetes Treatment: Oral Medications  Oral Medication Type/Dose: Metformin    Continuous Glucose Monitoring  Patient has CGM: No    Lab Results   Component Value Date    HGBA1C 11.9 (H) 03/12/2025     Lifestyle Coping Support & Clinical    Lifestyle/Coping/Support  Does anyone in your family have diabetes or does anyone in your family support you in your diabetes care?: Yes  How do you deal with stress/distress?: Support from family/friends  Learning Barriers:: None  Culture or [employer]ism beliefs that may impact ability to access healthcare: No  Psychosocial/Coping Skills Assessment Completed: : Yes  Assessment indicates:: Adequate understanding  Area of need?: No    Problem Review  Active Comorbidities: Hypertension    Diabetes Self-Management Skills Assessment    Medication Skills Assessment  Patient is able to identify current diabetes medications, dosages, and appropriate timing of medications.: yes  Patient reports problems or concerns with current medication regimen.: no  Patient is  aware that some diabetes medications can cause low blood sugar?: No  Medication Skills Assessment Completed:: Yes  Assessment indicates:: Instruction Needed  Area of need?: Yes    Diabetes Disease Process/Treatment Options  Diabetes Type?: Type II  When were you diagnosed?: 2025  What are your goals for this education session?: To learn how to manage DM  Is patient aware of what causes diabetes?: No  Does patient understand the pathophysiology of diabetes?: No  Diabetes Disease Process/Treatment Options: Skills Assessment Completed: Yes  Assessment indicates:: Instruction Needed  Area of need?: Yes    Nutrition/Healthy Eating - 1-2  meals a day; has cut out FF since diagnosis; drinks water, coffee/tea w/ sweet n low, diet drinks, milk, likes juice but cut back  Patient can identify foods that impact blood sugar.: yes  Nutrition/Healthy Eating Skills Assessment Completed:: Yes  Assessment indicates:: Instruction Needed  Area of need?: Yes    Physical Activity/Exercise  Patient's daily activity level:: sedentary  Patient can identify forms of physical activity.: yes  Physical Activity/Exercise Skills Assessment Completed: : Yes  Assessment indicates:: Instruction Needed  Area of need?: Yes    Home Blood Glucose Monitoring  Patient states that blood sugar is checked at home daily.: no  Home Blood Glucose Monitoring Skills Assessment Completed: : Yes  Assessment indicates:: Instruction Needed  Area of need?: Yes    Acute Complications  Have you ever had hypoglycemia (low BG 70 or less)?: no  Have you ever had hyperglycemia (high  or more)?: no  Acute Complications Skills Assessment Completed: : Yes  Assessment indicates:: Instruction Needed  Area of need?: Yes    Chronic Complications  Reviewed health maintenance: yes  Have you completed your annual diabetes maintenance labwork? : yes  Do you examine your feet daily?: no  Has your doctor examined your feet?: no  Do you see a Dentist?: yes  Do you see an eye doctor?: yes  Chronic Complications Skills Assessment Completed: : Yes  Assessment indicates:: Instruction Needed  Area of need?: Yes      Assessment Summary and Plan    Based on today's diabetes care assessment, the following areas of need were identified:      Identified Areas of Need      Medication/Current Diabetes Treatment: Yes - reviewed MOA of Metformin and regimen   Lifestyle Coping/Support: No   Diabetes Disease Process/Treatment Options: Yes - reviewed diabetes disease process and treatment options   Nutrition/Healthy Eating: Yes - reviewed CHO counting, label reading and addt'l resources to assist w/ CHO counting; plate method  reviewed; alternatives to sugary beverages discussed   Physical Activity/Exercise: Yes - reviewed goals/benefits   Home Blood Glucose Monitoring: Yes - reviewed SMBG schedule and BG goals; Rx request sent to provider for meter/testing supplies   Acute Complications: Yes - reviewed s/s and treatment of hypo/hyperglycemia   Chronic Complications: Yes - reviewed standards of care       Today's interventions were provided through individual discussion, instruction, and written materials were provided.      Patient verbalized understanding of instruction and written materials.  Pt was able to return back demonstration of instructions today. Patient understood key points, needs reinforcement and further instruction.     Diabetes Self-Management Care Plan:    Today's Diabetes Self-Management Care Plan was developed with Sarkis's input. Sarkis has agreed to work toward the following goal(s) to improve his/her overall diabetes control.      Care Plan: Diabetes Management   Updates made since 4/2/2024 12:00 AM        Problem: Physical Activity and Exercise         Long-Range Goal: Patient agrees to increase physical activity to a goal of 150 minutes per week    Start Date: 4/2/2025   Expected End Date: 10/2/2025   Priority: High   Barriers: No Barriers Identified        Task: Discussed role of physical activity on reducing insulin resistance and improvement in overall glycemic control. Completed 4/2/2025       Follow Up Plan     Follow up in about 6 months (around 10/2/2025).    Today's care plan and follow up schedule was discussed with patient.  Sarkis verbalized understanding of the care plan, goals, and agrees to follow up plan.        The patient was encouraged to communicate with his/her health care provider/physician and care team regarding his/her condition(s) and treatment.  I provided the patient with my contact information today and encouraged to contact me via phone or [facility] Patient Portal as needed.      Length of Visit   Total Time: 60 Minutes

## 2025-04-02 NOTE — TELEPHONE ENCOUNTER
No care due was identified.  [facility] Embedded Care Due Messages. Reference number: [id number].   4/02/2025 8:33:33 AM CDT

## 2025-04-08 ENCOUNTER — PATIENT MESSAGE (OUTPATIENT)
Dept: DIABETES | Facility: CLINIC | Age: 50
End: 2025-04-08
Payer: COMMERCIAL

## 2025-04-30 NOTE — PROGRESS NOTES
Subjective:      Patient ID: Sarkis Saez Jr. is a 50 y.o. male.    Chief Complaint:  Diabetes and New Patient    History of Present Illness  Sarkis Saez Jr. is a 50 y.o. male with history significant for T2DM, HTN, HLD, fatty liver, LEE and is here for evaluation and management of T2DM. This is their first visit with me.      With regards to type 2 diabetes:    Diagnosed: 03/2025- polyuria and blurry vision (now improved)  FH: Father  Known complications:  DKA denies  RN : denies  Eye Exam: Most Recent Eye Exam Date: Not Found;  no laser surgery or DR  PN : + (L > R)  Foot exam: Most Recent Foot Exam Date: Not Found  Nephropathy denies  CAD denies  Denies history of pancreatitis & personal/family history of medullary thyroid cancer.     Current regimen:  Metformin  mg BID    Denies adverse effects    Compliant    Other medications tried:  Denies    Glucose Monitor: SMBG  1 times a day testing  Log reviewed: oral recall- fasting average 150s    Hypoglycemia:  Denies  Knows how to correct with 15 grams of carbs- juice, coke, or a peppermint.     Symptoms:  Polyuria denies  Polydipsia denies  Unintentional/unexplained weight changes denies. Intentionally loss >10 lbs with diet change.  Vision changes denies  Snoring: + CPAP use    Diet/Exercise:  Eats 2 meals a day. More lean meats and leafy greens  Snacks : occasionally popcorn, walnuts, cashews  Drinks : iced tea (sweet n low), water, coke zero  Exercise : trying to walk more, works as a teacher  Recent illness, injury, steroids: denies    Diabetes education: 04/02/2025 with AUGUSTUS Kim RD    Diabetes Management Status    Hemoglobin A1C   Date Value Ref Range Status   03/12/2025 11.9 (H) 4.0 - 5.6 % Final     Comment:     ADA Screening Guidelines:  5.7-6.4%  Consistent with prediabetes  >or=6.5%  Consistent with diabetes    High levels of fetal hemoglobin interfere with the HbA1C  assay. Heterozygous hemoglobin variants (HbS, HgC, etc)do  not  significantly interfere with this assay.   However, presence of multiple variants may affect accuracy.     01/19/2024 6.0 (H) 4.0 - 5.6 % Final     Comment:     ADA Screening Guidelines:  5.7-6.4%  Consistent with prediabetes  >or=6.5%  Consistent with diabetes    High levels of fetal hemoglobin interfere with the HbA1C  assay. Heterozygous hemoglobin variants (HbS, HgC, etc)do  not significantly interfere with this assay.   However, presence of multiple variants may affect accuracy.     07/27/2021 5.2 4.0 - 5.6 % Final     Comment:     ADA Screening Guidelines:  5.7-6.4%  Consistent with prediabetes  >or=6.5%  Consistent with diabetes    High levels of fetal hemoglobin interfere with the HbA1C  assay. Heterozygous hemoglobin variants (HbS, HgC, etc)do  not significantly interfere with this assay.   However, presence of multiple variants may affect accuracy.       Hemoglobin A1c   Date Value Ref Range Status   05/19/2025 7.0 (H) 4.0 - 5.6 % Final     Comment:     ADA Screening Guidelines:  5.7-6.4%  Consistent with prediabetes  >=6.5%  Consistent with diabetes    High levels of fetal hemoglobin interfere with the HbA1C  assay. Heterozygous hemoglobin variants (HbS, HgC, etc)do  not significantly interfere with this assay.   However, presence of multiple variants may affect accuracy.       Statin: Taking Pravastatin 40 mg  ACE/ARB: Taking Valsartan 320 mg   Screening or Prevention Patient's value Goal Complete/Controlled?   HgA1C Testing and Control   Lab Results   Component Value Date    HGBA1C 7.0 (H) 05/19/2025      Annually/Less than 8% No   Lipid profile : 05/19/2025 Annually Yes   LDL control Lab Results   Component Value Date    LDLCALC 75.0 05/19/2025    Annually/Less than 100 mg/dl  No   Nephropathy screening Lab Results   Component Value Date    LABMICR 54.0 03/25/2025     Lab Results   Component Value Date    PROTEINUA Negative 04/30/2019    Annually Yes   Blood pressure BP Readings from Last 1 Encounters:    05/20/25 130/80    Less than 140/90 Yes   Dilated retinal exam Most Recent Eye Exam Date: Not Found Annually Yes   Foot exam   Most Recent Foot Exam Date: Not Found Annually No     FIB-4 Calculation: 0.37 at 3/12/2025  5:15 PM  Calculated from:  SGOT/AST: 16 U/L at 3/12/2025  5:15 PM  SGPT/ALT: 35 U/L at 3/12/2025  5:15 PM  Platelets: 382 K/uL at 3/12/2025  5:15 PM  Age: 50 years     FIB-4 below 1.30 is considered as low-risk for advanced fibrosis  FIB-4 over 2.67 is considered as high-risk for advanced fibrosis  FIB-4 values between 1.30 and 2.67 are considered as intermediate-risk of advanced fibrosis for ages 36-64.     For ages > 64 the cut-off for low-risk goes to < 2.  This is a screening tool and clinical judgement should be used in the interpretation of these results.    Kidney Failure Risk Equation (Tangri)    Kidney Failure Risk at 2 years: Unavailable    Kidney Failure Risk at 5 years: Unavailable    Lab Results   Component Value Date    MICALBCREAT 20.5 03/25/2025    CREATININE 1.0 03/12/2025         Review of Systems  As above    Objective:   Physical Exam  Constitutional:       Appearance: Normal appearance. He is obese.   Cardiovascular:      Rate and Rhythm: Normal rate.      Comments: No edema  Pulmonary:      Effort: Pulmonary effort is normal.   Neurological:      Mental Status: He is alert.   Psychiatric:         Mood and Affect: Mood normal.         Behavior: Behavior normal.         Protective Sensation (w/ 10 gram monofilament):  Right: Intact  Left: Intact    Visual Inspection:  Normal -  Bilateral    Pedal Pulses:   Right: Present  Left: Present    Posterior Tibialis Pulses:   Right:Present  Left: Present    Visit Vitals  /80 (BP Location: Right arm, Patient Position: Sitting)   Pulse 83   Ht 6' (1.829 m)   Wt (!) 153.7 kg (338 lb 11.8 oz)   SpO2 97%   BMI 45.94 kg/m²       Body mass index is 45.94 kg/m².    Lab Review:   Lab Results   Component Value Date    HGBA1C 7.0 (H)  "05/19/2025    HGBA1C 11.9 (H) 03/12/2025    HGBA1C 6.0 (H) 01/19/2024       Lab Results   Component Value Date    CHOL 127 05/19/2025    HDL 32 (L) 05/19/2025    LDLCALC 75.0 05/19/2025    TRIG 100 05/19/2025    CHOLHDL 25.2 05/19/2025     Lab Results   Component Value Date     (L) 03/12/2025    K 4.1 03/12/2025     03/12/2025    CO2 22 (L) 03/12/2025     (H) 03/12/2025    BUN 12 03/12/2025    CREATININE 1.0 03/12/2025    CALCIUM 9.6 03/12/2025    PROT 7.3 03/12/2025    ALBUMIN 4.2 03/12/2025    BILITOT 0.5 03/12/2025    ALKPHOS 33 (L) 03/12/2025    AST 16 03/12/2025    ALT 35 03/12/2025    ANIONGAP 10 03/12/2025    ESTGFRAFRICA >60.0 07/27/2021    EGFRNONAA >60.0 07/27/2021    TSH 1.056 09/28/2022     No results found for: "[id number]"  Assessment and Plan     1. Type 2 diabetes mellitus without complication, without long-term current use of insulin        2. DM type 2 causing complication  Ambulatory referral/consult to Endocrinology    Comprehensive Metabolic Panel    Hemoglobin A1C    tirzepatide (MOUNJARO) 2.5 mg/0.5 mL PnIj      3. Class 3 severe obesity due to excess calories with serious comorbidity and body mass index (BMI) of 45.0 to 49.9 in adult        4. Hypertension, essential        5. Hyperlipidemia, unspecified hyperlipidemia type        6. Obstructive sleep apnea        7. Fatty liver            Type 2 diabetes mellitus without complication, without long-term current use of insulin   - Labs : CMP and A1C prior to next visit   - A1c goal <7.0%   - Last A1C 7.0% from 11.9% in 03/2025   - Reviewed logs/CGM: no logs to review   - A1C has greatly improved since dietary changes   - Encouraged patient to continue lifestyle changes and incorporate more exercise into daily routine   - Recommend starting a GLP1 agonist for continued diabetes control, weight loss, and cardiac health optimization   - Discussed that if A1C improves on GLP1 agonist, he may trial off of " Metformin    Medication:    - Continue Metformin 500 mg BID   - Start Mounjaro 2.5 mg weekly     - Reviewed goals of therapy are to get the best control we can without hypoglycemia.   - Advised frequent self blood glucose monitoring.  Patient encouraged to document glucose results and bring them to every clinic visit.   - Hypoglycemia precautions discussed. Instructed on precautions before driving.     - Call for Bg repeatedly < 70 or > 180.    - Close adherence to lifestyle changes recommended.    - Periodic follow ups for eye evaluations, foot care and dental care suggested.      Class 3 severe obesity due to excess calories with serious comorbidity and body mass index (BMI) of 45.0 to 49.9 in adult   - Encouraged dietary and lifestyle modifications.   - Emphasized weight loss goals.   - Start GLP1 agonist    Hypertension, essential   - On ARB   - Controlled.   - Blood pressure goals discussed with patient.      Hyperlipidemia   - LDL goal <70   - Controlled   - On statin per ADA recommendations      Obstructive sleep apnea   - Encouraged consistent CPAP use   - May improve with GLP1 agonist    Fatty liver   - May improve with GLP1 agonist      Follow up in about 3 months (around 8/20/2025).    Visit today included increased complexity associated with the care of the problems addressed and managing the longitudinal care of the patient due to the serious and/or complex managed problems.    Leticia Fermin PA-C

## 2025-05-19 ENCOUNTER — RESULTS FOLLOW-UP (OUTPATIENT)
Dept: INTERNAL MEDICINE | Facility: CLINIC | Age: 50
End: 2025-05-19

## 2025-05-19 ENCOUNTER — LAB VISIT (OUTPATIENT)
Dept: LAB | Facility: HOSPITAL | Age: 50
End: 2025-05-19
Attending: FAMILY MEDICINE
Payer: COMMERCIAL

## 2025-05-19 DIAGNOSIS — E11.9 TYPE 2 DIABETES MELLITUS WITHOUT COMPLICATION, WITHOUT LONG-TERM CURRENT USE OF INSULIN: ICD-10-CM

## 2025-05-19 DIAGNOSIS — E78.5 HYPERLIPIDEMIA, UNSPECIFIED HYPERLIPIDEMIA TYPE: ICD-10-CM

## 2025-05-19 LAB
CHOLEST SERPL-MCNC: 127 MG/DL (ref 120–199)
CHOLEST/HDLC SERPL: 4 {RATIO} (ref 2–5)
EAG (OHS): 154 MG/DL (ref 68–131)
HBA1C MFR BLD: 7 % (ref 4–5.6)
HDLC SERPL-MCNC: 32 MG/DL (ref 40–75)
HDLC SERPL: 25.2 % (ref 20–50)
LDLC SERPL CALC-MCNC: 75 MG/DL (ref 63–159)
NONHDLC SERPL-MCNC: 95 MG/DL
TRIGL SERPL-MCNC: 100 MG/DL (ref 30–150)

## 2025-05-19 PROCEDURE — 36415 COLL VENOUS BLD VENIPUNCTURE: CPT

## 2025-05-19 PROCEDURE — 82465 ASSAY BLD/SERUM CHOLESTEROL: CPT

## 2025-05-19 PROCEDURE — 83036 HEMOGLOBIN GLYCOSYLATED A1C: CPT

## 2025-05-20 ENCOUNTER — OFFICE VISIT (OUTPATIENT)
Dept: ENDOCRINOLOGY | Facility: CLINIC | Age: 50
End: 2025-05-20
Payer: COMMERCIAL

## 2025-05-20 VITALS
HEIGHT: 72 IN | WEIGHT: 315 LBS | SYSTOLIC BLOOD PRESSURE: 130 MMHG | DIASTOLIC BLOOD PRESSURE: 80 MMHG | OXYGEN SATURATION: 97 % | HEART RATE: 83 BPM | BODY MASS INDEX: 42.66 KG/M2

## 2025-05-20 DIAGNOSIS — G47.33 OBSTRUCTIVE SLEEP APNEA SYNDROME: ICD-10-CM

## 2025-05-20 DIAGNOSIS — E11.9 TYPE 2 DIABETES MELLITUS WITHOUT COMPLICATION, WITHOUT LONG-TERM CURRENT USE OF INSULIN: Primary | ICD-10-CM

## 2025-05-20 DIAGNOSIS — E78.5 HYPERLIPIDEMIA, UNSPECIFIED HYPERLIPIDEMIA TYPE: ICD-10-CM

## 2025-05-20 DIAGNOSIS — K76.0 FATTY LIVER: ICD-10-CM

## 2025-05-20 DIAGNOSIS — I10 HYPERTENSION, ESSENTIAL: ICD-10-CM

## 2025-05-20 DIAGNOSIS — E66.01 CLASS 3 SEVERE OBESITY DUE TO EXCESS CALORIES WITH SERIOUS COMORBIDITY AND BODY MASS INDEX (BMI) OF 45.0 TO 49.9 IN ADULT: ICD-10-CM

## 2025-05-20 DIAGNOSIS — E66.813 CLASS 3 SEVERE OBESITY DUE TO EXCESS CALORIES WITH SERIOUS COMORBIDITY AND BODY MASS INDEX (BMI) OF 45.0 TO 49.9 IN ADULT: ICD-10-CM

## 2025-05-20 DIAGNOSIS — E11.8 DM TYPE 2 CAUSING COMPLICATION: ICD-10-CM

## 2025-05-20 PROCEDURE — 3061F NEG MICROALBUMINURIA REV: CPT | Mod: CPTII,S$GLB,,

## 2025-05-20 PROCEDURE — G2211 COMPLEX E/M VISIT ADD ON: HCPCS | Mod: S$GLB,,,

## 2025-05-20 PROCEDURE — 1159F MED LIST DOCD IN RCRD: CPT | Mod: CPTII,S$GLB,,

## 2025-05-20 PROCEDURE — 99999 PR PBB SHADOW E&M-EST. PATIENT-LVL IV: CPT | Mod: PBBFAC,,,

## 2025-05-20 PROCEDURE — 3008F BODY MASS INDEX DOCD: CPT | Mod: CPTII,S$GLB,,

## 2025-05-20 PROCEDURE — 4010F ACE/ARB THERAPY RXD/TAKEN: CPT | Mod: CPTII,S$GLB,,

## 2025-05-20 PROCEDURE — 3051F HG A1C>EQUAL 7.0%<8.0%: CPT | Mod: CPTII,S$GLB,,

## 2025-05-20 PROCEDURE — 3079F DIAST BP 80-89 MM HG: CPT | Mod: CPTII,S$GLB,,

## 2025-05-20 PROCEDURE — 3066F NEPHROPATHY DOC TX: CPT | Mod: CPTII,S$GLB,,

## 2025-05-20 PROCEDURE — 99214 OFFICE O/P EST MOD 30 MIN: CPT | Mod: S$GLB,,,

## 2025-05-20 PROCEDURE — 3075F SYST BP GE 130 - 139MM HG: CPT | Mod: CPTII,S$GLB,,

## 2025-05-20 RX ORDER — TIRZEPATIDE 2.5 MG/.5ML
2.5 INJECTION, SOLUTION SUBCUTANEOUS
Qty: 2 ML | Refills: 11 | Status: SHIPPED | OUTPATIENT
Start: 2025-05-20 | End: 2026-05-20

## 2025-05-20 NOTE — ASSESSMENT & PLAN NOTE
- Encouraged dietary and lifestyle modifications.   - Emphasized weight loss goals.   - Start GLP1 agonist

## 2025-05-20 NOTE — ASSESSMENT & PLAN NOTE
- Labs : CMP and A1C prior to next visit   - A1c goal <7.0%   - Last A1C 7.0% from 11.9% in 03/2025   - Reviewed logs/CGM: no logs to review   - A1C has greatly improved since dietary changes   - Encouraged patient to continue lifestyle changes and incorporate more exercise into daily routine   - Recommend starting a GLP1 agonist for continued diabetes control, weight loss, and cardiac health optimization   - Discussed that if A1C improves on GLP1 agonist, he may trial off of Metformin    Medication:    - Continue Metformin 500 mg BID   - Start Mounjaro 2.5 mg weekly     - Reviewed goals of therapy are to get the best control we can without hypoglycemia.   - Advised frequent self blood glucose monitoring.  Patient encouraged to document glucose results and bring them to every clinic visit.   - Hypoglycemia precautions discussed. Instructed on precautions before driving.     - Call for Bg repeatedly < 70 or > 180.    - Close adherence to lifestyle changes recommended.    - Periodic follow ups for eye evaluations, foot care and dental care suggested.

## 2025-05-20 NOTE — PATIENT INSTRUCTIONS
[employer]  Mounjaro® (tirzepatide)       Diabetes  Continue Metformin 500 mg twice daily  Start Mounjaro 2.5 mg weekly

## 2025-08-23 DIAGNOSIS — E11.9 TYPE 2 DIABETES MELLITUS WITHOUT COMPLICATION, WITHOUT LONG-TERM CURRENT USE OF INSULIN: ICD-10-CM

## 2025-08-25 ENCOUNTER — LAB VISIT (OUTPATIENT)
Dept: LAB | Facility: HOSPITAL | Age: 50
End: 2025-08-25
Payer: COMMERCIAL

## 2025-08-25 DIAGNOSIS — E11.8 DM TYPE 2 CAUSING COMPLICATION: ICD-10-CM

## 2025-08-25 LAB
ALBUMIN SERPL BCP-MCNC: 4.2 G/DL (ref 3.5–5.2)
ALP SERPL-CCNC: 17 UNIT/L (ref 40–150)
ALT SERPL W/O P-5'-P-CCNC: 27 UNIT/L (ref 0–55)
ANION GAP (OHS): 10 MMOL/L (ref 8–16)
AST SERPL-CCNC: 39 UNIT/L (ref 0–50)
BILIRUB SERPL-MCNC: 0.7 MG/DL (ref 0.1–1)
BUN SERPL-MCNC: 11 MG/DL (ref 6–20)
CALCIUM SERPL-MCNC: 9.7 MG/DL (ref 8.7–10.5)
CHLORIDE SERPL-SCNC: 107 MMOL/L (ref 95–110)
CO2 SERPL-SCNC: 18 MMOL/L (ref 23–29)
CREAT SERPL-MCNC: 0.8 MG/DL (ref 0.5–1.4)
EAG (OHS): 114 MG/DL (ref 68–131)
GFR SERPLBLD CREATININE-BSD FMLA CKD-EPI: >60 ML/MIN/1.73/M2
GLUCOSE SERPL-MCNC: 87 MG/DL (ref 70–110)
HBA1C MFR BLD: 5.6 % (ref 4–5.6)
POTASSIUM SERPL-SCNC: 4.5 MMOL/L (ref 3.5–5.1)
PROT SERPL-MCNC: 7.7 GM/DL (ref 6–8.4)
SODIUM SERPL-SCNC: 135 MMOL/L (ref 136–145)

## 2025-08-25 PROCEDURE — 80053 COMPREHEN METABOLIC PANEL: CPT

## 2025-08-25 PROCEDURE — 83036 HEMOGLOBIN GLYCOSYLATED A1C: CPT

## 2025-08-25 PROCEDURE — 36415 COLL VENOUS BLD VENIPUNCTURE: CPT

## 2025-08-25 RX ORDER — METFORMIN HYDROCHLORIDE 500 MG/1
500 TABLET, EXTENDED RELEASE ORAL 2 TIMES DAILY WITH MEALS
Qty: 180 TABLET | Refills: 0 | Status: SHIPPED | OUTPATIENT
Start: 2025-08-25 | End: 2025-08-29

## 2025-08-29 ENCOUNTER — OFFICE VISIT (OUTPATIENT)
Dept: ENDOCRINOLOGY | Facility: CLINIC | Age: 50
End: 2025-08-29
Payer: COMMERCIAL

## 2025-08-29 VITALS
SYSTOLIC BLOOD PRESSURE: 132 MMHG | WEIGHT: 315 LBS | HEART RATE: 74 BPM | BODY MASS INDEX: 42.66 KG/M2 | HEIGHT: 72 IN | OXYGEN SATURATION: 96 % | DIASTOLIC BLOOD PRESSURE: 88 MMHG

## 2025-08-29 DIAGNOSIS — E78.5 HYPERLIPIDEMIA, UNSPECIFIED HYPERLIPIDEMIA TYPE: ICD-10-CM

## 2025-08-29 DIAGNOSIS — E66.813 CLASS 3 SEVERE OBESITY DUE TO EXCESS CALORIES WITH SERIOUS COMORBIDITY AND BODY MASS INDEX (BMI) OF 40.0 TO 44.9 IN ADULT: ICD-10-CM

## 2025-08-29 DIAGNOSIS — E11.9 TYPE 2 DIABETES MELLITUS WITHOUT COMPLICATION, WITHOUT LONG-TERM CURRENT USE OF INSULIN: Primary | ICD-10-CM

## 2025-08-29 DIAGNOSIS — G47.33 OBSTRUCTIVE SLEEP APNEA SYNDROME: ICD-10-CM

## 2025-08-29 DIAGNOSIS — I10 HYPERTENSION, ESSENTIAL: ICD-10-CM

## 2025-08-29 DIAGNOSIS — E11.8 DM TYPE 2 CAUSING COMPLICATION: ICD-10-CM

## 2025-08-29 DIAGNOSIS — K76.0 FATTY LIVER: ICD-10-CM

## 2025-08-29 PROCEDURE — 99999 PR PBB SHADOW E&M-EST. PATIENT-LVL IV: CPT | Mod: PBBFAC,,,

## 2025-08-29 RX ORDER — TIRZEPATIDE 5 MG/.5ML
5 INJECTION, SOLUTION SUBCUTANEOUS
Qty: 2 ML | Refills: 5 | Status: SHIPPED | OUTPATIENT
Start: 2025-08-29